# Patient Record
Sex: FEMALE | Race: WHITE | NOT HISPANIC OR LATINO | Employment: OTHER | URBAN - METROPOLITAN AREA
[De-identification: names, ages, dates, MRNs, and addresses within clinical notes are randomized per-mention and may not be internally consistent; named-entity substitution may affect disease eponyms.]

---

## 2017-02-03 ENCOUNTER — APPOINTMENT (EMERGENCY)
Dept: RADIOLOGY | Facility: HOSPITAL | Age: 67
End: 2017-02-03
Payer: MEDICARE

## 2017-02-03 ENCOUNTER — HOSPITAL ENCOUNTER (EMERGENCY)
Facility: HOSPITAL | Age: 67
Discharge: HOME | End: 2017-02-03
Attending: EMERGENCY MEDICINE | Admitting: EMERGENCY MEDICINE
Payer: MEDICARE

## 2017-02-03 VITALS
RESPIRATION RATE: 16 BRPM | HEART RATE: 70 BPM | DIASTOLIC BLOOD PRESSURE: 73 MMHG | OXYGEN SATURATION: 99 % | WEIGHT: 170 LBS | SYSTOLIC BLOOD PRESSURE: 160 MMHG

## 2017-02-03 DIAGNOSIS — E16.2 HYPOGLYCEMIA: Primary | ICD-10-CM

## 2017-02-03 LAB
ALBUMIN SERPL BCP-MCNC: 3.4 G/DL (ref 3.5–5)
ALP SERPL-CCNC: 35 U/L (ref 46–116)
ALT SERPL W P-5'-P-CCNC: 24 U/L (ref 12–78)
ANION GAP SERPL CALCULATED.3IONS-SCNC: 10 MMOL/L (ref 4–13)
AST SERPL W P-5'-P-CCNC: 56 U/L (ref 5–45)
ATRIAL RATE: 66 BPM
BASOPHILS # BLD AUTO: 0.01 THOUSANDS/ΜL (ref 0–0.1)
BASOPHILS NFR BLD AUTO: 0 % (ref 0–1)
BILIRUB SERPL-MCNC: 0.62 MG/DL (ref 0.2–1)
BUN SERPL-MCNC: 19 MG/DL (ref 5–25)
CALCIUM SERPL-MCNC: 8.7 MG/DL (ref 8.3–10.1)
CHLORIDE SERPL-SCNC: 101 MMOL/L (ref 100–108)
CO2 SERPL-SCNC: 23 MMOL/L (ref 21–32)
CREAT SERPL-MCNC: 0.92 MG/DL (ref 0.6–1.3)
EOSINOPHIL # BLD AUTO: 0.12 THOUSAND/ΜL (ref 0–0.61)
EOSINOPHIL NFR BLD AUTO: 1 % (ref 0–6)
ERYTHROCYTE [DISTWIDTH] IN BLOOD BY AUTOMATED COUNT: 13.3 % (ref 11.6–15.1)
GFR SERPL CREATININE-BSD FRML MDRD: >60 ML/MIN/1.73SQ M
GLUCOSE SERPL-MCNC: 203 MG/DL (ref 65–140)
GLUCOSE SERPL-MCNC: 246 MG/DL (ref 65–140)
GLUCOSE SERPL-MCNC: 254 MG/DL (ref 65–140)
GLUCOSE SERPL-MCNC: 384 MG/DL (ref 65–140)
HCT VFR BLD AUTO: 36.8 % (ref 34.8–46.1)
HGB BLD-MCNC: 12.4 G/DL (ref 11.5–15.4)
LYMPHOCYTES # BLD AUTO: 1.08 THOUSANDS/ΜL (ref 0.6–4.47)
LYMPHOCYTES NFR BLD AUTO: 12 % (ref 14–44)
MCH RBC QN AUTO: 29.7 PG (ref 26.8–34.3)
MCHC RBC AUTO-ENTMCNC: 33.7 G/DL (ref 31.4–37.4)
MCV RBC AUTO: 88 FL (ref 82–98)
MONOCYTES # BLD AUTO: 0.8 THOUSAND/ΜL (ref 0.17–1.22)
MONOCYTES NFR BLD AUTO: 9 % (ref 4–12)
NEUTROPHILS # BLD AUTO: 6.89 THOUSANDS/ΜL (ref 1.85–7.62)
NEUTS SEG NFR BLD AUTO: 78 % (ref 43–75)
NRBC BLD AUTO-RTO: 0 /100 WBCS
P AXIS: 44 DEGREES
PLATELET # BLD AUTO: 258 THOUSANDS/UL (ref 149–390)
PMV BLD AUTO: 11.4 FL (ref 8.9–12.7)
POTASSIUM SERPL-SCNC: 4.5 MMOL/L (ref 3.5–5.3)
PR INTERVAL: 140 MS
PROT SERPL-MCNC: 7 G/DL (ref 6.4–8.2)
QRS AXIS: 38 DEGREES
QRSD INTERVAL: 94 MS
QT INTERVAL: 398 MS
QTC INTERVAL: 417 MS
RBC # BLD AUTO: 4.17 MILLION/UL (ref 3.81–5.12)
SODIUM SERPL-SCNC: 134 MMOL/L (ref 136–145)
SPECIMEN SOURCE: NORMAL
T WAVE AXIS: 27 DEGREES
TROPONIN I BLD-MCNC: 0 NG/ML (ref 0–0.08)
VENTRICULAR RATE: 66 BPM
WBC # BLD AUTO: 8.91 THOUSAND/UL (ref 4.31–10.16)

## 2017-02-03 PROCEDURE — 70450 CT HEAD/BRAIN W/O DYE: CPT

## 2017-02-03 PROCEDURE — 80053 COMPREHEN METABOLIC PANEL: CPT | Performed by: EMERGENCY MEDICINE

## 2017-02-03 PROCEDURE — 99285 EMERGENCY DEPT VISIT HI MDM: CPT

## 2017-02-03 PROCEDURE — 84484 ASSAY OF TROPONIN QUANT: CPT

## 2017-02-03 PROCEDURE — 82948 REAGENT STRIP/BLOOD GLUCOSE: CPT

## 2017-02-03 PROCEDURE — 36415 COLL VENOUS BLD VENIPUNCTURE: CPT | Performed by: EMERGENCY MEDICINE

## 2017-02-03 PROCEDURE — 85025 COMPLETE CBC W/AUTO DIFF WBC: CPT | Performed by: EMERGENCY MEDICINE

## 2017-02-03 PROCEDURE — 93005 ELECTROCARDIOGRAM TRACING: CPT | Performed by: EMERGENCY MEDICINE

## 2017-02-03 RX ORDER — DEXTROSE MONOHYDRATE 25 G/50ML
INJECTION, SOLUTION INTRAVENOUS
Status: DISCONTINUED
Start: 2017-02-03 | End: 2017-02-03 | Stop reason: HOSPADM

## 2017-02-03 RX ORDER — DEXTROSE MONOHYDRATE 25 G/50ML
25 INJECTION, SOLUTION INTRAVENOUS ONCE
Status: DISCONTINUED | OUTPATIENT
Start: 2017-02-03 | End: 2017-02-03 | Stop reason: HOSPADM

## 2017-05-05 ENCOUNTER — TRANSCRIBE ORDERS (OUTPATIENT)
Dept: LAB | Facility: CLINIC | Age: 67
End: 2017-05-05

## 2017-05-05 ENCOUNTER — APPOINTMENT (OUTPATIENT)
Dept: LAB | Facility: CLINIC | Age: 67
End: 2017-05-05
Payer: MEDICARE

## 2017-05-05 DIAGNOSIS — E03.9 UNSPECIFIED HYPOTHYROIDISM: ICD-10-CM

## 2017-05-05 DIAGNOSIS — E27.5 MEDULLOADRENAL HYPERFUNCTION (HCC): ICD-10-CM

## 2017-05-05 DIAGNOSIS — I10 UNSPECIFIED ESSENTIAL HYPERTENSION: ICD-10-CM

## 2017-05-05 DIAGNOSIS — E11.8 DIABETIC COMPLICATION (HCC): Primary | ICD-10-CM

## 2017-05-05 DIAGNOSIS — E11.8 DIABETIC COMPLICATION (HCC): ICD-10-CM

## 2017-05-05 LAB
ALBUMIN SERPL BCP-MCNC: 3.6 G/DL (ref 3.5–5)
ALP SERPL-CCNC: 39 U/L (ref 46–116)
ALT SERPL W P-5'-P-CCNC: 23 U/L (ref 12–78)
ANION GAP SERPL CALCULATED.3IONS-SCNC: 14 MMOL/L (ref 4–13)
AST SERPL W P-5'-P-CCNC: 18 U/L (ref 5–45)
BASOPHILS # BLD AUTO: 0.02 THOUSANDS/ΜL (ref 0–0.1)
BASOPHILS NFR BLD AUTO: 0 % (ref 0–1)
BILIRUB SERPL-MCNC: 0.6 MG/DL (ref 0.2–1)
BUN SERPL-MCNC: 24 MG/DL (ref 5–25)
CALCIUM SERPL-MCNC: 9.2 MG/DL (ref 8.3–10.1)
CHLORIDE SERPL-SCNC: 104 MMOL/L (ref 100–108)
CO2 SERPL-SCNC: 25 MMOL/L (ref 21–32)
CREAT SERPL-MCNC: 0.95 MG/DL (ref 0.6–1.3)
CREAT UR-MCNC: 223 MG/DL
EOSINOPHIL # BLD AUTO: 0.06 THOUSAND/ΜL (ref 0–0.61)
EOSINOPHIL NFR BLD AUTO: 1 % (ref 0–6)
ERYTHROCYTE [DISTWIDTH] IN BLOOD BY AUTOMATED COUNT: 13.4 % (ref 11.6–15.1)
EST. AVERAGE GLUCOSE BLD GHB EST-MCNC: 137 MG/DL
GFR SERPL CREATININE-BSD FRML MDRD: 58.9 ML/MIN/1.73SQ M
GLUCOSE P FAST SERPL-MCNC: 83 MG/DL (ref 65–99)
HBA1C MFR BLD: 6.4 % (ref 4.2–6.3)
HCT VFR BLD AUTO: 38.8 % (ref 34.8–46.1)
HGB BLD-MCNC: 12.9 G/DL (ref 11.5–15.4)
LYMPHOCYTES # BLD AUTO: 1.31 THOUSANDS/ΜL (ref 0.6–4.47)
LYMPHOCYTES NFR BLD AUTO: 23 % (ref 14–44)
MAGNESIUM SERPL-MCNC: 1.3 MG/DL (ref 1.6–2.6)
MCH RBC QN AUTO: 29.4 PG (ref 26.8–34.3)
MCHC RBC AUTO-ENTMCNC: 33.2 G/DL (ref 31.4–37.4)
MCV RBC AUTO: 88 FL (ref 82–98)
MICROALBUMIN UR-MCNC: 19.5 MG/L (ref 0–20)
MICROALBUMIN/CREAT 24H UR: 9 MG/G CREATININE (ref 0–30)
MONOCYTES # BLD AUTO: 0.58 THOUSAND/ΜL (ref 0.17–1.22)
MONOCYTES NFR BLD AUTO: 10 % (ref 4–12)
NEUTROPHILS # BLD AUTO: 3.86 THOUSANDS/ΜL (ref 1.85–7.62)
NEUTS SEG NFR BLD AUTO: 66 % (ref 43–75)
PHOSPHATE SERPL-MCNC: 4 MG/DL (ref 2.3–4.1)
PLATELET # BLD AUTO: 297 THOUSANDS/UL (ref 149–390)
PMV BLD AUTO: 11.3 FL (ref 8.9–12.7)
POTASSIUM SERPL-SCNC: 4.2 MMOL/L (ref 3.5–5.3)
PROT SERPL-MCNC: 6.9 G/DL (ref 6.4–8.2)
RBC # BLD AUTO: 4.39 MILLION/UL (ref 3.81–5.12)
SODIUM SERPL-SCNC: 143 MMOL/L (ref 136–145)
T4 FREE SERPL-MCNC: 1.76 NG/DL (ref 0.76–1.46)
TSH SERPL DL<=0.05 MIU/L-ACNC: 0.42 UIU/ML (ref 0.36–3.74)
WBC # BLD AUTO: 5.83 THOUSAND/UL (ref 4.31–10.16)

## 2017-05-05 PROCEDURE — 82043 UR ALBUMIN QUANTITATIVE: CPT | Performed by: SPECIALIST

## 2017-05-05 PROCEDURE — 83036 HEMOGLOBIN GLYCOSYLATED A1C: CPT

## 2017-05-05 PROCEDURE — 36415 COLL VENOUS BLD VENIPUNCTURE: CPT

## 2017-05-05 PROCEDURE — 84443 ASSAY THYROID STIM HORMONE: CPT

## 2017-05-05 PROCEDURE — 84100 ASSAY OF PHOSPHORUS: CPT

## 2017-05-05 PROCEDURE — 82570 ASSAY OF URINE CREATININE: CPT | Performed by: SPECIALIST

## 2017-05-05 PROCEDURE — 84439 ASSAY OF FREE THYROXINE: CPT

## 2017-05-05 PROCEDURE — 80053 COMPREHEN METABOLIC PANEL: CPT

## 2017-05-05 PROCEDURE — 85025 COMPLETE CBC W/AUTO DIFF WBC: CPT

## 2017-05-05 PROCEDURE — 83735 ASSAY OF MAGNESIUM: CPT

## 2017-05-05 PROCEDURE — 83835 ASSAY OF METANEPHRINES: CPT

## 2017-05-10 LAB
METANEPH FREE SERPL-MCNC: <10 PG/ML (ref 0–62)
NORMETANEPHRINE SERPL-MCNC: 57 PG/ML (ref 0–145)

## 2017-07-23 ENCOUNTER — APPOINTMENT (EMERGENCY)
Dept: CT IMAGING | Facility: HOSPITAL | Age: 67
End: 2017-07-23
Payer: MEDICARE

## 2017-07-23 ENCOUNTER — APPOINTMENT (EMERGENCY)
Dept: RADIOLOGY | Facility: HOSPITAL | Age: 67
End: 2017-07-23
Payer: MEDICARE

## 2017-07-23 ENCOUNTER — HOSPITAL ENCOUNTER (OUTPATIENT)
Facility: HOSPITAL | Age: 67
Setting detail: OBSERVATION
Discharge: HOME | End: 2017-07-24
Attending: EMERGENCY MEDICINE | Admitting: INTERNAL MEDICINE
Payer: MEDICARE

## 2017-07-23 DIAGNOSIS — R41.82 ALTERED MENTAL STATUS: Primary | ICD-10-CM

## 2017-07-23 DIAGNOSIS — Z86.73 HISTORY OF STROKE: ICD-10-CM

## 2017-07-23 DIAGNOSIS — R19.7 DIARRHEA: ICD-10-CM

## 2017-07-23 DIAGNOSIS — E16.2 HYPOGLYCEMIA: ICD-10-CM

## 2017-07-23 DIAGNOSIS — G40.209 COMPLEX PARTIAL SEIZURE WITH IMPAIRMENT OF CONSCIOUSNESS AT ONSET (HCC): ICD-10-CM

## 2017-07-23 DIAGNOSIS — E86.0 DEHYDRATION: ICD-10-CM

## 2017-07-23 PROBLEM — E03.9 HYPOTHYROIDISM: Status: ACTIVE | Noted: 2017-07-23

## 2017-07-23 PROBLEM — IMO0001 INSULIN DEPENDENT DIABETES MELLITUS: Status: ACTIVE | Noted: 2017-07-23

## 2017-07-23 PROBLEM — I10 HYPERTENSION: Status: ACTIVE | Noted: 2017-07-23

## 2017-07-23 LAB
ALBUMIN SERPL BCP-MCNC: 3.4 G/DL (ref 3.5–5)
ALP SERPL-CCNC: 45 U/L (ref 46–116)
ALT SERPL W P-5'-P-CCNC: 21 U/L (ref 12–78)
AMMONIA PLAS-SCNC: <10 UMOL/L (ref 11–35)
AMPHETAMINES SERPL QL SCN: NEGATIVE
ANION GAP SERPL CALCULATED.3IONS-SCNC: 14 MMOL/L (ref 4–13)
APAP SERPL-MCNC: 3.2 UG/ML (ref 10–30)
APTT PPP: 35 SECONDS (ref 23–35)
AST SERPL W P-5'-P-CCNC: 15 U/L (ref 5–45)
BARBITURATES UR QL: NEGATIVE
BASOPHILS # BLD AUTO: 0.02 THOUSANDS/ΜL (ref 0–0.1)
BASOPHILS NFR BLD AUTO: 0 % (ref 0–1)
BENZODIAZ UR QL: NEGATIVE
BILIRUB SERPL-MCNC: 0.2 MG/DL (ref 0.2–1)
BILIRUB UR QL STRIP: NEGATIVE
BUN SERPL-MCNC: 29 MG/DL (ref 5–25)
CALCIUM SERPL-MCNC: 8.9 MG/DL (ref 8.3–10.1)
CHLORIDE SERPL-SCNC: 105 MMOL/L (ref 100–108)
CLARITY UR: CLEAR
CO2 SERPL-SCNC: 23 MMOL/L (ref 21–32)
COCAINE UR QL: NEGATIVE
COLOR UR: YELLOW
CREAT SERPL-MCNC: 0.91 MG/DL (ref 0.6–1.3)
EOSINOPHIL # BLD AUTO: 0.05 THOUSAND/ΜL (ref 0–0.61)
EOSINOPHIL NFR BLD AUTO: 1 % (ref 0–6)
ERYTHROCYTE [DISTWIDTH] IN BLOOD BY AUTOMATED COUNT: 13.2 % (ref 11.6–15.1)
ETHANOL SERPL-MCNC: <3 MG/DL (ref 0–3)
GFR SERPL CREATININE-BSD FRML MDRD: >60 ML/MIN/1.73SQ M
GLUCOSE SERPL-MCNC: 148 MG/DL (ref 65–140)
GLUCOSE SERPL-MCNC: 159 MG/DL (ref 65–140)
GLUCOSE SERPL-MCNC: 181 MG/DL (ref 65–140)
GLUCOSE SERPL-MCNC: 194 MG/DL (ref 65–140)
GLUCOSE SERPL-MCNC: 195 MG/DL (ref 65–140)
GLUCOSE SERPL-MCNC: 205 MG/DL (ref 65–140)
GLUCOSE SERPL-MCNC: 74 MG/DL (ref 65–140)
GLUCOSE UR STRIP-MCNC: NEGATIVE MG/DL
HCT VFR BLD AUTO: 34.3 % (ref 34.8–46.1)
HGB BLD-MCNC: 11.5 G/DL (ref 11.5–15.4)
HGB UR QL STRIP.AUTO: NEGATIVE
INR PPP: 0.86 (ref 0.86–1.16)
KETONES UR STRIP-MCNC: ABNORMAL MG/DL
LACTATE SERPL-SCNC: 1.7 MMOL/L (ref 0.5–2)
LEUKOCYTE ESTERASE UR QL STRIP: NEGATIVE
LYMPHOCYTES # BLD AUTO: 1.17 THOUSANDS/ΜL (ref 0.6–4.47)
LYMPHOCYTES NFR BLD AUTO: 16 % (ref 14–44)
MCH RBC QN AUTO: 30.1 PG (ref 26.8–34.3)
MCHC RBC AUTO-ENTMCNC: 33.5 G/DL (ref 31.4–37.4)
MCV RBC AUTO: 90 FL (ref 82–98)
METHADONE UR QL: NEGATIVE
MONOCYTES # BLD AUTO: 0.72 THOUSAND/ΜL (ref 0.17–1.22)
MONOCYTES NFR BLD AUTO: 10 % (ref 4–12)
NEUTROPHILS # BLD AUTO: 5.61 THOUSANDS/ΜL (ref 1.85–7.62)
NEUTS SEG NFR BLD AUTO: 73 % (ref 43–75)
NITRITE UR QL STRIP: NEGATIVE
OPIATES UR QL SCN: NEGATIVE
PCP UR QL: NEGATIVE
PH UR STRIP.AUTO: 5 [PH] (ref 4.5–8)
PLATELET # BLD AUTO: 305 THOUSANDS/UL (ref 149–390)
PMV BLD AUTO: 10.7 FL (ref 8.9–12.7)
POTASSIUM SERPL-SCNC: 3.6 MMOL/L (ref 3.5–5.3)
PROT SERPL-MCNC: 6.2 G/DL (ref 6.4–8.2)
PROT UR STRIP-MCNC: NEGATIVE MG/DL
PROTHROMBIN TIME: 12 SECONDS (ref 12.1–14.4)
RBC # BLD AUTO: 3.82 MILLION/UL (ref 3.81–5.12)
SALICYLATES SERPL-MCNC: <3 MG/DL (ref 3–20)
SODIUM SERPL-SCNC: 142 MMOL/L (ref 136–145)
SP GR UR STRIP.AUTO: >=1.03 (ref 1–1.03)
THC UR QL: NEGATIVE
TROPONIN I SERPL-MCNC: <0.02 NG/ML
TSH SERPL DL<=0.05 MIU/L-ACNC: 0.74 UIU/ML (ref 0.36–3.74)
UROBILINOGEN UR QL STRIP.AUTO: 0.2 E.U./DL
WBC # BLD AUTO: 7.57 THOUSAND/UL (ref 4.31–10.16)

## 2017-07-23 PROCEDURE — 80329 ANALGESICS NON-OPIOID 1 OR 2: CPT | Performed by: EMERGENCY MEDICINE

## 2017-07-23 PROCEDURE — 83605 ASSAY OF LACTIC ACID: CPT | Performed by: EMERGENCY MEDICINE

## 2017-07-23 PROCEDURE — 82948 REAGENT STRIP/BLOOD GLUCOSE: CPT

## 2017-07-23 PROCEDURE — 85730 THROMBOPLASTIN TIME PARTIAL: CPT | Performed by: EMERGENCY MEDICINE

## 2017-07-23 PROCEDURE — 96374 THER/PROPH/DIAG INJ IV PUSH: CPT

## 2017-07-23 PROCEDURE — 85025 COMPLETE CBC W/AUTO DIFF WBC: CPT | Performed by: EMERGENCY MEDICINE

## 2017-07-23 PROCEDURE — 96361 HYDRATE IV INFUSION ADD-ON: CPT

## 2017-07-23 PROCEDURE — 87086 URINE CULTURE/COLONY COUNT: CPT | Performed by: EMERGENCY MEDICINE

## 2017-07-23 PROCEDURE — 84443 ASSAY THYROID STIM HORMONE: CPT | Performed by: EMERGENCY MEDICINE

## 2017-07-23 PROCEDURE — 36415 COLL VENOUS BLD VENIPUNCTURE: CPT | Performed by: EMERGENCY MEDICINE

## 2017-07-23 PROCEDURE — 93005 ELECTROCARDIOGRAM TRACING: CPT | Performed by: EMERGENCY MEDICINE

## 2017-07-23 PROCEDURE — 80320 DRUG SCREEN QUANTALCOHOLS: CPT | Performed by: EMERGENCY MEDICINE

## 2017-07-23 PROCEDURE — 99285 EMERGENCY DEPT VISIT HI MDM: CPT

## 2017-07-23 PROCEDURE — 87040 BLOOD CULTURE FOR BACTERIA: CPT | Performed by: EMERGENCY MEDICINE

## 2017-07-23 PROCEDURE — 71010 HB CHEST X-RAY 1 VIEW FRONTAL (PORTABLE): CPT

## 2017-07-23 PROCEDURE — 85610 PROTHROMBIN TIME: CPT | Performed by: EMERGENCY MEDICINE

## 2017-07-23 PROCEDURE — 80053 COMPREHEN METABOLIC PANEL: CPT | Performed by: EMERGENCY MEDICINE

## 2017-07-23 PROCEDURE — 82140 ASSAY OF AMMONIA: CPT | Performed by: EMERGENCY MEDICINE

## 2017-07-23 PROCEDURE — 84484 ASSAY OF TROPONIN QUANT: CPT | Performed by: EMERGENCY MEDICINE

## 2017-07-23 PROCEDURE — 70450 CT HEAD/BRAIN W/O DYE: CPT

## 2017-07-23 PROCEDURE — 81003 URINALYSIS AUTO W/O SCOPE: CPT | Performed by: EMERGENCY MEDICINE

## 2017-07-23 PROCEDURE — 80307 DRUG TEST PRSMV CHEM ANLYZR: CPT | Performed by: EMERGENCY MEDICINE

## 2017-07-23 RX ORDER — FENOFIBRATE 145 MG/1
145 TABLET, COATED ORAL DAILY
COMMUNITY

## 2017-07-23 RX ORDER — LEVETIRACETAM 250 MG/1
250 TABLET ORAL 2 TIMES DAILY
COMMUNITY
End: 2017-07-24 | Stop reason: HOSPADM

## 2017-07-23 RX ORDER — ATORVASTATIN CALCIUM 20 MG/1
20 TABLET, FILM COATED ORAL DAILY
Status: DISCONTINUED | OUTPATIENT
Start: 2017-07-23 | End: 2017-07-24 | Stop reason: HOSPADM

## 2017-07-23 RX ORDER — FENOFIBRATE 145 MG/1
145 TABLET, COATED ORAL DAILY
Status: DISCONTINUED | OUTPATIENT
Start: 2017-07-23 | End: 2017-07-24 | Stop reason: HOSPADM

## 2017-07-23 RX ORDER — INSULIN GLARGINE 100 [IU]/ML
15 INJECTION, SOLUTION SUBCUTANEOUS
Status: DISCONTINUED | OUTPATIENT
Start: 2017-07-23 | End: 2017-07-24 | Stop reason: HOSPADM

## 2017-07-23 RX ORDER — CHLORAL HYDRATE 500 MG
1000 CAPSULE ORAL DAILY
Status: DISCONTINUED | OUTPATIENT
Start: 2017-07-23 | End: 2017-07-24 | Stop reason: HOSPADM

## 2017-07-23 RX ORDER — ACETAMINOPHEN 325 MG/1
500 TABLET ORAL EVERY 6 HOURS PRN
Status: DISCONTINUED | OUTPATIENT
Start: 2017-07-23 | End: 2017-07-24 | Stop reason: HOSPADM

## 2017-07-23 RX ORDER — DEXTROSE AND SODIUM CHLORIDE 5; .45 G/100ML; G/100ML
125 INJECTION, SOLUTION INTRAVENOUS CONTINUOUS
Status: DISCONTINUED | OUTPATIENT
Start: 2017-07-23 | End: 2017-07-23

## 2017-07-23 RX ORDER — ACETAMINOPHEN 500 MG
500 TABLET ORAL EVERY 6 HOURS PRN
COMMUNITY
End: 2017-07-30

## 2017-07-23 RX ORDER — CLOPIDOGREL BISULFATE 75 MG/1
75 TABLET ORAL DAILY
Status: DISCONTINUED | OUTPATIENT
Start: 2017-07-23 | End: 2017-07-24 | Stop reason: HOSPADM

## 2017-07-23 RX ORDER — INSULIN GLARGINE 100 [IU]/ML
INJECTION, SOLUTION SUBCUTANEOUS
Status: ON HOLD | COMMUNITY
End: 2017-07-23

## 2017-07-23 RX ORDER — LISINOPRIL 30 MG/1
30 TABLET ORAL DAILY
COMMUNITY

## 2017-07-23 RX ORDER — LEVOTHYROXINE SODIUM 0.07 MG/1
75 TABLET ORAL
Status: DISCONTINUED | OUTPATIENT
Start: 2017-07-23 | End: 2017-07-24 | Stop reason: HOSPADM

## 2017-07-23 RX ORDER — LEVETIRACETAM 500 MG/1
250 TABLET ORAL 2 TIMES DAILY
Status: DISCONTINUED | OUTPATIENT
Start: 2017-07-23 | End: 2017-07-23

## 2017-07-23 RX ORDER — AMLODIPINE BESYLATE 10 MG/1
10 TABLET ORAL DAILY
Status: DISCONTINUED | OUTPATIENT
Start: 2017-07-23 | End: 2017-07-24 | Stop reason: HOSPADM

## 2017-07-23 RX ORDER — FLUOXETINE 20 MG/1
20 TABLET, FILM COATED ORAL
COMMUNITY

## 2017-07-23 RX ORDER — HYDROCHLOROTHIAZIDE 25 MG/1
25 TABLET ORAL DAILY
Status: DISCONTINUED | OUTPATIENT
Start: 2017-07-23 | End: 2017-07-24 | Stop reason: HOSPADM

## 2017-07-23 RX ORDER — SODIUM CHLORIDE 9 MG/ML
75 INJECTION, SOLUTION INTRAVENOUS CONTINUOUS
Status: DISCONTINUED | OUTPATIENT
Start: 2017-07-23 | End: 2017-07-24 | Stop reason: HOSPADM

## 2017-07-23 RX ORDER — DEXTROSE MONOHYDRATE 25 G/50ML
25 INJECTION, SOLUTION INTRAVENOUS ONCE
Status: COMPLETED | OUTPATIENT
Start: 2017-07-23 | End: 2017-07-23

## 2017-07-23 RX ORDER — LEVOTHYROXINE SODIUM 0.07 MG/1
75 TABLET ORAL
COMMUNITY

## 2017-07-23 RX ORDER — ATORVASTATIN CALCIUM 20 MG/1
40 TABLET, FILM COATED ORAL DAILY
COMMUNITY

## 2017-07-23 RX ORDER — HYDROCHLOROTHIAZIDE 25 MG/1
25 TABLET ORAL DAILY
COMMUNITY

## 2017-07-23 RX ORDER — AMLODIPINE BESYLATE 10 MG/1
10 TABLET ORAL DAILY
COMMUNITY

## 2017-07-23 RX ORDER — FLUOXETINE HYDROCHLORIDE 20 MG/1
20 CAPSULE ORAL DAILY
Status: DISCONTINUED | OUTPATIENT
Start: 2017-07-23 | End: 2017-07-24 | Stop reason: HOSPADM

## 2017-07-23 RX ORDER — LEVETIRACETAM 500 MG/1
500 TABLET ORAL 2 TIMES DAILY
Status: DISCONTINUED | OUTPATIENT
Start: 2017-07-23 | End: 2017-07-24 | Stop reason: HOSPADM

## 2017-07-23 RX ORDER — OMEGA-3 FATTY ACIDS CAP DELAYED RELEASE 1000 MG 1000 MG
1000 CAPSULE DELAYED RELEASE ORAL 2 TIMES DAILY
COMMUNITY
End: 2019-10-09 | Stop reason: HOSPADM

## 2017-07-23 RX ORDER — ONDANSETRON 2 MG/ML
4 INJECTION INTRAMUSCULAR; INTRAVENOUS EVERY 6 HOURS PRN
Status: DISCONTINUED | OUTPATIENT
Start: 2017-07-23 | End: 2017-07-24 | Stop reason: HOSPADM

## 2017-07-23 RX ORDER — CLOPIDOGREL BISULFATE 75 MG/1
75 TABLET ORAL DAILY
COMMUNITY
End: 2018-04-30 | Stop reason: HOSPADM

## 2017-07-23 RX ADMIN — SODIUM CHLORIDE 75 ML/HR: 0.9 INJECTION, SOLUTION INTRAVENOUS at 10:58

## 2017-07-23 RX ADMIN — Medication 1000 MG: at 10:52

## 2017-07-23 RX ADMIN — SODIUM CHLORIDE 500 ML: 0.9 INJECTION, SOLUTION INTRAVENOUS at 05:56

## 2017-07-23 RX ADMIN — LEVETIRACETAM 250 MG: 500 TABLET ORAL at 10:52

## 2017-07-23 RX ADMIN — ENOXAPARIN SODIUM 40 MG: 40 INJECTION SUBCUTANEOUS at 10:53

## 2017-07-23 RX ADMIN — AMLODIPINE BESYLATE 10 MG: 10 TABLET ORAL at 10:52

## 2017-07-23 RX ADMIN — INSULIN LISPRO 1 UNITS: 100 INJECTION, SOLUTION INTRAVENOUS; SUBCUTANEOUS at 17:36

## 2017-07-23 RX ADMIN — LEVOTHYROXINE SODIUM 75 MCG: 75 TABLET ORAL at 10:51

## 2017-07-23 RX ADMIN — FLUOXETINE 20 MG: 20 CAPSULE ORAL at 10:52

## 2017-07-23 RX ADMIN — DEXTROSE MONOHYDRATE 25 ML: 25 INJECTION, SOLUTION INTRAVENOUS at 04:28

## 2017-07-23 RX ADMIN — DEXTROSE AND SODIUM CHLORIDE 125 ML/HR: 5; .45 INJECTION, SOLUTION INTRAVENOUS at 04:36

## 2017-07-23 RX ADMIN — HYDROCHLOROTHIAZIDE 25 MG: 25 TABLET ORAL at 10:52

## 2017-07-23 RX ADMIN — INSULIN GLARGINE 15 UNITS: 100 INJECTION, SOLUTION SUBCUTANEOUS at 21:39

## 2017-07-23 RX ADMIN — LISINOPRIL 30 MG: 10 TABLET ORAL at 10:52

## 2017-07-23 RX ADMIN — FENOFIBRATE 145 MG: 145 TABLET, FILM COATED ORAL at 10:53

## 2017-07-23 RX ADMIN — CLOPIDOGREL BISULFATE 75 MG: 75 TABLET ORAL at 10:53

## 2017-07-23 RX ADMIN — LEVETIRACETAM 500 MG: 500 TABLET ORAL at 17:35

## 2017-07-23 RX ADMIN — ATORVASTATIN CALCIUM 20 MG: 20 TABLET, FILM COATED ORAL at 10:53

## 2017-07-24 VITALS
DIASTOLIC BLOOD PRESSURE: 64 MMHG | SYSTOLIC BLOOD PRESSURE: 136 MMHG | HEIGHT: 59 IN | OXYGEN SATURATION: 95 % | TEMPERATURE: 98.6 F | WEIGHT: 152.6 LBS | RESPIRATION RATE: 18 BRPM | HEART RATE: 51 BPM | BODY MASS INDEX: 30.76 KG/M2

## 2017-07-24 PROBLEM — G40.209 COMPLEX PARTIAL SEIZURE WITH IMPAIRMENT OF CONSCIOUSNESS AT ONSET (HCC): Status: RESOLVED | Noted: 2017-07-01 | Resolved: 2017-07-24

## 2017-07-24 PROBLEM — R19.7 DIARRHEA: Status: RESOLVED | Noted: 2017-07-23 | Resolved: 2017-07-24

## 2017-07-24 PROBLEM — G40.209 COMPLEX PARTIAL SEIZURE WITH IMPAIRMENT OF CONSCIOUSNESS AT ONSET (HCC): Status: ACTIVE | Noted: 2017-07-01

## 2017-07-24 PROBLEM — R41.82 MENTAL STATUS CHANGE: Status: RESOLVED | Noted: 2017-07-23 | Resolved: 2017-07-24

## 2017-07-24 LAB
BACTERIA UR CULT: NORMAL
EST. AVERAGE GLUCOSE BLD GHB EST-MCNC: 143 MG/DL
GLUCOSE SERPL-MCNC: 123 MG/DL (ref 65–140)
HBA1C MFR BLD: 6.6 % (ref 4.2–6.3)
TSH SERPL DL<=0.05 MIU/L-ACNC: 0.64 UIU/ML (ref 0.36–3.74)

## 2017-07-24 PROCEDURE — 84443 ASSAY THYROID STIM HORMONE: CPT | Performed by: INTERNAL MEDICINE

## 2017-07-24 PROCEDURE — 82948 REAGENT STRIP/BLOOD GLUCOSE: CPT

## 2017-07-24 PROCEDURE — G8978 MOBILITY CURRENT STATUS: HCPCS

## 2017-07-24 PROCEDURE — 97163 PT EVAL HIGH COMPLEX 45 MIN: CPT

## 2017-07-24 PROCEDURE — G8988 SELF CARE GOAL STATUS: HCPCS

## 2017-07-24 PROCEDURE — G8987 SELF CARE CURRENT STATUS: HCPCS

## 2017-07-24 PROCEDURE — 83036 HEMOGLOBIN GLYCOSYLATED A1C: CPT | Performed by: INTERNAL MEDICINE

## 2017-07-24 PROCEDURE — 97166 OT EVAL MOD COMPLEX 45 MIN: CPT

## 2017-07-24 PROCEDURE — G8979 MOBILITY GOAL STATUS: HCPCS

## 2017-07-24 PROCEDURE — 97116 GAIT TRAINING THERAPY: CPT

## 2017-07-24 RX ORDER — INSULIN GLARGINE 100 [IU]/ML
15 INJECTION, SOLUTION SUBCUTANEOUS
Qty: 10 ML | Refills: 0 | Status: ON HOLD | OUTPATIENT
Start: 2017-07-24 | End: 2017-07-31

## 2017-07-24 RX ORDER — LEVETIRACETAM 500 MG/1
500 TABLET ORAL 2 TIMES DAILY
Refills: 0
Start: 2017-07-24 | End: 2018-04-03

## 2017-07-24 RX ADMIN — ATORVASTATIN CALCIUM 20 MG: 20 TABLET, FILM COATED ORAL at 08:41

## 2017-07-24 RX ADMIN — ENOXAPARIN SODIUM 40 MG: 40 INJECTION SUBCUTANEOUS at 08:41

## 2017-07-24 RX ADMIN — LEVOTHYROXINE SODIUM 75 MCG: 75 TABLET ORAL at 06:37

## 2017-07-24 RX ADMIN — CLOPIDOGREL BISULFATE 75 MG: 75 TABLET ORAL at 08:41

## 2017-07-24 RX ADMIN — FENOFIBRATE 145 MG: 145 TABLET, FILM COATED ORAL at 08:41

## 2017-07-24 RX ADMIN — AMLODIPINE BESYLATE 10 MG: 10 TABLET ORAL at 08:41

## 2017-07-24 RX ADMIN — Medication 1000 MG: at 08:42

## 2017-07-24 RX ADMIN — LEVETIRACETAM 500 MG: 500 TABLET ORAL at 08:41

## 2017-07-24 RX ADMIN — HYDROCHLOROTHIAZIDE 25 MG: 25 TABLET ORAL at 08:41

## 2017-07-24 RX ADMIN — LISINOPRIL 30 MG: 10 TABLET ORAL at 08:41

## 2017-07-24 RX ADMIN — FLUOXETINE 20 MG: 20 CAPSULE ORAL at 08:42

## 2017-07-24 RX ADMIN — SODIUM CHLORIDE 75 ML/HR: 0.9 INJECTION, SOLUTION INTRAVENOUS at 01:01

## 2017-07-26 LAB
ATRIAL RATE: 75 BPM
P AXIS: 37 DEGREES
PR INTERVAL: 132 MS
QRS AXIS: 2 DEGREES
QRSD INTERVAL: 88 MS
QT INTERVAL: 388 MS
QTC INTERVAL: 433 MS
T WAVE AXIS: 27 DEGREES
VENTRICULAR RATE: 75 BPM

## 2017-07-28 LAB
BACTERIA BLD CULT: NORMAL
BACTERIA BLD CULT: NORMAL

## 2017-07-30 ENCOUNTER — HOSPITAL ENCOUNTER (OUTPATIENT)
Facility: HOSPITAL | Age: 67
Setting detail: OBSERVATION
Discharge: HOME HEALTH | End: 2017-07-31
Attending: EMERGENCY MEDICINE | Admitting: INTERNAL MEDICINE
Payer: MEDICARE

## 2017-07-30 DIAGNOSIS — IMO0001 INSULIN DEPENDENT DIABETES MELLITUS: ICD-10-CM

## 2017-07-30 DIAGNOSIS — E16.2 HYPOGLYCEMIA: Primary | ICD-10-CM

## 2017-07-30 PROBLEM — R56.9 SEIZURE (HCC): Status: ACTIVE | Noted: 2017-07-30

## 2017-07-30 LAB
ANION GAP SERPL CALCULATED.3IONS-SCNC: 9 MMOL/L (ref 4–13)
BASOPHILS # BLD AUTO: 0.01 THOUSANDS/ΜL (ref 0–0.1)
BASOPHILS NFR BLD AUTO: 0 % (ref 0–1)
BUN SERPL-MCNC: 25 MG/DL (ref 5–25)
CALCIUM SERPL-MCNC: 8.5 MG/DL (ref 8.3–10.1)
CHLORIDE SERPL-SCNC: 105 MMOL/L (ref 100–108)
CO2 SERPL-SCNC: 25 MMOL/L (ref 21–32)
CREAT SERPL-MCNC: 0.81 MG/DL (ref 0.6–1.3)
EOSINOPHIL # BLD AUTO: 0.09 THOUSAND/ΜL (ref 0–0.61)
EOSINOPHIL NFR BLD AUTO: 1 % (ref 0–6)
ERYTHROCYTE [DISTWIDTH] IN BLOOD BY AUTOMATED COUNT: 12.9 % (ref 11.6–15.1)
GFR SERPL CREATININE-BSD FRML MDRD: 76 ML/MIN/1.73SQ M
GLUCOSE SERPL-MCNC: 167 MG/DL (ref 65–140)
GLUCOSE SERPL-MCNC: 170 MG/DL (ref 65–140)
GLUCOSE SERPL-MCNC: 204 MG/DL (ref 65–140)
GLUCOSE SERPL-MCNC: 323 MG/DL (ref 65–140)
GLUCOSE SERPL-MCNC: 97 MG/DL (ref 65–140)
HCT VFR BLD AUTO: 35 % (ref 34.8–46.1)
HGB BLD-MCNC: 11.6 G/DL (ref 11.5–15.4)
LYMPHOCYTES # BLD AUTO: 1.28 THOUSANDS/ΜL (ref 0.6–4.47)
LYMPHOCYTES NFR BLD AUTO: 18 % (ref 14–44)
MCH RBC QN AUTO: 29.9 PG (ref 26.8–34.3)
MCHC RBC AUTO-ENTMCNC: 33.1 G/DL (ref 31.4–37.4)
MCV RBC AUTO: 90 FL (ref 82–98)
MONOCYTES # BLD AUTO: 0.77 THOUSAND/ΜL (ref 0.17–1.22)
MONOCYTES NFR BLD AUTO: 11 % (ref 4–12)
NEUTROPHILS # BLD AUTO: 4.87 THOUSANDS/ΜL (ref 1.85–7.62)
NEUTS SEG NFR BLD AUTO: 70 % (ref 43–75)
PLATELET # BLD AUTO: 244 THOUSANDS/UL (ref 149–390)
PMV BLD AUTO: 11.1 FL (ref 8.9–12.7)
POTASSIUM SERPL-SCNC: 3.9 MMOL/L (ref 3.5–5.3)
RBC # BLD AUTO: 3.88 MILLION/UL (ref 3.81–5.12)
SODIUM SERPL-SCNC: 139 MMOL/L (ref 136–145)
WBC # BLD AUTO: 7.02 THOUSAND/UL (ref 4.31–10.16)

## 2017-07-30 PROCEDURE — 99285 EMERGENCY DEPT VISIT HI MDM: CPT

## 2017-07-30 PROCEDURE — 82948 REAGENT STRIP/BLOOD GLUCOSE: CPT

## 2017-07-30 PROCEDURE — 36415 COLL VENOUS BLD VENIPUNCTURE: CPT | Performed by: EMERGENCY MEDICINE

## 2017-07-30 PROCEDURE — 93005 ELECTROCARDIOGRAM TRACING: CPT | Performed by: EMERGENCY MEDICINE

## 2017-07-30 PROCEDURE — 80048 BASIC METABOLIC PNL TOTAL CA: CPT | Performed by: EMERGENCY MEDICINE

## 2017-07-30 PROCEDURE — 85025 COMPLETE CBC W/AUTO DIFF WBC: CPT | Performed by: EMERGENCY MEDICINE

## 2017-07-30 RX ORDER — CLOPIDOGREL BISULFATE 75 MG/1
75 TABLET ORAL DAILY
Status: DISCONTINUED | OUTPATIENT
Start: 2017-07-30 | End: 2017-07-31 | Stop reason: HOSPADM

## 2017-07-30 RX ORDER — FENOFIBRATE 145 MG/1
145 TABLET, COATED ORAL DAILY
Status: DISCONTINUED | OUTPATIENT
Start: 2017-07-30 | End: 2017-07-31 | Stop reason: HOSPADM

## 2017-07-30 RX ORDER — LEVETIRACETAM 500 MG/1
500 TABLET ORAL 2 TIMES DAILY
Status: DISCONTINUED | OUTPATIENT
Start: 2017-07-30 | End: 2017-07-31 | Stop reason: HOSPADM

## 2017-07-30 RX ORDER — AMLODIPINE BESYLATE 10 MG/1
10 TABLET ORAL DAILY
Status: DISCONTINUED | OUTPATIENT
Start: 2017-07-30 | End: 2017-07-31 | Stop reason: HOSPADM

## 2017-07-30 RX ORDER — FLUOXETINE HYDROCHLORIDE 20 MG/1
20 CAPSULE ORAL DAILY
Status: DISCONTINUED | OUTPATIENT
Start: 2017-07-30 | End: 2017-07-31 | Stop reason: HOSPADM

## 2017-07-30 RX ORDER — ASPIRIN 81 MG/1
81 TABLET, CHEWABLE ORAL DAILY
COMMUNITY
End: 2018-04-30 | Stop reason: HOSPADM

## 2017-07-30 RX ORDER — CHLORAL HYDRATE 500 MG
1000 CAPSULE ORAL 2 TIMES DAILY
Status: DISCONTINUED | OUTPATIENT
Start: 2017-07-30 | End: 2017-07-31 | Stop reason: HOSPADM

## 2017-07-30 RX ORDER — ASPIRIN 81 MG/1
81 TABLET, CHEWABLE ORAL DAILY
Status: DISCONTINUED | OUTPATIENT
Start: 2017-07-30 | End: 2017-07-31 | Stop reason: HOSPADM

## 2017-07-30 RX ORDER — HYDROCHLOROTHIAZIDE 25 MG/1
25 TABLET ORAL DAILY
Status: DISCONTINUED | OUTPATIENT
Start: 2017-07-30 | End: 2017-07-31 | Stop reason: HOSPADM

## 2017-07-30 RX ORDER — INSULIN GLARGINE 100 [IU]/ML
15 INJECTION, SOLUTION SUBCUTANEOUS
Status: DISCONTINUED | OUTPATIENT
Start: 2017-07-30 | End: 2017-07-31

## 2017-07-30 RX ORDER — ATORVASTATIN CALCIUM 20 MG/1
20 TABLET, FILM COATED ORAL DAILY
Status: DISCONTINUED | OUTPATIENT
Start: 2017-07-30 | End: 2017-07-31 | Stop reason: HOSPADM

## 2017-07-30 RX ORDER — LEVOTHYROXINE SODIUM 0.07 MG/1
75 TABLET ORAL DAILY
Status: DISCONTINUED | OUTPATIENT
Start: 2017-07-30 | End: 2017-07-31 | Stop reason: HOSPADM

## 2017-07-30 RX ADMIN — Medication 1000 MG: at 11:50

## 2017-07-30 RX ADMIN — INSULIN LISPRO 3 UNITS: 100 INJECTION, SOLUTION INTRAVENOUS; SUBCUTANEOUS at 17:32

## 2017-07-30 RX ADMIN — INSULIN LISPRO 3 UNITS: 100 INJECTION, SOLUTION INTRAVENOUS; SUBCUTANEOUS at 21:42

## 2017-07-30 RX ADMIN — LISINOPRIL 30 MG: 10 TABLET ORAL at 11:50

## 2017-07-30 RX ADMIN — ASPIRIN 81 MG 81 MG: 81 TABLET ORAL at 11:49

## 2017-07-30 RX ADMIN — FLUOXETINE 20 MG: 20 CAPSULE ORAL at 11:50

## 2017-07-30 RX ADMIN — FENOFIBRATE 145 MG: 145 TABLET, FILM COATED ORAL at 11:49

## 2017-07-30 RX ADMIN — Medication 1000 MG: at 17:31

## 2017-07-30 RX ADMIN — LEVETIRACETAM 500 MG: 500 TABLET, FILM COATED ORAL at 17:31

## 2017-07-30 RX ADMIN — INSULIN GLARGINE 15 UNITS: 100 INJECTION, SOLUTION SUBCUTANEOUS at 21:42

## 2017-07-30 RX ADMIN — ATORVASTATIN CALCIUM 20 MG: 20 TABLET, FILM COATED ORAL at 11:50

## 2017-07-30 RX ADMIN — ENOXAPARIN SODIUM 40 MG: 40 INJECTION SUBCUTANEOUS at 11:50

## 2017-07-30 RX ADMIN — LEVOTHYROXINE SODIUM 75 MCG: 75 TABLET ORAL at 11:49

## 2017-07-30 RX ADMIN — LEVETIRACETAM 500 MG: 500 TABLET, FILM COATED ORAL at 11:50

## 2017-07-30 RX ADMIN — AMLODIPINE BESYLATE 10 MG: 10 TABLET ORAL at 11:50

## 2017-07-30 RX ADMIN — HYDROCHLOROTHIAZIDE 25 MG: 25 TABLET ORAL at 11:49

## 2017-07-30 RX ADMIN — INSULIN LISPRO 1 UNITS: 100 INJECTION, SOLUTION INTRAVENOUS; SUBCUTANEOUS at 17:33

## 2017-07-30 RX ADMIN — CLOPIDOGREL BISULFATE 75 MG: 75 TABLET ORAL at 11:49

## 2017-07-31 VITALS
SYSTOLIC BLOOD PRESSURE: 105 MMHG | OXYGEN SATURATION: 96 % | WEIGHT: 162.92 LBS | BODY MASS INDEX: 32.84 KG/M2 | HEART RATE: 67 BPM | TEMPERATURE: 98.5 F | HEIGHT: 59 IN | RESPIRATION RATE: 16 BRPM | DIASTOLIC BLOOD PRESSURE: 53 MMHG

## 2017-07-31 PROBLEM — E16.2 HYPOGLYCEMIA: Status: RESOLVED | Noted: 2017-07-30 | Resolved: 2017-07-31

## 2017-07-31 PROBLEM — R41.82 ALTERED MENTAL STATE: Status: RESOLVED | Noted: 2017-07-23 | Resolved: 2017-07-31

## 2017-07-31 LAB
ANION GAP SERPL CALCULATED.3IONS-SCNC: 9 MMOL/L (ref 4–13)
ATRIAL RATE: 55 BPM
BUN SERPL-MCNC: 19 MG/DL (ref 5–25)
CALCIUM SERPL-MCNC: 8.8 MG/DL (ref 8.3–10.1)
CHLORIDE SERPL-SCNC: 106 MMOL/L (ref 100–108)
CO2 SERPL-SCNC: 27 MMOL/L (ref 21–32)
CREAT SERPL-MCNC: 0.86 MG/DL (ref 0.6–1.3)
ERYTHROCYTE [DISTWIDTH] IN BLOOD BY AUTOMATED COUNT: 13 % (ref 11.6–15.1)
GFR SERPL CREATININE-BSD FRML MDRD: 71 ML/MIN/1.73SQ M
GLUCOSE P FAST SERPL-MCNC: 85 MG/DL (ref 65–99)
GLUCOSE SERPL-MCNC: 194 MG/DL (ref 65–140)
GLUCOSE SERPL-MCNC: 207 MG/DL (ref 65–140)
GLUCOSE SERPL-MCNC: 85 MG/DL (ref 65–140)
GLUCOSE SERPL-MCNC: 86 MG/DL (ref 65–140)
HCT VFR BLD AUTO: 34 % (ref 34.8–46.1)
HGB BLD-MCNC: 11.3 G/DL (ref 11.5–15.4)
MCH RBC QN AUTO: 29.9 PG (ref 26.8–34.3)
MCHC RBC AUTO-ENTMCNC: 33.2 G/DL (ref 31.4–37.4)
MCV RBC AUTO: 90 FL (ref 82–98)
P AXIS: 43 DEGREES
PLATELET # BLD AUTO: 273 THOUSANDS/UL (ref 149–390)
PMV BLD AUTO: 11 FL (ref 8.9–12.7)
POTASSIUM SERPL-SCNC: 3.5 MMOL/L (ref 3.5–5.3)
PR INTERVAL: 160 MS
QRS AXIS: 20 DEGREES
QRSD INTERVAL: 96 MS
QT INTERVAL: 462 MS
QTC INTERVAL: 441 MS
RBC # BLD AUTO: 3.78 MILLION/UL (ref 3.81–5.12)
SODIUM SERPL-SCNC: 142 MMOL/L (ref 136–145)
T WAVE AXIS: 36 DEGREES
VENTRICULAR RATE: 55 BPM
WBC # BLD AUTO: 6.31 THOUSAND/UL (ref 4.31–10.16)

## 2017-07-31 PROCEDURE — 82948 REAGENT STRIP/BLOOD GLUCOSE: CPT

## 2017-07-31 PROCEDURE — G8979 MOBILITY GOAL STATUS: HCPCS

## 2017-07-31 PROCEDURE — 97116 GAIT TRAINING THERAPY: CPT

## 2017-07-31 PROCEDURE — G8978 MOBILITY CURRENT STATUS: HCPCS

## 2017-07-31 PROCEDURE — 85027 COMPLETE CBC AUTOMATED: CPT | Performed by: PHYSICIAN ASSISTANT

## 2017-07-31 PROCEDURE — 97162 PT EVAL MOD COMPLEX 30 MIN: CPT

## 2017-07-31 PROCEDURE — 80048 BASIC METABOLIC PNL TOTAL CA: CPT | Performed by: PHYSICIAN ASSISTANT

## 2017-07-31 RX ORDER — INSULIN GLARGINE 100 [IU]/ML
7 INJECTION, SOLUTION SUBCUTANEOUS
Start: 2017-07-31 | End: 2020-09-23 | Stop reason: HOSPADM

## 2017-07-31 RX ORDER — INSULIN GLARGINE 100 [IU]/ML
7 INJECTION, SOLUTION SUBCUTANEOUS
Status: DISCONTINUED | OUTPATIENT
Start: 2017-07-31 | End: 2017-07-31 | Stop reason: HOSPADM

## 2017-07-31 RX ADMIN — FENOFIBRATE 145 MG: 145 TABLET, FILM COATED ORAL at 09:04

## 2017-07-31 RX ADMIN — ENOXAPARIN SODIUM 40 MG: 40 INJECTION SUBCUTANEOUS at 09:05

## 2017-07-31 RX ADMIN — HYDROCHLOROTHIAZIDE 25 MG: 25 TABLET ORAL at 09:04

## 2017-07-31 RX ADMIN — INSULIN LISPRO 3 UNITS: 100 INJECTION, SOLUTION INTRAVENOUS; SUBCUTANEOUS at 12:26

## 2017-07-31 RX ADMIN — Medication 1000 MG: at 09:05

## 2017-07-31 RX ADMIN — ASPIRIN 81 MG 81 MG: 81 TABLET ORAL at 09:04

## 2017-07-31 RX ADMIN — LEVETIRACETAM 500 MG: 500 TABLET, FILM COATED ORAL at 09:04

## 2017-07-31 RX ADMIN — CLOPIDOGREL BISULFATE 75 MG: 75 TABLET ORAL at 09:05

## 2017-07-31 RX ADMIN — ATORVASTATIN CALCIUM 20 MG: 20 TABLET, FILM COATED ORAL at 09:04

## 2017-07-31 RX ADMIN — AMLODIPINE BESYLATE 10 MG: 10 TABLET ORAL at 09:04

## 2017-07-31 RX ADMIN — LEVOTHYROXINE SODIUM 75 MCG: 75 TABLET ORAL at 08:08

## 2017-07-31 RX ADMIN — INSULIN LISPRO 1 UNITS: 100 INJECTION, SOLUTION INTRAVENOUS; SUBCUTANEOUS at 12:27

## 2017-07-31 RX ADMIN — FLUOXETINE 20 MG: 20 CAPSULE ORAL at 09:04

## 2017-07-31 RX ADMIN — LISINOPRIL 30 MG: 10 TABLET ORAL at 09:04

## 2018-01-15 ENCOUNTER — APPOINTMENT (OUTPATIENT)
Dept: LAB | Facility: CLINIC | Age: 68
End: 2018-01-15
Payer: MEDICARE

## 2018-01-15 ENCOUNTER — TRANSCRIBE ORDERS (OUTPATIENT)
Dept: LAB | Facility: CLINIC | Age: 68
End: 2018-01-15

## 2018-01-15 DIAGNOSIS — I51.9 MYXEDEMA HEART DISEASE: ICD-10-CM

## 2018-01-15 DIAGNOSIS — E03.9 MYXEDEMA HEART DISEASE: ICD-10-CM

## 2018-01-15 DIAGNOSIS — I10 ESSENTIAL HYPERTENSION, MALIGNANT: ICD-10-CM

## 2018-01-15 DIAGNOSIS — E27.5 MEDULLOADRENAL HYPERFUNCTION (HCC): ICD-10-CM

## 2018-01-15 DIAGNOSIS — E11.8 DIABETIC COMPLICATION (HCC): Primary | ICD-10-CM

## 2018-01-15 DIAGNOSIS — E11.8 DIABETIC COMPLICATION (HCC): ICD-10-CM

## 2018-01-15 LAB
ALBUMIN SERPL BCP-MCNC: 3.3 G/DL (ref 3.5–5)
ALP SERPL-CCNC: 47 U/L (ref 46–116)
ALT SERPL W P-5'-P-CCNC: 19 U/L (ref 12–78)
ANION GAP SERPL CALCULATED.3IONS-SCNC: 10 MMOL/L (ref 4–13)
AST SERPL W P-5'-P-CCNC: 13 U/L (ref 5–45)
BILIRUB SERPL-MCNC: 0.5 MG/DL (ref 0.2–1)
BUN SERPL-MCNC: 20 MG/DL (ref 5–25)
CALCIUM SERPL-MCNC: 8.6 MG/DL (ref 8.3–10.1)
CHLORIDE SERPL-SCNC: 103 MMOL/L (ref 100–108)
CO2 SERPL-SCNC: 26 MMOL/L (ref 21–32)
CREAT SERPL-MCNC: 0.97 MG/DL (ref 0.6–1.3)
CREAT UR-MCNC: 138 MG/DL
ERYTHROCYTE [DISTWIDTH] IN BLOOD BY AUTOMATED COUNT: 12.4 % (ref 11.6–15.1)
EST. AVERAGE GLUCOSE BLD GHB EST-MCNC: 117 MG/DL
GFR SERPL CREATININE-BSD FRML MDRD: 61 ML/MIN/1.73SQ M
GLUCOSE SERPL-MCNC: 113 MG/DL (ref 65–140)
HBA1C MFR BLD: 5.7 % (ref 4.2–6.3)
HCT VFR BLD AUTO: 37.8 % (ref 34.8–46.1)
HGB BLD-MCNC: 12 G/DL (ref 11.5–15.4)
MAGNESIUM SERPL-MCNC: 1.4 MG/DL (ref 1.6–2.6)
MCH RBC QN AUTO: 29.3 PG (ref 26.8–34.3)
MCHC RBC AUTO-ENTMCNC: 31.7 G/DL (ref 31.4–37.4)
MCV RBC AUTO: 92 FL (ref 82–98)
MICROALBUMIN UR-MCNC: 23.7 MG/L (ref 0–20)
MICROALBUMIN/CREAT 24H UR: 17 MG/G CREATININE (ref 0–30)
PHOSPHATE SERPL-MCNC: 4.3 MG/DL (ref 2.3–4.1)
PLATELET # BLD AUTO: 376 THOUSANDS/UL (ref 149–390)
PMV BLD AUTO: 10.5 FL (ref 8.9–12.7)
POTASSIUM SERPL-SCNC: 3.7 MMOL/L (ref 3.5–5.3)
PROT SERPL-MCNC: 6.5 G/DL (ref 6.4–8.2)
RBC # BLD AUTO: 4.1 MILLION/UL (ref 3.81–5.12)
SODIUM SERPL-SCNC: 139 MMOL/L (ref 136–145)
T4 FREE SERPL-MCNC: 1.71 NG/DL (ref 0.76–1.46)
TSH SERPL DL<=0.05 MIU/L-ACNC: 0.52 UIU/ML (ref 0.36–3.74)
WBC # BLD AUTO: 5.63 THOUSAND/UL (ref 4.31–10.16)

## 2018-01-15 PROCEDURE — 84100 ASSAY OF PHOSPHORUS: CPT

## 2018-01-15 PROCEDURE — 83835 ASSAY OF METANEPHRINES: CPT

## 2018-01-15 PROCEDURE — 82024 ASSAY OF ACTH: CPT

## 2018-01-15 PROCEDURE — 84443 ASSAY THYROID STIM HORMONE: CPT

## 2018-01-15 PROCEDURE — 84439 ASSAY OF FREE THYROXINE: CPT

## 2018-01-15 PROCEDURE — 82570 ASSAY OF URINE CREATININE: CPT | Performed by: SPECIALIST

## 2018-01-15 PROCEDURE — 82043 UR ALBUMIN QUANTITATIVE: CPT | Performed by: SPECIALIST

## 2018-01-15 PROCEDURE — 83735 ASSAY OF MAGNESIUM: CPT

## 2018-01-15 PROCEDURE — 83036 HEMOGLOBIN GLYCOSYLATED A1C: CPT

## 2018-01-15 PROCEDURE — 36415 COLL VENOUS BLD VENIPUNCTURE: CPT

## 2018-01-15 PROCEDURE — 80053 COMPREHEN METABOLIC PANEL: CPT

## 2018-01-15 PROCEDURE — 85027 COMPLETE CBC AUTOMATED: CPT

## 2018-01-16 LAB — ACTH PLAS-MCNC: 10.8 PG/ML (ref 7.2–63.3)

## 2018-01-18 LAB
METANEPH FREE SERPL-MCNC: 20 PG/ML (ref 0–62)
NORMETANEPHRINE SERPL-MCNC: 107 PG/ML (ref 0–145)

## 2018-04-02 ENCOUNTER — ANESTHESIA EVENT (OUTPATIENT)
Dept: PERIOP | Facility: HOSPITAL | Age: 68
DRG: 470 | End: 2018-04-02
Payer: MEDICARE

## 2018-04-02 RX ORDER — SODIUM CHLORIDE 9 MG/ML
125 INJECTION, SOLUTION INTRAVENOUS CONTINUOUS
Status: CANCELLED | OUTPATIENT
Start: 2018-04-27

## 2018-04-03 ENCOUNTER — APPOINTMENT (OUTPATIENT)
Dept: PREADMISSION TESTING | Facility: HOSPITAL | Age: 68
End: 2018-04-03
Payer: MEDICARE

## 2018-04-03 ENCOUNTER — TRANSCRIBE ORDERS (OUTPATIENT)
Dept: ADMINISTRATIVE | Facility: HOSPITAL | Age: 68
End: 2018-04-03

## 2018-04-03 ENCOUNTER — HOSPITAL ENCOUNTER (OUTPATIENT)
Dept: NON INVASIVE DIAGNOSTICS | Facility: HOSPITAL | Age: 68
Discharge: HOME | End: 2018-04-03
Attending: ORTHOPAEDIC SURGERY
Payer: MEDICARE

## 2018-04-03 ENCOUNTER — HOSPITAL ENCOUNTER (OUTPATIENT)
Dept: RADIOLOGY | Facility: HOSPITAL | Age: 68
Discharge: HOME | End: 2018-04-03
Attending: ORTHOPAEDIC SURGERY
Payer: MEDICARE

## 2018-04-03 ENCOUNTER — APPOINTMENT (OUTPATIENT)
Dept: LAB | Facility: HOSPITAL | Age: 68
End: 2018-04-03
Attending: ORTHOPAEDIC SURGERY
Payer: MEDICARE

## 2018-04-03 ENCOUNTER — APPOINTMENT (OUTPATIENT)
Dept: LAB | Facility: HOSPITAL | Age: 68
End: 2018-04-03
Payer: MEDICARE

## 2018-04-03 DIAGNOSIS — Z78.0 MENOPAUSE: ICD-10-CM

## 2018-04-03 DIAGNOSIS — E03.9 MYXEDEMA HEART DISEASE: ICD-10-CM

## 2018-04-03 DIAGNOSIS — M16.12 PRIMARY OSTEOARTHRITIS OF LEFT HIP: ICD-10-CM

## 2018-04-03 DIAGNOSIS — I51.9 MYXEDEMA HEART DISEASE: ICD-10-CM

## 2018-04-03 DIAGNOSIS — C7B.8 SECONDARY NEUROENDOCRINE TUMOR OF RESPIRATORY ORGANS (HCC): ICD-10-CM

## 2018-04-03 DIAGNOSIS — Z79.4 ENCOUNTER FOR LONG-TERM (CURRENT) USE OF INSULIN (HCC): ICD-10-CM

## 2018-04-03 DIAGNOSIS — I63.9 STROKE-IN-EVOLUTION SYNDROME (HCC): ICD-10-CM

## 2018-04-03 DIAGNOSIS — Z01.818 PREOP EXAMINATION: ICD-10-CM

## 2018-04-03 DIAGNOSIS — E78.5 HYPERLIPIDEMIA, UNSPECIFIED HYPERLIPIDEMIA TYPE: ICD-10-CM

## 2018-04-03 DIAGNOSIS — F34.1 DYSTHYMIC DISORDER: ICD-10-CM

## 2018-04-03 DIAGNOSIS — E11.00 UNCONTROLLED TYPE 2 DIABETES MELLITUS WITH HYPEROSMOLARITY WITHOUT COMA, UNSPECIFIED LONG TERM INSULIN USE STATUS: ICD-10-CM

## 2018-04-03 DIAGNOSIS — I10 ESSENTIAL HYPERTENSION, MALIGNANT: ICD-10-CM

## 2018-04-03 DIAGNOSIS — Z01.818 PREOP EXAMINATION: Primary | ICD-10-CM

## 2018-04-03 DIAGNOSIS — I63.9 STROKE-IN-EVOLUTION SYNDROME (HCC): Primary | ICD-10-CM

## 2018-04-03 LAB
ALBUMIN SERPL BCP-MCNC: 4 G/DL (ref 3.5–5)
ALP SERPL-CCNC: 53 U/L (ref 46–116)
ALT SERPL W P-5'-P-CCNC: 20 U/L (ref 12–78)
ANION GAP SERPL CALCULATED.3IONS-SCNC: 16 MMOL/L (ref 4–13)
AST SERPL W P-5'-P-CCNC: 13 U/L (ref 5–45)
ATRIAL RATE: 60 BPM
BACTERIA UR QL AUTO: ABNORMAL /HPF
BASOPHILS # BLD AUTO: 0.02 THOUSANDS/ΜL (ref 0–0.1)
BASOPHILS NFR BLD AUTO: 0 % (ref 0–1)
BILIRUB SERPL-MCNC: 0.57 MG/DL (ref 0.2–1)
BILIRUB UR QL STRIP: ABNORMAL
BILIRUB UR QL STRIP: ABNORMAL
BUN SERPL-MCNC: 37 MG/DL (ref 5–25)
CALCIUM SERPL-MCNC: 9.1 MG/DL (ref 8.3–10.1)
CHLORIDE SERPL-SCNC: 100 MMOL/L (ref 100–108)
CHOLEST SERPL-MCNC: 133 MG/DL (ref 50–200)
CLARITY UR: ABNORMAL
CLARITY UR: ABNORMAL
CO2 SERPL-SCNC: 24 MMOL/L (ref 21–32)
COLOR UR: YELLOW
COLOR UR: YELLOW
CREAT SERPL-MCNC: 0.96 MG/DL (ref 0.6–1.3)
EOSINOPHIL # BLD AUTO: 0.17 THOUSAND/ΜL (ref 0–0.61)
EOSINOPHIL NFR BLD AUTO: 2 % (ref 0–6)
ERYTHROCYTE [DISTWIDTH] IN BLOOD BY AUTOMATED COUNT: 13 % (ref 11.6–15.1)
GFR SERPL CREATININE-BSD FRML MDRD: 61 ML/MIN/1.73SQ M
GLUCOSE P FAST SERPL-MCNC: 163 MG/DL (ref 65–99)
GLUCOSE UR STRIP-MCNC: NEGATIVE MG/DL
GLUCOSE UR STRIP-MCNC: NEGATIVE MG/DL
HCT VFR BLD AUTO: 38.5 % (ref 34.8–46.1)
HDLC SERPL-MCNC: 32 MG/DL (ref 40–60)
HGB BLD-MCNC: 13.2 G/DL (ref 11.5–15.4)
HGB UR QL STRIP.AUTO: NEGATIVE
HGB UR QL STRIP.AUTO: NEGATIVE
KETONES UR STRIP-MCNC: ABNORMAL MG/DL
KETONES UR STRIP-MCNC: ABNORMAL MG/DL
LDLC SERPL CALC-MCNC: 68 MG/DL (ref 0–100)
LDLC SERPL DIRECT ASSAY-MCNC: 82 MG/DL (ref 0–100)
LEUKOCYTE ESTERASE UR QL STRIP: ABNORMAL
LEUKOCYTE ESTERASE UR QL STRIP: ABNORMAL
LYMPHOCYTES # BLD AUTO: 1.61 THOUSANDS/ΜL (ref 0.6–4.47)
LYMPHOCYTES NFR BLD AUTO: 22 % (ref 14–44)
MCH RBC QN AUTO: 31.4 PG (ref 26.8–34.3)
MCHC RBC AUTO-ENTMCNC: 34.3 G/DL (ref 31.4–37.4)
MCV RBC AUTO: 92 FL (ref 82–98)
MONOCYTES # BLD AUTO: 0.86 THOUSAND/ΜL (ref 0.17–1.22)
MONOCYTES NFR BLD AUTO: 12 % (ref 4–12)
NEUTROPHILS # BLD AUTO: 4.55 THOUSANDS/ΜL (ref 1.85–7.62)
NEUTS SEG NFR BLD AUTO: 64 % (ref 43–75)
NITRITE UR QL STRIP: NEGATIVE
NITRITE UR QL STRIP: NEGATIVE
NON-SQ EPI CELLS URNS QL MICRO: ABNORMAL /HPF
NRBC BLD AUTO-RTO: 0 /100 WBCS
P AXIS: -13 DEGREES
PH UR STRIP.AUTO: 5 [PH] (ref 4.5–8)
PH UR STRIP.AUTO: 5 [PH] (ref 4.5–8)
PLATELET # BLD AUTO: 340 THOUSANDS/UL (ref 149–390)
PMV BLD AUTO: 11.1 FL (ref 8.9–12.7)
POTASSIUM SERPL-SCNC: 4.2 MMOL/L (ref 3.5–5.3)
PR INTERVAL: 122 MS
PROT SERPL-MCNC: 7.5 G/DL (ref 6.4–8.2)
PROT UR STRIP-MCNC: NEGATIVE MG/DL
PROT UR STRIP-MCNC: NEGATIVE MG/DL
QRS AXIS: 6 DEGREES
QRSD INTERVAL: 88 MS
QT INTERVAL: 424 MS
QTC INTERVAL: 424 MS
RBC # BLD AUTO: 4.2 MILLION/UL (ref 3.81–5.12)
RBC #/AREA URNS AUTO: ABNORMAL /HPF
SODIUM SERPL-SCNC: 140 MMOL/L (ref 136–145)
SP GR UR STRIP.AUTO: >=1.03 (ref 1–1.03)
SP GR UR STRIP.AUTO: >=1.03 (ref 1–1.03)
T WAVE AXIS: 43 DEGREES
TRIGL SERPL-MCNC: 166 MG/DL
TSH SERPL DL<=0.05 MIU/L-ACNC: 0.74 UIU/ML (ref 0.36–3.74)
UROBILINOGEN UR QL STRIP.AUTO: 0.2 E.U./DL
UROBILINOGEN UR QL STRIP.AUTO: 0.2 E.U./DL
VENTRICULAR RATE: 60 BPM
WBC # BLD AUTO: 7.21 THOUSAND/UL (ref 4.31–10.16)
WBC #/AREA URNS AUTO: ABNORMAL /HPF

## 2018-04-03 PROCEDURE — 84443 ASSAY THYROID STIM HORMONE: CPT

## 2018-04-03 PROCEDURE — 85025 COMPLETE CBC W/AUTO DIFF WBC: CPT

## 2018-04-03 PROCEDURE — 71046 X-RAY EXAM CHEST 2 VIEWS: CPT

## 2018-04-03 PROCEDURE — 83721 ASSAY OF BLOOD LIPOPROTEIN: CPT

## 2018-04-03 PROCEDURE — 80053 COMPREHEN METABOLIC PANEL: CPT

## 2018-04-03 PROCEDURE — 81001 URINALYSIS AUTO W/SCOPE: CPT | Performed by: FAMILY MEDICINE

## 2018-04-03 PROCEDURE — 80061 LIPID PANEL: CPT

## 2018-04-03 PROCEDURE — 36415 COLL VENOUS BLD VENIPUNCTURE: CPT

## 2018-04-03 PROCEDURE — 93005 ELECTROCARDIOGRAM TRACING: CPT

## 2018-04-03 PROCEDURE — 93010 ELECTROCARDIOGRAM REPORT: CPT | Performed by: INTERNAL MEDICINE

## 2018-04-03 RX ORDER — FENOFIBRATE 130 MG/1
130 CAPSULE ORAL
COMMUNITY
End: 2018-04-24 | Stop reason: CLARIF

## 2018-04-03 RX ORDER — LIOTHYRONINE SODIUM 25 UG/1
25 TABLET ORAL DAILY
COMMUNITY
End: 2018-04-03

## 2018-04-03 RX ORDER — LEVETIRACETAM 250 MG/1
500 TABLET ORAL 2 TIMES DAILY
COMMUNITY

## 2018-04-03 NOTE — ANESTHESIA PREPROCEDURE EVALUATION
Review of Systems/Medical History  Patient summary reviewed  Chart reviewed  No history of anesthetic complications     Cardiovascular  Hyperlipidemia, Hypertension on > 1 medication,   Comment:  says her "heart stopped" during a 2014 operation at "[facility]"  I am only finding references to MCA stroke/hypoglycemia  Admission during that time period  She's most likely on Plavix for that Dx, but chart is a combo of poor historian and lack of preop documentation  ,  Pulmonary  Negative pulmonary ROS        GI/Hepatic  Negative GI/hepatic ROS               Endo/Other  Diabetes well controlled type 2 Insulin, History of thyroid disease , hypothyroidism,      GYN  Negative gynecology ROS          Hematology  Negative hematology ROS      Musculoskeletal    Arthritis     Neurology  Seizures well controlled,  CVA , no residual symptoms,   Comment: Basilar artery aneurysm Psychology   Anxiety, Depression , being treated for depression,              Physical Exam    Airway    Mallampati score: II  TM Distance: >3 FB  Neck ROM: full     Dental   No notable dental hx     Cardiovascular  Rhythm: regular, Rate: normal, Cardiovascular exam normal    Pulmonary  Pulmonary exam normal Breath sounds clear to auscultation,     Other Findings        Anesthesia Plan  ASA Score- 3     Anesthesia Type- spinal and regional with ASA Monitors  Additional Monitors:   Airway Plan:     Comment: Need medical evaluation from her primary, to confirm/deny her PMH issues  I did empirically ask them to stop clopidogrel one week before procedure        Plan Factors-Patient not instructed to abstain from smoking on day of procedure  Patient did not smoke on day of surgery  Induction- intravenous  Postoperative Plan-     Informed Consent- Anesthetic plan and risks discussed with patient and spouse

## 2018-04-03 NOTE — PRE-PROCEDURE INSTRUCTIONS
Pre-Surgery Instructions:   Medication Instructions    amLODIPine (NORVASC) 10 mg tablet Patient was instructed to contact Physician for medication instruction   aspirin 81 mg chewable tablet Patient was instructed to contact Physician for medication instruction   atorvastatin (LIPITOR) 20 mg tablet Patient was instructed to contact Physician for medication instruction   clopidogrel (PLAVIX) 75 mg tablet Patient was instructed to contact Physician for medication instruction   fenofibrate (TRICOR) 145 mg tablet Patient was instructed to contact Physician for medication instruction   fenofibrate micronized (ANTARA) 130 MG capsule Patient was instructed to contact Physician for medication instruction   FLUoxetine (PROzac) 20 MG tablet Patient was instructed to contact Physician for medication instruction   Glucosamine-Chondroit-Vit C-Mn (GLUCOSAMINE CHONDR 1500 COMPLX PO) Patient was instructed to contact Physician for medication instruction   hydrochlorothiazide (HYDRODIURIL) 25 mg tablet Patient was instructed to contact Physician for medication instruction   insulin glargine (LANTUS) 100 units/mL subcutaneous injection Patient was instructed to contact Physician for medication instruction   insulin lispro (HumaLOG) 100 units/mL injection Patient was instructed to contact Physician for medication instruction   levETIRAcetam (KEPPRA) 250 mg tablet Patient was instructed to contact Physician for medication instruction   levothyroxine 75 mcg tablet Patient was instructed to contact Physician for medication instruction   lisinopril (ZESTRIL) 30 mg tablet Patient was instructed to contact Physician for medication instruction   metFORMIN (GLUCOPHAGE) 1000 MG tablet Patient was instructed to contact Physician for medication instruction   Omega-3 Fatty Acids (FISH OIL) 1000 MG CPDR Patient was instructed to contact Physician for medication instruction      [DISCONTINUED] liothyronine (CYTOMEL) 25 mcg tablet Patient was instructed to contact Physician for medication instruction  Pt and spouse instructed by Dr Maximo Segura to check with Dr Natasha Francois PCP re: what meds to take the morning of surgery and when to hold Plavix  Pt and spouse given/reviewed [facility] preop instructions and chlorhexadine soap   Pt to hold asa/NSAIDS/vitamins/herbal supplements one week before surgery or as per Dr Kaiden Arias

## 2018-04-23 NOTE — PERIOPERATIVE NURSING NOTE
Tc to Dr Wu's office, Gia Fatima re: need for pt's cardiac and neuro clearance for upcoming surgery   She reports she will have it fax'd

## 2018-04-24 ENCOUNTER — OFFICE VISIT (OUTPATIENT)
Dept: CARDIOLOGY CLINIC | Facility: MEDICAL CENTER | Age: 68
End: 2018-04-24
Payer: MEDICARE

## 2018-04-24 VITALS
HEART RATE: 67 BPM | WEIGHT: 150 LBS | DIASTOLIC BLOOD PRESSURE: 60 MMHG | HEIGHT: 59 IN | SYSTOLIC BLOOD PRESSURE: 118 MMHG | BODY MASS INDEX: 30.24 KG/M2

## 2018-04-24 DIAGNOSIS — I72.5 ANEURYSM OF BASILAR ARTERY (HCC): ICD-10-CM

## 2018-04-24 DIAGNOSIS — I10 HYPERTENSION, UNSPECIFIED TYPE: ICD-10-CM

## 2018-04-24 DIAGNOSIS — R56.9 SEIZURE (HCC): ICD-10-CM

## 2018-04-24 DIAGNOSIS — Q21.1 PFO (PATENT FORAMEN OVALE): ICD-10-CM

## 2018-04-24 DIAGNOSIS — Z86.73 HISTORY OF STROKE: ICD-10-CM

## 2018-04-24 DIAGNOSIS — IMO0001 INSULIN DEPENDENT DIABETES MELLITUS: ICD-10-CM

## 2018-04-24 DIAGNOSIS — Z01.818 PRE-OPERATIVE CLEARANCE: Primary | ICD-10-CM

## 2018-04-24 PROBLEM — Q21.12 PFO (PATENT FORAMEN OVALE): Status: ACTIVE | Noted: 2018-04-24

## 2018-04-24 PROCEDURE — 99203 OFFICE O/P NEW LOW 30 MIN: CPT | Performed by: INTERNAL MEDICINE

## 2018-04-24 PROCEDURE — 93000 ELECTROCARDIOGRAM COMPLETE: CPT | Performed by: INTERNAL MEDICINE

## 2018-04-24 RX ORDER — CELECOXIB 100 MG/1
100 CAPSULE ORAL 2 TIMES DAILY
COMMUNITY
End: 2018-04-30 | Stop reason: HOSPADM

## 2018-04-24 NOTE — LETTER
Cardiology Pre Operative Clearance      PRE OPERATIVE CARDIAC RISK ASSESSMENT    04/24/18    Maximo Martinez  1950  [id number]    Date of Surgery:04/27/21018     Type of Surgery: Left  Hip Surgery     Surgeon: Dr Gaurav Jj     {SLA AMB CARDIAC CONTRAINDICATIONS:[id number]}    Physician Comment: ***    Anticoagulation:  Yes Aspirin, Plavix on Hold     Physician Comment: ***    Marcos Bautista

## 2018-04-24 NOTE — PROGRESS NOTES
Cardiology Consultation     Jayda Spain  [id number]  1950  [facility] [place]  [address] [place] [address] [address]    I had the pleasure of seeing Jayda Spain for a consultation regarding preop clearance     History of the Presenting Illness, Discussion/Summary and My Plan are as follows: She is a pleasant 71-year-old lady with a history of prior stroke-2013-left middle cerebral artery with negative evaluation including a CTA, ANASTASIA demonstrated a small PFO  She is on aspirin and Plavix at this time  She has also had admissions for changes in mental status-attributed to hypoglycemia/seizures etc   She does see a neurologist at [facility]  Otherwise she also has diabetes  She does not have any known cardiac disease  She will be undergoing hip replacement surgery and presents for preoperative clearance prior to that  She is asymptomatic from a cardiac standpoint  Plan:    Preoperative evaluation prior to hip replacement surgery:  Check an echocardiogram   If this is unremarkable, no further evaluation  ECG demonstrates normal sinus rhythm with poor R-wave progression  Hypertension:  Controlled    Dyslipidemia:  Continue statin, lipid profile is a good  She is also on fenofibrate  No other changes are being made to her medications  Considering that she is asymptomatic, she will follow up as needed  ADDENDUM:  THE ORIGINAL  PLAN WAS FOR THE PATIENT OBTAIN AN ECHOCARDIOGRAM AND IF NEGATIVE, TO PROCEED WITH HIP REPLACEMENT SURGERY  HOWEVER SHE HAS NOT OBTAIN THE ECHOCARDIOGRAM YET  SURGERIES TOMORROW  SINCE SHE HAS REMAINED ASYMPTOMATIC, SHE WILL STILL BE CLEARED AT INTERMEDIATE RISK  PRIOR ECHOCARDIOGRAM - ANASTASIA FROM 5 YEARS AGO  SHOWED PRESERVED LV SYSTOLIC FUNCTION AND PATIENT IS ASYMPTOMATIC  Results for Carmita Rockingham (MRN [id number]) as of 4/24/2018 14:10   Ref   Range 4/3/2018 11:57   Cholesterol Latest Ref Range: 50 - 200 mg/dL 133   Triglycerides Latest Ref Range: <=150 mg/dL 166 (H)   HDL Latest Ref Range: 40 - 60 mg/dL 32 (L)   LDL Calculated Latest Ref Range: 0 - 100 mg/dL 68   LDL CHOLESTEROL DIRECT Latest Ref Range: 0 - 100 mg/dl 82     1  Pre-operative clearance  POCT ECG   2  Hypertension, unspecified type  POCT ECG     Patient Active Problem List   Diagnosis    History of stroke    Insulin dependent diabetes mellitus ([place] [address] )    Hypertension    Hypothyroidism    Aneurysm of basilar artery ([place] [address] )    Seizure ([place] [address] )     Past Medical History:   Diagnosis Date    Aneurysm of basilar artery ([place] [address] ) 07/2015    Arthritis     Cardiac arrest ([place] [address] ) 2014    Complex partial seizure with impairment of consciousness at onset [facility]) 07/2017    Dependent on wheelchair     "at times"    Diabetes mellitus ([place] [address] )     IDDM    Disease of thyroid gland     Hyperlipidemia     Hypertension     Left hip pain     Left knee pain     Muscle weakness     right sided weakness    Risk for falls     Seizures ([place] [address] )     "pt reports none recent"    Sleep apnea     "doesn't use machine"    Stenosis of middle cerebral artery 07/2015    Stroke ([place] [address] )     B BG, R frontal, L parietal, L pontine    Unsteady gait     Uses walker      Social History     Social History    Marital status: /Civil Union     Spouse name: N/A    Number of children: N/A    Years of education: N/A     Occupational History    Not on file       Social History Main Topics    Smoking status: Never Smoker    Smokeless tobacco: Never Used    Alcohol use Yes      Comment: social    Drug use: No    Sexual activity: Not on file     Other Topics Concern    Not on file     Social History Narrative    No narrative on file      Family History   Problem Relation Age of Onset    Arthritis Mother     Heart disease Father     Diabetes Father      Past Surgical History:   Procedure Laterality Date    ABDOMINAL SURGERY benign mass per pt    DILATION AND CURETTAGE OF UTERUS      HYSTERECTOMY      TONSILLECTOMY      WISDOM TOOTH EXTRACTION         Current Outpatient Prescriptions:     amLODIPine (NORVASC) 10 mg tablet, Take 10 mg by mouth daily, Disp: , Rfl:     aspirin 81 mg chewable tablet, Chew 81 mg daily, Disp: , Rfl:     atorvastatin (LIPITOR) 20 mg tablet, Take 20 mg by mouth daily, Disp: , Rfl:     celecoxib (CeleBREX) 100 mg capsule, Take 100 mg by mouth 2 (two) times a day, Disp: , Rfl:     clopidogrel (PLAVIX) 75 mg tablet, Take 75 mg by mouth daily, Disp: , Rfl:     fenofibrate (TRICOR) 145 mg tablet, Take 145 mg by mouth daily, Disp: , Rfl:     fenofibrate micronized (ANTARA) 130 MG capsule, Take 130 mg by mouth every morning before breakfast, Disp: , Rfl:     FLUoxetine (PROzac) 20 MG tablet, Take 20 mg by mouth daily at bedtime  , Disp: , Rfl:     Glucosamine-Chondroit-Vit C-Mn (GLUCOSAMINE CHONDR 1500 COMPLX PO), Take 1 tablet by mouth 2 (two) times a day, Disp: , Rfl:     hydrochlorothiazide (HYDRODIURIL) 25 mg tablet, Take 25 mg by mouth daily, Disp: , Rfl:     insulin glargine (LANTUS) 100 units/mL subcutaneous injection, Inject 7 Units under the skin daily at bedtime, Disp: , Rfl:     insulin lispro (HumaLOG) 100 units/mL injection, Inject 3 Units under the skin 3 (three) times a day before meals, Disp: , Rfl:     levETIRAcetam (KEPPRA) 250 mg tablet, Take 500 mg by mouth 2 (two) times a day, Disp: , Rfl:     levothyroxine 75 mcg tablet, Take 75 mcg by mouth daily in the early morning  , Disp: , Rfl:     lisinopril (ZESTRIL) 30 mg tablet, Take 30 mg by mouth daily, Disp: , Rfl:     metFORMIN (GLUCOPHAGE) 1000 MG tablet, Take 1,000 mg by mouth daily with breakfast  , Disp: , Rfl:     Omega-3 Fatty Acids (FISH OIL) 1000 MG CPDR, Take 1,000 mg by mouth 2 (two) times a day  , Disp: , Rfl:   No Known Allergies  Vitals:    04/24/18 1312   BP: 118/60   BP Location: Left arm   Patient Position: Sitting   Cuff Size: Adult   Pulse: 67   Weight: 68 kg (150 lb)   Height: 4' 11 02" (1 499 m)       Labs:  Hospital Outpatient Visit on 04/03/2018   Component Date Value    Ventricular Rate 04/03/2018 60     Atrial Rate 04/03/2018 60     NE Interval 04/03/2018 122     QRSD Interval 04/03/2018 88     QT Interval 04/03/2018 424     QTC Interval 04/03/2018 424     P Axis 04/03/2018 -13     QRS [place] 04/03/2018 6     T Wave [place] 04/03/2018 43    Appointment on 04/03/2018   Component Date Value    LDL Direct 04/03/2018 82     Cholesterol 04/03/2018 133     Triglycerides 04/03/2018 166*    HDL, Direct 04/03/2018 32*    LDL Calculated 04/03/2018 68     TSH 3RD GENERATON 04/03/2018 0 738    Appointment on 04/03/2018   Component Date Value    WBC 04/03/2018 7 21     RBC 04/03/2018 4 20     Hemoglobin 04/03/2018 13 2     Hematocrit 04/03/2018 38 5     MCV 04/03/2018 92     MCH 04/03/2018 31 4     MCHC 04/03/2018 34 3     RDW 04/03/2018 13 0     MPV 04/03/2018 11 1     Platelets 94/28/9552 340     nRBC 04/03/2018 0     Neutrophils Relative 04/03/2018 64     Lymphocytes Relative 04/03/2018 22     Monocytes Relative 04/03/2018 12     Eosinophils Relative 04/03/2018 2     Basophils Relative 04/03/2018 0     Neutrophils Absolute 04/03/2018 4 55     Lymphocytes Absolute 04/03/2018 1 61     Monocytes Absolute 04/03/2018 0 86     Eosinophils Absolute 04/03/2018 0 17     Basophils Absolute 04/03/2018 0 02     Sodium 04/03/2018 140     Potassium 04/03/2018 4 2     Chloride 04/03/2018 100     CO2 04/03/2018 24     Anion Gap 04/03/2018 16*    BUN 04/03/2018 37*    Creatinine 04/03/2018 0 96     Glucose, Fasting 04/03/2018 163*    Calcium 04/03/2018 9 1     AST 04/03/2018 13     ALT 04/03/2018 20     Alkaline Phosphatase 04/03/2018 53     Total Protein 04/03/2018 7 5     Albumin 04/03/2018 4 0     Total Bilirubin 04/03/2018 0 57     eGFR 04/03/2018 61    Transcribe Orders on 04/03/2018 Component Date Value    Color, UA 04/03/2018 Yellow     Clarity, UA 04/03/2018 Cloudy     Specific Gravity, UA 04/03/2018 >=1 030     pH, UA 04/03/2018 5 0     Leukocytes, UA 04/03/2018 Small*    Nitrite, UA 04/03/2018 Negative     Protein, UA 04/03/2018 Negative     Glucose, UA 04/03/2018 Negative     Ketones, UA 04/03/2018 Trace*    Urobilinogen, UA 04/03/2018 0 2     Bilirubin, UA 04/03/2018 Interference- unable to analyze*    Blood, UA 04/03/2018 Negative     RBC, UA 04/03/2018 None Seen     WBC, UA 04/03/2018 10-20*    Epithelial Cells 04/03/2018 Occasional     Bacteria, UA 04/03/2018 Occasional    Transcribe Orders on 04/03/2018   Component Date Value    Color, UA 04/03/2018 Yellow     Clarity, UA 04/03/2018 Cloudy     Specific Gravity, UA 04/03/2018 >=1 030     pH, UA 04/03/2018 5 0     Leukocytes, UA 04/03/2018 Small*    Nitrite, UA 04/03/2018 Negative     Protein, UA 04/03/2018 Negative     Glucose, UA 04/03/2018 Negative     Ketones, UA 04/03/2018 Trace*    Urobilinogen, UA 04/03/2018 0 2     Bilirubin, UA 04/03/2018 Interference- unable to analyze*    Blood, UA 04/03/2018 Negative        Imaging: Xr Chest Pa & Lateral    Result Date: 4/5/2018  Narrative: CHEST INDICATION:   Z01 818: Encounter for other preprocedural examination  M16 12: Unilateral primary osteoarthritis, left hip  COMPARISON:  7/23/2017, CT chest, abdomen and pelvis 6/8/2016 EXAM PERFORMED/VIEWS:  XR CHEST PA & LATERAL FINDINGS: Cardiomediastinal silhouette appears unremarkable  The lungs are clear  No pneumothorax or pleural effusion  Osseous structures appear within normal limits for patient age  Impression: No acute cardiopulmonary disease  Workstation performed: [id number]       Review of Systems:  Review of Systems   Constitutional: Negative  HENT: Negative  Eyes: Negative  Respiratory: Negative  Cardiovascular: Negative  Genitourinary: Negative      Musculoskeletal: Positive for arthralgias and back pain  Negative for gait problem, joint swelling and myalgias  Allergic/Immunologic: Negative  Neurological: Negative  Hematological: Negative  Psychiatric/Behavioral: Negative  Physical Exam:  Physical Exam   Constitutional: She is oriented to person, place, and time  She appears well-developed and well-nourished  No distress  Obese   HENT:   Head: Normocephalic and atraumatic  Eyes: Conjunctivae and EOM are normal  Pupils are equal, round, and reactive to light  Right eye exhibits no discharge  Left eye exhibits no discharge  Neck: Normal range of motion  Neck supple  No JVD present  No tracheal deviation present  No thyromegaly present  Cardiovascular: Normal rate, normal heart sounds and intact distal pulses  Exam reveals no friction rub  No murmur heard  Pulmonary/Chest: Effort normal and breath sounds normal  No stridor  No respiratory distress  She has no wheezes  Abdominal: Soft  Bowel sounds are normal  She exhibits no distension  There is no tenderness  Musculoskeletal: Normal range of motion  She exhibits no edema or deformity  Neurological: She is alert and oriented to person, place, and time  She has normal reflexes  Skin: Skin is warm and dry  No rash noted  She is not diaphoretic  No erythema  No pallor

## 2018-04-26 NOTE — PROGRESS NOTES
[address] [facility] [place] [address]      PATIENT NAME: Maximo Martinez; [id number]    YOB: 1950    DATE LAST EVALUATED:  4/24/2018    DETAILS OF SURGERY:   Hip arthroplasty    DATE OF SURGERY:  4/27/2018    ANESTHESIA: Choice     CARDIAC RISK ASSESSMENT:    Can proceed at Intermediate risk without further testing which is unlikely to change the management  ANTIPLATELET/ANTICOAGULANT AGENTS:  Patient is on aspirin and Plavix, can be held for the procedure If potential bleeding risk is deemed to be high and restarted when deemed safe from a bleeding standpoint    Please call us with any questions or concerns regarding his management  COMMENTS: Please continue statin perioperatively  Please call with any questions or concerns regarding patient's management

## 2018-04-27 ENCOUNTER — ANESTHESIA (OUTPATIENT)
Dept: PERIOP | Facility: HOSPITAL | Age: 68
DRG: 470 | End: 2018-04-27
Payer: MEDICARE

## 2018-04-27 ENCOUNTER — HOSPITAL ENCOUNTER (INPATIENT)
Facility: HOSPITAL | Age: 68
LOS: 3 days | Discharge: NURSING, REHAB OR LONG-TERM CARE | DRG: 470 | End: 2018-04-30
Attending: ORTHOPAEDIC SURGERY | Admitting: ORTHOPAEDIC SURGERY
Payer: MEDICARE

## 2018-04-27 ENCOUNTER — APPOINTMENT (OUTPATIENT)
Dept: RADIOLOGY | Facility: HOSPITAL | Age: 68
DRG: 470 | End: 2018-04-27
Payer: MEDICARE

## 2018-04-27 DIAGNOSIS — M16.12 OSTEOARTHRITIS OF LEFT HIP, UNSPECIFIED OSTEOARTHRITIS TYPE: Primary | ICD-10-CM

## 2018-04-27 LAB
ABO GROUP BLD: NORMAL
ALBUMIN SERPL BCP-MCNC: 2.9 G/DL (ref 3.5–5)
ALP SERPL-CCNC: 57 U/L (ref 46–116)
ALT SERPL W P-5'-P-CCNC: 22 U/L (ref 12–78)
ANION GAP SERPL CALCULATED.3IONS-SCNC: 15 MMOL/L (ref 4–13)
AST SERPL W P-5'-P-CCNC: 24 U/L (ref 5–45)
BILIRUB SERPL-MCNC: 0.25 MG/DL (ref 0.2–1)
BLD GP AB SCN SERPL QL: NEGATIVE
BUN SERPL-MCNC: 32 MG/DL (ref 5–25)
CALCIUM SERPL-MCNC: 8.4 MG/DL (ref 8.3–10.1)
CHLORIDE SERPL-SCNC: 100 MMOL/L (ref 100–108)
CO2 SERPL-SCNC: 19 MMOL/L (ref 21–32)
CREAT SERPL-MCNC: 1.42 MG/DL (ref 0.6–1.3)
GFR SERPL CREATININE-BSD FRML MDRD: 38 ML/MIN/1.73SQ M
GLUCOSE SERPL-MCNC: 155 MG/DL (ref 65–140)
GLUCOSE SERPL-MCNC: 184 MG/DL (ref 65–140)
GLUCOSE SERPL-MCNC: 457 MG/DL (ref 65–140)
GLUCOSE SERPL-MCNC: 567 MG/DL (ref 65–140)
GLUCOSE SERPL-MCNC: >500 MG/DL (ref 65–140)
INR PPP: 1.06 (ref 0.86–1.16)
POTASSIUM SERPL-SCNC: 5.1 MMOL/L (ref 3.5–5.3)
PROT SERPL-MCNC: 5.8 G/DL (ref 6.4–8.2)
PROTHROMBIN TIME: 13.8 SECONDS (ref 12.1–14.4)
RH BLD: NEGATIVE
SODIUM SERPL-SCNC: 134 MMOL/L (ref 136–145)
SPECIMEN EXPIRATION DATE: NORMAL

## 2018-04-27 PROCEDURE — 85610 PROTHROMBIN TIME: CPT | Performed by: PHYSICIAN ASSISTANT

## 2018-04-27 PROCEDURE — C1776 JOINT DEVICE (IMPLANTABLE): HCPCS | Performed by: ORTHOPAEDIC SURGERY

## 2018-04-27 PROCEDURE — 82947 ASSAY GLUCOSE BLOOD QUANT: CPT | Performed by: INTERNAL MEDICINE

## 2018-04-27 PROCEDURE — C1713 ANCHOR/SCREW BN/BN,TIS/BN: HCPCS | Performed by: ORTHOPAEDIC SURGERY

## 2018-04-27 PROCEDURE — 73501 X-RAY EXAM HIP UNI 1 VIEW: CPT

## 2018-04-27 PROCEDURE — 80053 COMPREHEN METABOLIC PANEL: CPT | Performed by: INTERNAL MEDICINE

## 2018-04-27 PROCEDURE — 86901 BLOOD TYPING SEROLOGIC RH(D): CPT | Performed by: ORTHOPAEDIC SURGERY

## 2018-04-27 PROCEDURE — 0SRB01A REPLACEMENT OF LEFT HIP JOINT WITH METAL SYNTHETIC SUBSTITUTE, UNCEMENTED, OPEN APPROACH: ICD-10-PCS | Performed by: ORTHOPAEDIC SURGERY

## 2018-04-27 PROCEDURE — 86920 COMPATIBILITY TEST SPIN: CPT

## 2018-04-27 PROCEDURE — G8987 SELF CARE CURRENT STATUS: HCPCS

## 2018-04-27 PROCEDURE — 97166 OT EVAL MOD COMPLEX 45 MIN: CPT

## 2018-04-27 PROCEDURE — 97163 PT EVAL HIGH COMPLEX 45 MIN: CPT

## 2018-04-27 PROCEDURE — G8979 MOBILITY GOAL STATUS: HCPCS

## 2018-04-27 PROCEDURE — G8978 MOBILITY CURRENT STATUS: HCPCS

## 2018-04-27 PROCEDURE — G8988 SELF CARE GOAL STATUS: HCPCS

## 2018-04-27 PROCEDURE — 82948 REAGENT STRIP/BLOOD GLUCOSE: CPT

## 2018-04-27 PROCEDURE — 86850 RBC ANTIBODY SCREEN: CPT | Performed by: ORTHOPAEDIC SURGERY

## 2018-04-27 PROCEDURE — 86900 BLOOD TYPING SEROLOGIC ABO: CPT | Performed by: ORTHOPAEDIC SURGERY

## 2018-04-27 DEVICE — PINNACLE HIP SOLUTIONS ALTRX POLYETHYLENE ACETABULAR LINER +4 NEUTRAL 32MM ID 48MM OD
Type: IMPLANTABLE DEVICE | Site: HIP | Status: FUNCTIONAL
Brand: PINNACLE ALTRX

## 2018-04-27 DEVICE — PINNACLE POROCOAT ACETABULAR SHELL SECTOR II 48MM OD
Type: IMPLANTABLE DEVICE | Site: HIP | Status: FUNCTIONAL
Brand: PINNACLE POROCOAT

## 2018-04-27 DEVICE — ACTIS DUOFIX HIP PROSTHESIS (FEMORAL STEM 12/14 TAPER CEMENTLESS SIZE 1 HIGH COLLAR)  CE
Type: IMPLANTABLE DEVICE | Site: HIP | Status: FUNCTIONAL
Brand: ACTIS

## 2018-04-27 DEVICE — PINNACLE CANCELLOUS BONE SCREW 6.5MM X 30MM
Type: IMPLANTABLE DEVICE | Site: HIP | Status: FUNCTIONAL
Brand: PINNACLE

## 2018-04-27 DEVICE — ARTICUL/EZE FEMORAL HEAD DIAMETER 32MM +5 12/14 TAPER
Type: IMPLANTABLE DEVICE | Site: HIP | Status: FUNCTIONAL
Brand: ARTICUL/EZE

## 2018-04-27 DEVICE — PINNACLE CANCELLOUS BONE SCREW 6.5MM X 25MM
Type: IMPLANTABLE DEVICE | Site: HIP | Status: FUNCTIONAL
Brand: PINNACLE

## 2018-04-27 RX ORDER — DIPHENHYDRAMINE HYDROCHLORIDE 50 MG/ML
50 INJECTION INTRAMUSCULAR; INTRAVENOUS EVERY 6 HOURS PRN
Status: DISCONTINUED | OUTPATIENT
Start: 2018-04-27 | End: 2018-04-28

## 2018-04-27 RX ORDER — FENOFIBRATE 145 MG/1
145 TABLET, COATED ORAL DAILY
Status: DISCONTINUED | OUTPATIENT
Start: 2018-04-28 | End: 2018-04-30 | Stop reason: HOSPADM

## 2018-04-27 RX ORDER — AMLODIPINE BESYLATE 10 MG/1
10 TABLET ORAL DAILY
Status: DISCONTINUED | OUTPATIENT
Start: 2018-04-28 | End: 2018-04-28

## 2018-04-27 RX ORDER — DOCUSATE SODIUM 100 MG/1
100 CAPSULE, LIQUID FILLED ORAL 2 TIMES DAILY
Status: DISCONTINUED | OUTPATIENT
Start: 2018-04-27 | End: 2018-04-30 | Stop reason: HOSPADM

## 2018-04-27 RX ORDER — EPHEDRINE SULFATE 50 MG/ML
INJECTION, SOLUTION INTRAVENOUS AS NEEDED
Status: DISCONTINUED | OUTPATIENT
Start: 2018-04-27 | End: 2018-04-27 | Stop reason: SURG

## 2018-04-27 RX ORDER — TRANEXAMIC ACID 100 MG/ML
INJECTION, SOLUTION INTRAVENOUS AS NEEDED
Status: DISCONTINUED | OUTPATIENT
Start: 2018-04-27 | End: 2018-04-27 | Stop reason: SURG

## 2018-04-27 RX ORDER — ONDANSETRON 2 MG/ML
4 INJECTION INTRAMUSCULAR; INTRAVENOUS ONCE
Status: DISCONTINUED | OUTPATIENT
Start: 2018-04-27 | End: 2018-04-27 | Stop reason: HOSPADM

## 2018-04-27 RX ORDER — TETRACAINE HCL 10 MG/ML
INJECTION SUBARACHNOID AS NEEDED
Status: DISCONTINUED | OUTPATIENT
Start: 2018-04-27 | End: 2018-04-27 | Stop reason: SURG

## 2018-04-27 RX ORDER — DEXAMETHASONE SODIUM PHOSPHATE 4 MG/ML
INJECTION, SOLUTION INTRA-ARTICULAR; INTRALESIONAL; INTRAMUSCULAR; INTRAVENOUS; SOFT TISSUE AS NEEDED
Status: DISCONTINUED | OUTPATIENT
Start: 2018-04-27 | End: 2018-04-27 | Stop reason: SURG

## 2018-04-27 RX ORDER — ONDANSETRON 2 MG/ML
4 INJECTION INTRAMUSCULAR; INTRAVENOUS ONCE AS NEEDED
Status: DISCONTINUED | OUTPATIENT
Start: 2018-04-27 | End: 2018-04-27 | Stop reason: HOSPADM

## 2018-04-27 RX ORDER — FLUOXETINE HYDROCHLORIDE 20 MG/1
20 CAPSULE ORAL
Status: DISCONTINUED | OUTPATIENT
Start: 2018-04-27 | End: 2018-04-30 | Stop reason: HOSPADM

## 2018-04-27 RX ORDER — CELECOXIB 200 MG/1
200 CAPSULE ORAL DAILY
Status: DISCONTINUED | OUTPATIENT
Start: 2018-04-27 | End: 2018-04-28

## 2018-04-27 RX ORDER — MAGNESIUM HYDROXIDE 1200 MG/15ML
LIQUID ORAL AS NEEDED
Status: DISCONTINUED | OUTPATIENT
Start: 2018-04-27 | End: 2018-04-27 | Stop reason: HOSPADM

## 2018-04-27 RX ORDER — INSULIN GLARGINE 100 [IU]/ML
10 INJECTION, SOLUTION SUBCUTANEOUS
Status: DISCONTINUED | OUTPATIENT
Start: 2018-04-27 | End: 2018-04-30 | Stop reason: HOSPADM

## 2018-04-27 RX ORDER — ONDANSETRON 2 MG/ML
4 INJECTION INTRAMUSCULAR; INTRAVENOUS EVERY 8 HOURS PRN
Status: DISCONTINUED | OUTPATIENT
Start: 2018-04-27 | End: 2018-04-29

## 2018-04-27 RX ORDER — MIDAZOLAM HYDROCHLORIDE 1 MG/ML
INJECTION INTRAMUSCULAR; INTRAVENOUS AS NEEDED
Status: DISCONTINUED | OUTPATIENT
Start: 2018-04-27 | End: 2018-04-27 | Stop reason: SURG

## 2018-04-27 RX ORDER — FENTANYL CITRATE/PF 50 MCG/ML
25 SYRINGE (ML) INJECTION
Status: DISCONTINUED | OUTPATIENT
Start: 2018-04-27 | End: 2018-04-27 | Stop reason: HOSPADM

## 2018-04-27 RX ORDER — OXYCODONE HYDROCHLORIDE 5 MG/1
5 TABLET ORAL EVERY 4 HOURS PRN
Status: DISCONTINUED | OUTPATIENT
Start: 2018-04-27 | End: 2018-04-30 | Stop reason: HOSPADM

## 2018-04-27 RX ORDER — SODIUM CHLORIDE 9 MG/ML
125 INJECTION, SOLUTION INTRAVENOUS CONTINUOUS
Status: DISCONTINUED | OUTPATIENT
Start: 2018-04-27 | End: 2018-04-28

## 2018-04-27 RX ORDER — ONDANSETRON 2 MG/ML
INJECTION INTRAMUSCULAR; INTRAVENOUS AS NEEDED
Status: DISCONTINUED | OUTPATIENT
Start: 2018-04-27 | End: 2018-04-27 | Stop reason: SURG

## 2018-04-27 RX ORDER — ATORVASTATIN CALCIUM 20 MG/1
20 TABLET, FILM COATED ORAL
Status: DISCONTINUED | OUTPATIENT
Start: 2018-04-27 | End: 2018-04-30 | Stop reason: HOSPADM

## 2018-04-27 RX ORDER — ACETAMINOPHEN 325 MG/1
975 TABLET ORAL ONCE
Status: DISCONTINUED | OUTPATIENT
Start: 2018-04-27 | End: 2018-04-29

## 2018-04-27 RX ORDER — METHOCARBAMOL 500 MG/1
500 TABLET, FILM COATED ORAL EVERY 6 HOURS PRN
Status: DISCONTINUED | OUTPATIENT
Start: 2018-04-27 | End: 2018-04-30 | Stop reason: HOSPADM

## 2018-04-27 RX ORDER — HYDROCHLOROTHIAZIDE 25 MG/1
25 TABLET ORAL DAILY
Status: DISCONTINUED | OUTPATIENT
Start: 2018-04-28 | End: 2018-04-28

## 2018-04-27 RX ORDER — LEVETIRACETAM 500 MG/1
500 TABLET ORAL 2 TIMES DAILY
Status: DISCONTINUED | OUTPATIENT
Start: 2018-04-27 | End: 2018-04-30 | Stop reason: HOSPADM

## 2018-04-27 RX ORDER — BISACODYL 10 MG
10 SUPPOSITORY, RECTAL RECTAL DAILY PRN
Status: DISCONTINUED | OUTPATIENT
Start: 2018-04-27 | End: 2018-04-30 | Stop reason: HOSPADM

## 2018-04-27 RX ORDER — ZOLPIDEM TARTRATE 5 MG/1
5 TABLET ORAL
Status: DISCONTINUED | OUTPATIENT
Start: 2018-04-27 | End: 2018-04-27

## 2018-04-27 RX ORDER — WARFARIN SODIUM 5 MG/1
5 TABLET ORAL
Status: COMPLETED | OUTPATIENT
Start: 2018-04-27 | End: 2018-04-27

## 2018-04-27 RX ORDER — ACETAMINOPHEN 325 MG/1
650 TABLET ORAL EVERY 6 HOURS PRN
Status: DISCONTINUED | OUTPATIENT
Start: 2018-04-27 | End: 2018-04-30 | Stop reason: HOSPADM

## 2018-04-27 RX ORDER — LEVOTHYROXINE SODIUM 0.07 MG/1
75 TABLET ORAL
Status: DISCONTINUED | OUTPATIENT
Start: 2018-04-28 | End: 2018-04-30 | Stop reason: HOSPADM

## 2018-04-27 RX ORDER — NALOXONE HYDROCHLORIDE 0.4 MG/ML
0.04 INJECTION, SOLUTION INTRAMUSCULAR; INTRAVENOUS; SUBCUTANEOUS
Status: DISCONTINUED | OUTPATIENT
Start: 2018-04-27 | End: 2018-04-30 | Stop reason: HOSPADM

## 2018-04-27 RX ORDER — SODIUM CHLORIDE, SODIUM LACTATE, POTASSIUM CHLORIDE, CALCIUM CHLORIDE 600; 310; 30; 20 MG/100ML; MG/100ML; MG/100ML; MG/100ML
75 INJECTION, SOLUTION INTRAVENOUS CONTINUOUS
Status: DISCONTINUED | OUTPATIENT
Start: 2018-04-27 | End: 2018-04-29

## 2018-04-27 RX ORDER — METOCLOPRAMIDE HYDROCHLORIDE 5 MG/ML
10 INJECTION INTRAMUSCULAR; INTRAVENOUS ONCE AS NEEDED
Status: DISCONTINUED | OUTPATIENT
Start: 2018-04-27 | End: 2018-04-30 | Stop reason: HOSPADM

## 2018-04-27 RX ORDER — KETOROLAC TROMETHAMINE 30 MG/ML
15 INJECTION, SOLUTION INTRAMUSCULAR; INTRAVENOUS EVERY 6 HOURS PRN
Status: DISCONTINUED | OUTPATIENT
Start: 2018-04-27 | End: 2018-04-29

## 2018-04-27 RX ORDER — CALCIUM CARBONATE 200(500)MG
1000 TABLET,CHEWABLE ORAL DAILY PRN
Status: DISCONTINUED | OUTPATIENT
Start: 2018-04-27 | End: 2018-04-30 | Stop reason: HOSPADM

## 2018-04-27 RX ORDER — PROPOFOL 10 MG/ML
INJECTION, EMULSION INTRAVENOUS CONTINUOUS PRN
Status: DISCONTINUED | OUTPATIENT
Start: 2018-04-27 | End: 2018-04-27 | Stop reason: SURG

## 2018-04-27 RX ORDER — MORPHINE SULFATE 2 MG/ML
2 INJECTION, SOLUTION INTRAMUSCULAR; INTRAVENOUS EVERY 2 HOUR PRN
Status: DISCONTINUED | OUTPATIENT
Start: 2018-04-27 | End: 2018-04-30 | Stop reason: HOSPADM

## 2018-04-27 RX ORDER — TRAMADOL HYDROCHLORIDE 50 MG/1
50 TABLET ORAL EVERY 6 HOURS PRN
Status: DISCONTINUED | OUTPATIENT
Start: 2018-04-27 | End: 2018-04-29

## 2018-04-27 RX ORDER — OXYCODONE HYDROCHLORIDE 10 MG/1
10 TABLET ORAL EVERY 4 HOURS PRN
Status: DISCONTINUED | OUTPATIENT
Start: 2018-04-27 | End: 2018-04-29

## 2018-04-27 RX ORDER — TRANEXAMIC ACID 100 MG/ML
INJECTION, SOLUTION INTRAVENOUS AS NEEDED
Status: DISCONTINUED | OUTPATIENT
Start: 2018-04-27 | End: 2018-04-27 | Stop reason: HOSPADM

## 2018-04-27 RX ORDER — FENTANYL CITRATE 50 UG/ML
INJECTION, SOLUTION INTRAMUSCULAR; INTRAVENOUS AS NEEDED
Status: DISCONTINUED | OUTPATIENT
Start: 2018-04-27 | End: 2018-04-27 | Stop reason: SURG

## 2018-04-27 RX ORDER — CELECOXIB 200 MG/1
200 CAPSULE ORAL DAILY
Refills: 0
Start: 2018-04-28 | End: 2019-10-09 | Stop reason: HOSPADM

## 2018-04-27 RX ADMIN — FENTANYL CITRATE 100 MCG: 50 INJECTION, SOLUTION INTRAMUSCULAR; INTRAVENOUS at 10:50

## 2018-04-27 RX ADMIN — ONDANSETRON HYDROCHLORIDE 4 MG: 2 INJECTION, SOLUTION INTRAVENOUS at 11:00

## 2018-04-27 RX ADMIN — KETOROLAC TROMETHAMINE 15 MG: 30 INJECTION, SOLUTION INTRAMUSCULAR at 21:16

## 2018-04-27 RX ADMIN — DOCUSATE SODIUM 100 MG: 100 CAPSULE, LIQUID FILLED ORAL at 17:21

## 2018-04-27 RX ADMIN — SODIUM CHLORIDE 125 ML/HR: 0.9 INJECTION, SOLUTION INTRAVENOUS at 10:45

## 2018-04-27 RX ADMIN — PROPOFOL 70 MCG/KG/MIN: 10 INJECTION, EMULSION INTRAVENOUS at 11:06

## 2018-04-27 RX ADMIN — ATORVASTATIN CALCIUM 20 MG: 20 TABLET, FILM COATED ORAL at 19:38

## 2018-04-27 RX ADMIN — TETRACAINE HCL 1.2 ML: 10 INJECTION SUBARACHNOID at 11:01

## 2018-04-27 RX ADMIN — Medication 1 TABLET: at 17:21

## 2018-04-27 RX ADMIN — INSULIN LISPRO 12 UNITS: 100 INJECTION, SOLUTION INTRAVENOUS; SUBCUTANEOUS at 21:19

## 2018-04-27 RX ADMIN — EPHEDRINE SULFATE 10 MG: 50 INJECTION, SOLUTION INTRAMUSCULAR; INTRAVENOUS; SUBCUTANEOUS at 11:20

## 2018-04-27 RX ADMIN — TRANEXAMIC ACID 1 G: 100 INJECTION, SOLUTION INTRAVENOUS at 11:20

## 2018-04-27 RX ADMIN — CEFAZOLIN SODIUM 1000 MG: 10 INJECTION, POWDER, FOR SOLUTION INTRAVENOUS at 19:41

## 2018-04-27 RX ADMIN — Medication 2000 MG: at 11:00

## 2018-04-27 RX ADMIN — OXYCODONE HYDROCHLORIDE 5 MG: 5 TABLET ORAL at 19:41

## 2018-04-27 RX ADMIN — INSULIN LISPRO 12 UNITS: 100 INJECTION, SOLUTION INTRAVENOUS; SUBCUTANEOUS at 22:38

## 2018-04-27 RX ADMIN — FLUOXETINE 20 MG: 20 CAPSULE ORAL at 22:10

## 2018-04-27 RX ADMIN — WARFARIN SODIUM 5 MG: 5 TABLET ORAL at 17:22

## 2018-04-27 RX ADMIN — SODIUM CHLORIDE: 0.9 INJECTION, SOLUTION INTRAVENOUS at 12:00

## 2018-04-27 RX ADMIN — SODIUM CHLORIDE 125 ML/HR: 0.9 INJECTION, SOLUTION INTRAVENOUS at 08:52

## 2018-04-27 RX ADMIN — MIDAZOLAM 2 MG: 1 INJECTION INTRAMUSCULAR; INTRAVENOUS at 10:50

## 2018-04-27 RX ADMIN — CELECOXIB 200 MG: 200 CAPSULE ORAL at 17:22

## 2018-04-27 RX ADMIN — SODIUM CHLORIDE, SODIUM LACTATE, POTASSIUM CHLORIDE, AND CALCIUM CHLORIDE 100 ML/HR: .6; .31; .03; .02 INJECTION, SOLUTION INTRAVENOUS at 13:23

## 2018-04-27 RX ADMIN — INSULIN GLARGINE 10 UNITS: 100 INJECTION, SOLUTION SUBCUTANEOUS at 22:09

## 2018-04-27 RX ADMIN — EPHEDRINE SULFATE 5 MG: 50 INJECTION, SOLUTION INTRAMUSCULAR; INTRAVENOUS; SUBCUTANEOUS at 11:23

## 2018-04-27 RX ADMIN — SODIUM CHLORIDE, SODIUM LACTATE, POTASSIUM CHLORIDE, AND CALCIUM CHLORIDE 100 ML/HR: .6; .31; .03; .02 INJECTION, SOLUTION INTRAVENOUS at 19:42

## 2018-04-27 RX ADMIN — DEXAMETHASONE SODIUM PHOSPHATE 4 MG: 4 INJECTION, SOLUTION INTRAMUSCULAR; INTRAVENOUS at 11:00

## 2018-04-27 RX ADMIN — LEVETIRACETAM 500 MG: 500 TABLET ORAL at 19:39

## 2018-04-27 NOTE — OCCUPATIONAL THERAPY NOTE
[address] Evaluation     Patient Name: Fabi Khan  YLROU'V Date: 4/27/2018  Problem List  Patient Active Problem List   Diagnosis    History of stroke    [facility]    Hypertension    Hypothyroidism    Aneurysm of basilar artery (HCC)    Seizure ([facility] )    PFO (patent foramen ovale)    Pre-operative clearance     Past Medical History  Past Medical History:   Diagnosis Date    Aneurysm of basilar artery ([facility] ) 07/2015    Arthritis     Cardiac arrest ([place] [address] ) 2014    Complex partial seizure with impairment of consciousness at onset [facility]) 07/2017    Dependent on wheelchair     "at times"    Diabetes mellitus ([place] [address] )     IDDM    Disease of thyroid gland     Hyperlipidemia     Hypertension     Left hip pain     Left knee pain     Muscle weakness     right sided weakness    Risk for falls     Seizures ([place] [address] )     "pt reports none recent"    Sleep apnea     "doesn't use machine"    Stenosis of middle cerebral artery 07/2015    Stroke ([place] [address] )     B BG, R frontal, L parietal, L pontine    Unsteady gait     Uses walker      Past Surgical History  Past Surgical History:   Procedure Laterality Date    ABDOMINAL SURGERY      benign mass per pt    DILATION AND CURETTAGE OF UTERUS      HYSTERECTOMY      TONSILLECTOMY      WISDOM TOOTH EXTRACTION             04/27/18 1728   Note Type   Note type Eval/Treat   Restrictions/Precautions   Weight Bearing Precautions Per Order Yes   LLE Weight Bearing Per Order WBAT   Other Precautions Fall Risk;Multiple lines;Cognitive; Bed Alarm;Telemetry   Pain Assessment   Pain Assessment No/denies pain   Pain Score No Pain   Home Living   Type of Home House   Home Layout Two level;Stairs to enter with rails  (2-3STE w/ b/l Rails; bathrooms on both levels)   Bathroom Shower/Tub Walk-in shower   Bathroom Toilet Standard   Bathroom Equipment Grab bars in shower; [address] Walker;Cane;Long-handled shoehorn;Sock aid;Reacher  (spouse has [facility])   Additional Comments pt will have a first floor setup; PTA pt reports crawling up stairs and scooting on bottom down stairs   Prior Function   Level of [place] Independent with ADLs and functional mobility  (RW)   Lives With Spouse   Receives Help From Family   ADL Assistance Independent   IADLs Needs assistance  (spouse completes most tasks)   Falls in the last 6 months 0   Vocational Retired   Comments pt spouse drives and home 81/9 and able to assist   Lifestyle   Autonomy per pt independent w/ ADLs, independent w/ functional transfers and mobility w/ RW, assists w/ light meal prep   Reciprocal Relationships spouse   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    Intrinsic Gratification reading magazines and crossword puzzles   ADL   Where [address] 5  Supervision/Setup   Grooming Assistance 5  Supervision/Setup   [address] 5  Supervision/Setup   LB [address] 3  Moderate Assistance   [address] 5  Supervision/Setup    [facility] 2  Maximal [address]  3  Moderate Assistance   Bed Mobility   Supine to Sit 4  Minimal assistance   Additional items Assist x 1; Increased time required;Verbal cues;LE management; Bedrails;HOB elevated   Additional Comments VCs to maintain THPS and for safety; supine BP: 127/62, EOB BP:119/61   Transfers   Sit to Stand 4  Minimal assistance   Additional items Assist x 1; Increased time required;Verbal cues;Armrests   Stand to Sit 4  Minimal assistance   Additional items Assist x 1; Increased time required;Verbal cues;Armrests   Additional Comments VCs for UE positioning and safety and adhering to THPs; BP standin/60, and seated end of session 107/78   Functional Mobility   Functional Mobility 4  Minimal assistance   Additional Comments assist x1 w/ unsteadiness and safety w/ RW   Additional items Rolling walker Balance   Static Sitting Fair +   Dynamic Sitting Fair   Static Standing Fair -   Dynamic Standing Poor +   Activity Tolerance   Activity Tolerance Treatment limited secondary to medical complications (Comment)   Nurse Made Aware appropriate to see per Crystal CORNEJO Rom Assessment   RUE Assessment WFL  (4-/5)   LUE Assessment   LUE Assessment WFL  (4-/5)   Hand Function   Gross Motor Coordination Functional   Fine Motor Coordination Functional   Sensation   Light Touch No apparent deficits   Sharp/Dull No apparent deficits   Proprioception   Proprioception No apparent deficits   Vision-Basic Assessment   Current Vision No visual deficits   Vision - Complex Assessment   Ocular Range of Motion WFL   Acuity Able to read clock/calendar on wall without difficulty   Perception   Inattention/Neglect Appears intact   Cognition   Overall Cognitive Status Impaired   Arousal/Participation Alert; Cooperative   Attention Attends with cues to redirect   Orientation Level Oriented to place;Oriented to person;Oriented to time;Oriented to situation   Memory Decreased short term memory;Decreased recall of precautions   Following Commands Follows one step commands with increased time or repetition   Comments pt w/ decreased recall of precautions; requires cues to adhere to THPS t/o session; pt crossing LEs t/o    Assessment   Limitation Decreased ADL status; Decreased UE strength;Decreased Safe judgement during ADL;Decreased endurance;Decreased cognition;Decreased self-care trans;Decreased high-level ADLs   Prognosis Good   Assessment Pt is a 80 y o  female seen for OT evaluation s/p admit to [address] on 4/27/2018 w/ s/p L THR, posterior approach w/ total hip precautions and pt is WBAT L LE  Comorbidities affecting pt's functional performance at time of assessment include: h/o stroke (R frontal, L parietal), DM, HTN, arthritis, anxiety, depression, h/o seizures   Personal factors affecting pt at time of IE include: decreased adherence to THPS  Prior to admission, pt was living w/ spouse and reports can have a first floor setup at home  Pt reports independent w/ ADLs, independent functional transfers and mobility w/ RW, reports crawling up stairs and scooting on bottom downstairs, spouse drives and mainly completes IADLs  Upon evaluation: Pt requires MIN assist supine>sit bed mobility w/ HOB elevated and cues to adhere to THPs, MIN assist sit<>stand from bed w/ VCs for UE positioning and operated LE positioning, MIN assist functional mobility w/ RW, MOD-MAX assist LB ADLS, setup UB ADLs 2* the following deficits impacting occupational performance: decreased strength and endurance, impaired balance, impaired activity tolerance, increased pain, impaired safety awareness, decreased adherence to THPS (pillow positioned between LEs while seated in chair  BP: 127/62 supine, /61, standin/60 and seated in chair 107/78 w/ no signs of dizziness or lightheadedness  Pt provided w/ THR packet and education on precautions and LHAE; requires continued education and training  Pt to benefit from continued skilled OT tx while in the hospital to address deficits as defined above and maximize level of functional independence w ADL's and functional mobility  Occupational Performance areas to address include: grooming, bathing/shower, toilet hygiene, dressing, functional mobility and clothing management, Ze [place] training, home safety education  From OT standpoint, recommendation at time of d/c would be home w/ family support and home OT and BSC  Goals   Patient Goals go home   LTG Time Frame 3-7   Long Term Goal please see below goals   Plan   Treatment Interventions ADL retraining;Functional transfer training;UE strengthening/ROM; Endurance training;Cognitive reorientation;Patient/family training;Equipment evaluation/education; Compensatory technique education; Activityengagement; Energy conservation   Goal Expiration Date 18   OT Frequency 3-5x/wk   Recommendation   OT Discharge Recommendation Home OT  (family support)   Equipment Recommended Bedside commode   OT - OK to Discharge (when medically stable)   Barthel Index   Feeding 10   Bathing 0   Grooming Score 5   Dressing Score 5   Bladder Score 0   Bowels Score 10   Toilet Use Score 5   Transfers (Bed/Chair) Score 10   Mobility (Level Surface) Score 0   Stairs Score 0   Barthel Index Score 45   Modified Lares Scale   Modified Lares Scale 4     Occupational Therapy Goals to be met in 3-7 days:  1) Pt will improve activity tolerance to G for min 30 min txment sessions  2) Pt will complete ADLs/self care w/ mod I w/ LHAE  3) Pt will complete toileting w/ mod I w/ G hygiene/thoroughness using DME PRN  4) Pt will improve functional transfers on/off all surfaces using DME PRN w/ G balance/safety including toileting w/ mod I  5) Pt will improve fx'l mobility during I/ADl/leisure tasks using DME PRN w/ g balance/safety w/ mod I  6) Pt will engage in ongoing cognitive assessment w/ G participation to A w/ safe d/c planning/recommendations  7) Pt will demonstrate G carryover of pt/caregiver education and training and home safety education as appropriate w/ mod I  w/ G tolerance  8) Pt will demonstrate improved bed mobility to MOD I  9) Pt will demonstrate 100% carryover of LHAE for LB ADLs/self care and leisure s/p skilled education w/ mod I and G participation  10) Pt will demonstrate improved standing tolerance to 3-5 minutes during functional tasks w/ no LOB to enhance ADL performance  11) Pt will recall and demonstrate 100% compliance to THPS t/o OT sessions       Documentation completed by: Piedad Walton MS, OTR/L

## 2018-04-27 NOTE — DISCHARGE INSTRUCTIONS
[address] Operative Joint Replacement Instructions    FAITH Andrade  Office Phone [phone]      MEDICATIONS  ¨no  Plavix: Patients on Plavix will resume their standard dose while in the hospital    ¨no Enteric Coated Aspirin 325 mg: Take twice daily until your first post operative visit  ¨ Continue as you were in the hospital/rehab facility  ¨ yes Coumadin: If we have started you on this for blood thinning, you will be tested on Mondays and Thursdays  The home health nurse will draw your blood  Our office will select the dosing instructions for you until your next blood draw  If you were on the medication before the surgery, the physician who manages your Coumadin will resume the care of that medication  On the day you have your blood test drawn, do not take your Coumadin dose until contacted by the office  If you have not heard by 4 P  M , please take the dose you took the day before and call the managing doctor's office for instructions in the morning  ¨ Senokot: This is our recommended stool softener  Please use this medication to help prevent constipation that can arise from iron and pain medication use  It is also advisable to increase your fluid intake and fiber in your diet during your jossue and pain medication therapy  ¨ Celebrex: Unless there is a contraindication, you were given a prescription for Celebrex prior to your surgery  Please continue to take daily until you finish the prescription  ¨ Pain Medications: Your prescriptions may run out before you return for your next visit  Please give us two regular office day's notice before you run out, to prevent any problems of not having pain medicine  Be advised that prescriptions for Oxycodone and Vicodin cannot be phoned to your pharmacy  They will need to be picked up at our office  Please refrain from using alcohol with your pain medicines  You may also include Tylenol for pain   You should not exceed 3000 mg of Tylenol in a day  Please be careful to not overdose the Tylenol  ¨ Arthritis/Anti-inflammatory: If you were taking an arthiritis medicine prior to your surgery, please wait until you have stopped the injection blood thinning medicine to resume taking it  ¨ Herbal Medicines: Please hold all these preparations until after your first post-operative visit  ACTIVITY  ¨ Your weight bearing status is: AS TOLERATED  ¨ If you have been furnished with an assistive device  Please use it until your therapist makes a change  ¨ You may participate in activity as tolerated  It is likely that you will tire more easily for a time after surgery  Please rest when you need it to help your healing process  ¨ Stairs are not restricted for you  Please climb stairs as instructed by your physical therapist   Zaheer Grant For hip and knee replacement patients please elevate your leg or legs while resting  This is important to prevent lower leg swelling  ¨ When you are back at home you will likely have physical therapy three times a week  On the days you do not have formal therapy please perform the home exercises given to you  ¨ If you had a hip replacement, please follow the safety precautions given to you by the nurse or therapist taking care of you  ¨Immediately following the surgery you are not permitted to drive until cleared by your surgeon  This typically does not happen until your first visit after surgery or later  ¨Knee replacement patients may be passengers in a vehicle following their discharge from the hospital   ¨Hip replacement patients are not allowed in a vehicle, except for your trip home and your first follow up visist  Please follow these guidelines unless specifically instructed to start outpatient therapy at an earlier time  ¨ Please do not perform lifting tasks or strenuous domestic activities  ¨ If you currently are employed, you are off work until cleared by your surgeon   Everyone's ability to return to work is determined by his or her abilities  Your return to work may take a few weeks to a few months  ¨ Sexual activities are permitted as tolerated  ¨Hip replacement patients with standard protocol may ride home in a car from the hospital and first ride to office for post-op appointment  NORMAL FINDINGS  Numbness along the incision   Mechanical click of a knee replacement  Your incision area will feel warm  WOUND CARE  ¨ If you have a tegaderm dressing, you should maintain it for 5-7 days post operatively then remove it, or it will be removed by the home nurses  ¨ It is OK to shower with the tegaderm dressing on  Please do not submerge the incision into a pool, bath or hot tub until after your 1st post-operative visit  ¨ Please do not disturb your staples or the scabs  It promotes better healing and smaller scars  ¨ Cover the incision with gauze dressing until there is no further drainage  ¨ You may leave the incision open to the air after removing your dressing  ¨ Please be generous with the use of ice  It is a very good remedy  Apply ice for only twenty minutes at a time  You may use it every hour  It is recommended to ice before and after anticipated activities, which includes exercise and physical therapy  ¨ Wear your knee-high pressure stockings every day  They may be removed for sleep at night  Continue to wear them until you are seen for your first follow up  ¨ Your staples will be removed about two weeks after your surgery date  Home nurses and physical therapists typically remove them  If they are unable to, please call our office to have us do it for you  FOLLOW UP  ¨ You should have a scheduled visit with your surgeon scheduled for three to four weeks after surgery  If you do not remember your appointment date and time, or if you are sure you do not have one, please call to set one up    ¨ Please inform the office if you experience one of the following:  Fever that is not responding to Tylenol and is above 101 degrees   Redness of the skin that is increasing in area   Your incision is soaking the dressings with drainage or blood   You're unable to bear weight on your operative leg or legs

## 2018-04-27 NOTE — PLAN OF CARE
Problem: Potential for Falls  Goal: Patient will remain free of falls  INTERVENTIONS:  - Assess patient frequently for physical needs  -  Identify cognitive and physical deficits and behaviors that affect risk of falls    -  [place] fall precautions as indicated by assessment   - Educate patient/family on patient safety including physical limitations  - Instruct patient to call for assistance with activity based on assessment  - Modify environment to reduce risk of injury  - Consider OT/PT consult to assist with strengthening/mobility   Outcome: Progressing

## 2018-04-27 NOTE — PLAN OF CARE
Problem: OCCUPATIONAL THERAPY ADULT  Goal: Performs self-care activities at highest level of function for planned discharge setting  See evaluation for individualized goals  Treatment Interventions: ADL retraining, Functional transfer training, UE strengthening/ROM, Endurance training, Cognitive reorientation, Patient/family training, Equipment evaluation/education, Compensatory technique education, Activityengagement, Energy conservation  Equipment Recommended: Bedside commode       See flowsheet documentation for full assessment, interventions and recommendations  Limitation: Decreased ADL status, Decreased UE strength, Decreased Safe judgement during ADL, Decreased endurance, Decreased cognition, Decreased self-care trans, Decreased high-level ADLs  Prognosis: Good  Assessment: Pt is a 80 y o  female seen for OT evaluation s/p admit to [address]  on 4/27/2018 w/ s/p L THR, posterior approach w/ total hip precautions and pt is WBAT L LE  Comorbidities affecting pt's functional performance at time of assessment include: h/o stroke (R frontal, L parietal), DM, HTN, arthritis, anxiety, depression, h/o seizures  Personal factors affecting pt at time of IE include: decreased adherence to THPS  Prior to admission, pt was living w/ spouse and reports can have a first floor setup at home  Pt reports independent w/ ADLs, independent functional transfers and mobility w/ RW, reports crawling up stairs and scooting on bottom downstairs, spouse drives and mainly completes IADLs   Upon evaluation: Pt requires MIN assist supine>sit bed mobility w/ HOB elevated and cues to adhere to THPs, MIN assist sit<>stand from bed w/ VCs for UE positioning and operated LE positioning, MIN assist functional mobility w/ RW, MOD-MAX assist LB ADLS, setup UB ADLs 2* the following deficits impacting occupational performance: decreased strength and endurance, impaired balance, impaired activity tolerance, increased pain, impaired safety awareness, decreased adherence to THPS (pillow positioned between LEs while seated in chair  BP: 127/62 supine, /61, standin/60 and seated in chair 107/78 w/ no signs of dizziness or lightheadedness  Pt provided w/ THR packet and education on precautions and LHAE; requires continued education and training  Pt to benefit from continued skilled OT tx while in the hospital to address deficits as defined above and maximize level of functional independence w ADL's and functional mobility  Occupational Performance areas to address include: grooming, bathing/shower, toilet hygiene, dressing, functional mobility and clothing management, [address] [place] [address] training, home safety education  From OT standpoint, recommendation at time of d/c would be home w/ family support and home OT and BSC         OT Discharge Recommendation: Home OT (family support)  OT - OK to Discharge:  (when medically stable)      Comments: Cristobal Dubois MS, OTR/L

## 2018-04-27 NOTE — ANESTHESIA PROCEDURE NOTES
Spinal Block    Patient location during procedure: OR  Start time: 4/27/2018 10:55 AM  End time: 4/27/2018 11:01 AM  Reason for block: procedure for pain and at surgeon's request  Staffing  Anesthesiologist: STEVEN GARAY  Resident/CRNA: Verenice Aguilar  Performed: anesthesiologist   Preanesthetic Checklist  Completed: patient identified, site marked, surgical consent, pre-op evaluation, timeout performed, IV checked, risks and benefits discussed and monitors and equipment checked  Spinal Block  Patient position: sitting  Prep: ChloraPrep  Patient monitoring: cardiac monitor and frequent blood pressure checks  Approach: midline  Location: L3-4  Injection technique: single-shot  Needle  Needle type: pencil-tip   Needle gauge: 25 G  Needle length: 5 cm  Assessment  Sensory level: T4  Injection Assessment:  negative aspiration for heme, no paresthesia on injection and positive aspiration for clear CSF    Post-procedure:  adhesive bandage applied, pressure dressing applied, secured with tape, site cleaned and sterile dressing applied

## 2018-04-27 NOTE — PLAN OF CARE
Problem: PHYSICAL THERAPY ADULT  Goal: Performs mobility at highest level of function for planned discharge setting  See evaluation for individualized goals  Treatment/Interventions: Functional transfer training, LE strengthening/ROM, Elevations, Therapeutic exercise, Endurance training, Patient/family training, Bed mobility, Gait training, Spoke to nursing, OT, Family  Equipment Recommended: Mendy Kaur, Other (Comment) (RW & BSC; pt has RW )       See flowsheet documentation for full assessment, interventions and recommendations  Outcome: Progressing  Prognosis: Good  Problem List: Decreased strength, Decreased range of motion, Decreased endurance, Impaired balance, Decreased mobility, Decreased safety awareness, Pain, Orthopedic restrictions  Assessment: Pt  80 y  o female s/p L THR 2* to severe DJD  Pt referred to PT for mobility assessment & D/C planning  PTA, pt reports being I w/ rollator  On eval, pt demonstrate dec mobility, balance, endurance & amb 2* to LLE weakness  Pt require minAx1 for most functional mobility + cues for techniques  Gait deviations as above but no gross LOB noted  Pt require cues for RW mgt during amb & during chair approach  Pt instructed on THPs, pt verbalized understanding but require cues to maintain THP's during mobility  Pt can be impulsive at times but can be redirected  No dizziness reported t/o session  Pt tolerated OOB in chair at end of session w/o issues  Call bell in reach  Will continue skilled PT to improve function & safety  Pt plans to return home at D/C  At this time, will anticipate good progress in PT for safe D/C to home  Will recommend HHPT, BSC & family support at D/C  Pt will need to achieve PT goals prior to D/C for safe return to home  CM to follow  Nsg staff to continue to mobilized pt (OOB in chair for all meals & ambulate in room/unit) as tolerated to prevent further decline in function  Nsg notified           Recommendation: Home PT, 24 hour supervision/assist     PT - OK to Discharge: No (pt to achieve PT goals prior to D/C home)    See flowsheet documentation for full assessment

## 2018-04-27 NOTE — PHYSICAL THERAPY NOTE
PT EVALUATION    Pt  Name: Shaniqua Sosa  Pt  Age: 80 y o  Patient Active Problem List   Diagnosis    History of stroke    Insulin dependent diabetes mellitus ([place] [address] )    Hypertension    Hypothyroidism    Aneurysm of basilar artery ([facility] )    Seizure ([place] [address] )    PFO (patent foramen ovale)    Pre-operative clearance       Primary osteoarthritis of left hip [M16 12]    Past Medical History:   Diagnosis Date    Aneurysm of basilar artery ([place] [address] ) 07/2015    Arthritis     Cardiac arrest ([place] [address] ) 2014    Complex partial seizure with impairment of consciousness at onset [facility]) 07/2017    Dependent on wheelchair     "at times"    Diabetes mellitus ([place] [address] )     IDDM    Disease of thyroid gland     Hyperlipidemia     Hypertension     Left hip pain     Left knee pain     Muscle weakness     right sided weakness    Risk for falls     Seizures ([place] [address] )     "pt reports none recent"    Sleep apnea     "doesn't use machine"    Stenosis of middle cerebral artery 07/2015    Stroke ([place] [address] )     B BG, R frontal, L parietal, L pontine    Unsteady gait     Uses walker        Past Surgical History:   Procedure Laterality Date    ABDOMINAL SURGERY      benign mass per pt    DILATION AND CURETTAGE OF UTERUS      HYSTERECTOMY      TONSILLECTOMY      WISDOM TOOTH EXTRACTION          04/27/18 7331   Note Type   Note type Eval only   Pain Assessment   Pain Assessment No/denies pain   Pain Score No Pain   Home Living   Type of [address] [place] [address] Two level; Able to live on main level with bedroom/bathroom;Stairs to enter with rails  (2STE w/ B/L railing)   Home Equipment Walker;Cane;Reacher;Sock aid;Grab bars   Additional Comments pt will have a first floor setup; PTA pt reports crawling up stairs and scooting on bottom down stairs   Prior Function   Level of [place] Independent with ADLs and functional mobility  (w/ RW)   Lives With Spouse   Receives Help From Family   ADL Assistance Independent   IADLs Needs assistance   Falls in the last 6 months 0   Comments pt spouse drives and home 97/3 and able to assist   Restrictions/Precautions   LLE Weight Bearing Per Order WBAT   Other Precautions Fall Risk;Multiple lines;THR; Impulsive   General   Family/Caregiver Present Yes   Cognition   Overall Cognitive Status Impaired   Arousal/Participation Alert   Attention Attends with cues to redirect   Orientation Level Oriented to person;Oriented to place;Oriented to time   Following Commands Follows one step commands with increased time or repetition   RUE Assessment   RUE Assessment WFL  (refer to OT eval for details)   LUE Assessment   LUE Assessment WFL  (refer to OT eval for details)   RLE Assessment   RLE Assessment WNL   Strength RLE   RLE Overall Strength 4+/5   LLE Assessment   LLE Assessment X   Strength LLE   LLE Overall Strength 3+/5   L Hip Flexion 3-/5  (limited to THPs)   Coordination   Movements are Fluid and Coordinated 1   Sensation WFL   Bed Mobility   Supine to Sit 4  Minimal assistance   Additional items Assist x 1;HOB elevated; Bedrails; Increased time required;Verbal cues;LE management   Additional Comments cues for techniques & maintain THPs   Transfers   Sit to Stand 4  Minimal assistance   Additional items Assist x 1; Increased time required;Verbal cues   Stand to Sit 4  Minimal assistance   Additional items Assist x 1; Increased time required;Verbal cues;Armrests   Additional Comments cues for techniques   Ambulation/Elevation   Gait pattern Antalgic;Decreased foot clearance;Decreased L stance; Short stride; Step to; Inconsistent jose  (require cues to dec pace & cues for proper RW mgt)   Gait Assistance 4  Minimal assist   Additional items Assist x 1;Verbal cues; Tactile cues   Assistive Device Rolling walker   Distance 10'x1   Balance   Static Sitting Fair +   Static Standing Fair -   Ambulatory Poor +   Activity Tolerance   Activity Tolerance Patient tolerated treatment well   Nurse Made Aware arabella Assessment   Prognosis Good   Problem List Decreased strength;Decreased range of motion;Decreased endurance; Impaired balance;Decreased mobility; Decreased safety awareness;Pain;Orthopedic restrictions   Assessment Pt  80 y  o female s/p L THR 2* to severe DJD  Pt referred to PT for mobility assessment & D/C planning  PTA, pt reports being I w/ rollator  On eval, pt demonstrate dec mobility, balance, endurance & amb 2* to LLE weakness  Pt require minAx1 for most functional mobility + cues for techniques  Gait deviations as above but no gross LOB noted  Pt require cues for RW mgt during amb & during chair approach  Pt instructed on THPs, pt verbalized understanding but require cues to maintain THP's during mobility  Pt can be impulsive at times but can be redirected  No dizziness reported t/o session  Pt tolerated OOB in chair at end of session w/o issues  Call bell in reach  Will continue skilled PT to improve function & safety  Pt plans to return home at D/C  At this time, will anticipate good progress in PT for safe D/C to home  Will recommend HHPT, BSC & family support at D/C  Pt will need to achieve PT goals prior to D/C for safe return to home  CM to follow  Nsg staff to continue to mobilized pt (OOB in chair for all meals & ambulate in room/unit) as tolerated to prevent further decline in function  Nsg notified  Goals   Patient Goals go home   STG Expiration Date 05/04/18   Short Term Goal #1 Goals to be met in 7 days; pt will be able to: 1) inc strength & balance by 1/2 grade; 2) inc functional mobility to modified I; 3) inc amb w/ RW approx  >150' w/ modified I; 4) inc barthel score to 60; 5) negotiate stairs w/ S; 6) pt/caregiver ed   Treatment Day 0   Plan   Treatment/Interventions Functional transfer training;LE strengthening/ROM; Elevations; Therapeutic exercise; Endurance training;Patient/family training;Bed mobility;Gait training;Spoke to nursing;OT;Family   PT Frequency Twice a day;7x/wk; Weekend Recommendation   Recommendation Home PT;24 hour supervision/assist   Equipment Recommended Walker; Other (Comment)  (RW & BSC; pt has RW )   PT - OK to Discharge No  (pt to achieve PT goals prior to D/C home)   Barthel Index   Feeding 10   Bathing 0   Grooming Score 5   Dressing Score 5   Bladder Score 0   Bowels Score 10   Toilet Use Score 5   Transfers (Bed/Chair) Score 10   Mobility (Level Surface) Score 0   Stairs Score 0   Barthel Index Score 45   Hx/personal factors: co-morbidities; fall risk; currently functioning below baseline; inaccessible home; lines; use of AD; pain; THPs  Examination: dec mobility, dec balance, dec endurance, dec amb, high risk for falls; pain; LLE weakness  Clinical: unpredictable (POD#0, pain mgt; fall risk)  Complexity: high    Patricia Chase, PT

## 2018-04-27 NOTE — OP NOTE
OPERATIVE REPORT  PATIENT NAME: Jayda Spain    :  1950  MRN: [id number]  Pt Location: [facility] OR ROOM 02    SURGERY DATE: 2018    Surgeon(s) and Role:     * Nikkie Arriaga PA-C - Assisting     * Charito Bermudez MD - Primary    Preop Diagnosis:  Primary osteoarthritis of left hip [M16 12]    Post-Op Diagnosis Codes:     * Primary osteoarthritis of left hip [M16 12]    Procedure(s) (LRB):  ARTHROPLASTY HIP TOTAL (Left)  Complications:   None    Procedure and Technique:       Migel Hendricks y o      cc: hip pain  DATE OF PROCEDURE: 18  PREOPERATIVE DIAGNOSIS: Severe degenerative joint disease, left hip  POSTOPERATIVE DIAGNOSES:  Same   PROCEDURE: left THR    IMPLANTS:  Vendor Depuy  Acetabulum 48 OD [place]  Liner +4 N 32 ID  Femur 1 actis stem hi offset  Head +5 32 head    ASSISTANT: Nikkie Arriaga PA-C    COMPLICATIONS: None   DRAINS: None  ESTIMATED BLOOD LOSS: 200 ml  ANESTHESIA: spinal     DESCRIPTION OF PROCEDURE: The patient was taken to the Operating Room on  the day of surgery  The patient was identified and preoperative antibiotics  were administered  After successful anesthesia, the patient was placed in  the lateral decubitus position with the surgical hip up  The patient's pelvis was  held with pelvic positioning devices  The patient was then prepped and draped in the  usual sterile fashion  Ioban drape was placed over the proposed surgical  Site  Time out was performed and site marking identified  A longitudinal incision was made over the posterior aspect of the Trochanter  and carried down to the fascia  The fascia was split longitudinally  The  gluteus xander musculature was split using Bovie electrocautery for  hemostasis  Charnley retractor was placed  The hip was brought into  extension, internal rotation, and the short external rotators were elevated  off the posterolateral aspect of the femur  Piriformis tendon was  identified and released   U-capsulotomy was performed  The hip was  dislocated  Femoral head/neck osteotomy was performed  Over-the-top  retractor was placed on the anterior wall of the acetabulum with the leg in  a flexed position  The acetabulum was identified and fully exposed  The  remnant of the labrum was resected and the cotyloid fossa was freed of soft  tissue  The posterior retractor was placed  This was free of the region of  the sciatic nerve  Sequential reaming of the acetabulum was performed  Medialization, followed by sequential reaming 1 mm below the implant size  The implant was impacted in place in a 20-degree anteverted position and  40-degree abduction angle  Liner was chosen and implanted with the locking mechanism engaged  A femoral neck retractor was placed  Any soft tissue was removed off the calcar  Lateralization was performed after use of a box osteotome  Sequential broaching of the canal was taken to fill the mediolateral plane with excellent fit against the calcar femorale  Calcar  planing was performed  This was in line with preoperative templating  Trial reduction was performed with proper neck segment and offset, and the  hip was reduced  The patient had excellent stability in all planes  including extension, external rotation  The patient was then tested in the  sleeping position, internal rotation to 60 degrees with no instability  Next, the patient was tested in neutral abduction, 90 degrees of flexion and  internal rotation to 60 degrees with no instability  The hip was  dislocated  Trials were removed  The femoral component was impacted  line-to-line with calcar planing  The appropriate ball segment was placed  on a clean, dry Freeman taper and impacted  The hip was reduced and again  found to have excellent stability in all planes  Copious amounts of saline were used intermittently through the case in order to wash out the surgical site  Meticulous hemostasis was achieved with Bovie electrocautery   The tissues were bathed with a combination of saline and tranexamic acid for postoperative blood management  A layered closure was performed  #5 Fiberwire suture was used in order to close the U-capsulotomy and the piriformis tendon to the posterolateral aspect of the femur  Injection of the capsule and muscular structures were injected with Marcaine, Toradol and epinephrine for postoperative pain management  The fascia was  closed with #1 vicryls, followed by 2-0 Vicryl in subcutaneous fashion, followed by subcuticular Monocryl derma-bond Mastisol and Steri-Strips applied at the level of the skin  Clean, sterile, nonadherent dressings were applied  The patient was then taken back to the supine position  Limb lengths restored  The patient was awakened from anesthesia and taken to the Postanesthesia Care Unit in satisfactory condition  PA-C was present from the time the patient was taken to the Operating Room until the time the patient was taken to the Postanesthesia Care Unit  They were helpful with technical aspects of the case including retraction, leg holding, assembly of instruments and jigs, and eventual closure                    I was present for the entire procedure    Patient Disposition:  PACU     SIGNATURE: Claudetta Rhodes, MD  DATE: April 27, 2018  TIME: 12:15 PM

## 2018-04-27 NOTE — PROGRESS NOTES
Dr Ashly Ambrose made aware that pt drank 2-3 oz of orange juice (no pulp) this morning at 7:00am with her meds - he will call me back

## 2018-04-27 NOTE — CONSULTS
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, left hip, failed conservative treatments  Ambulatory dysfunction  Type 2 diabetes mellitus  Obesity, BMI 29 29  Hypothyroidism  Hypertension  Obstructive sleep apnea, does not use CPAP  Basilar artery aneurysm, since 2015  History of cardiac arrest 2014 required CPR  Complex partial seizures, July 2017  Previous history of strokes, 2013, 2016     Recommendation: -    Patient seems to be recuperating well following her surgery and her pain symptoms are minimal   I reviewed with her about numeric pain scale and use of medications  Prefer minimizing narcotic use if she can tolerate nonsteroidal anti-inflammatory medications  Continue her sliding scale insulin coverage and resume her Lantus 10 units which she takes normally at bedtime  She also takes 3 units of NovoLog prior to her 3 meals and that can be continued as she has been getting from Dr Rosanne Hancock her endocrinologist   I have changed her diet to diabetic level 3  Continue her antiepileptic medications  Patient has been on Plavix with regards to her previous strokes, she has now been transitioned to warfarin from orthopedic standpoint for DVT prophylaxis  Once she completes Coumadin treatment over next 4-6 weeks she will need to return back to her usual antiplatelet therapy  I have reviewed patients medications as initiated on post operative orders by the primary team  Recommend  monitoring closely for any hypoxemia / respiratory insufficieny related to narcotics and to reduce doses and frequency of narcotics if necessary  Resume patients home medications and I have reviewed them   Maintain Intravenous Fluids for next 24 -48 hours, till patient able to reliably keep meals and meds in  Suggest  Zofran ODT 4 mg sublingually PRN for control of post operative nausea  Patient encouraged to  use Incentive spirometry q 15 minutes while awake to minimize risk of postoperative atelectasis and pneumonia  Patient verbalized to understand and fully comprehend  Recommend DVT prophylaxis with use of Venodyne compression boots  Monitor PT and INR daily, target INR should be 2 0 for therapeutic benefit  Discontinue patient's Call catheter within next 24-48 hours in order  to minimize risk of catheter associated UTI  Please check patient's hemoglobin and hematocrit within next 24 hours to ensure that there is no acute blood loss anemia which would need any blood transfusion  We will follow this pleasant patient with your service closely and recommend necessary changes based on  further hospital course and diagnostics  Thank you, Dr Fay Lan , for your kind consultation  HISTORY OF PRESENT ILLNESS:    Reason for Consult:  Post operative management following left hip replacement with multiple underlying comorbidities and risk factors    HPI: Anna Guaman is a 80 y o  female, who has suffered with chronic pain in her left hip for a long period of time  She initially was attributing her pain in her left leg to her knee but as she found later on that her hip was quite arthritic and was causing her problems  She had pain in her left leg for past year or more and it was initially not knee and then gradually started affecting her hip  She was taking pain medications which initially was helpful but as the time progressed it became ineffective  She tried physical therapy but could not tolerate ongoing pain  Simple activities were becoming increasingly hard like weight-bearing standing sitting in a position for prolonged period of time  She tried using a cane and did not help    She was referred to Orthopedic service and was noted to have 2 cm shortening on her left leg and tenderness on her left hip  With passive range of motion Dr Didi Arechiga indicates that she had severe tenderness and her imaging studies were noted to have shown severe joint narrowing in the hip joint, subchondral cysts, subchondral sclerosis, periarticular osteophytes and joint subluxation without any fracture  Avascular necrosis was noted  Patient was given options either to consider hip replacement or to proceed with ongoing conservative treatments which in fact had not helped her much  She was keen on getting her surgery done as she did not want to continue with ongoing severe symptoms which had been worsening  Review of Systems   Constitutional:   Fully internally lost 50 lb of weight for her obesity, denies chills, diaphoresis, fatigue or fever  HEENT:  Negative for congestion, sore throat and tinnitus  No visual complaints  Respiratory:  Negative cough, chest tightness, shortness of breath and wheezing  Cardiovascular:  Negative for chest pain and palpitations  She has previous history of having a cardiac arrest and she required CP R  Apparently she had a tumor in her abdomen / pelvic area and after it was removed she had some arrhythmia which probably triggered her cardiac arrest    Gastrointestinal: Negative for abdominal distention or pain, constipation, diarrhea and nausea  Endocrine: Patient is a known diabetic and has been followed by Dr Rosanne Hancock decides her metformin she takes Lantus and NovoLog 3 times a day  Genitourinary:                Negative for  dysuria, flank pain  Tolerating Call without difficulty  Skin:   Negative for color change, pallor and rash  Neurological:   Negative for dizziness, tremors, seizures, syncope, facial asymmetry, speech difficulty, weakness, light-headedness, numbness and headaches  She has previous history of strokes and takes antiepileptic medications  Hematological:  Musculoskeletal:  Psychiatric:  No h/o spontaneous bruising/bleeding  Joint pains as above  Negative for agitation, behavioral problems, confusion, decreased concentration, dysphoric mood and sleep disturbance  A complete system-based review of systems as discussed with patient is otherwise negative  PAST MEDICAL HISTORY:  Past Medical History:   Diagnosis Date    Aneurysm of basilar artery ([facility] ) 07/2015    Arthritis     Cardiac arrest ([place] [address] ) 2014    Complex partial seizure with impairment of consciousness at onset [facility]) 07/2017    Dependent on wheelchair     "at times"    Diabetes mellitus ([place] [address] )     IDDM    Disease of thyroid gland     Hyperlipidemia     Hypertension     Left hip pain     Left knee pain     Muscle weakness     right sided weakness    Risk for falls     Seizures ([place] [address] )     "pt reports none recent"    Sleep apnea     "doesn't use machine"    Stenosis of middle cerebral artery 07/2015    Stroke ([place] [address] )     B BG, R frontal, L parietal, L pontine    Unsteady gait     Uses walker      Past Surgical History:   Procedure Laterality Date    ABDOMINAL SURGERY      benign mass per pt    DILATION AND CURETTAGE OF UTERUS      HYSTERECTOMY      TONSILLECTOMY      WISDOM TOOTH EXTRACTION         FAMILY HISTORY:  Non-contributory    SOCIAL HISTORY:  History   Alcohol Use    Yes     Comment: social     History   Drug Use No     History   Smoking Status    Never Smoker   Smokeless Tobacco    Never Used       ALLERGIES:  No Known Allergies    MEDICATIONS:  All current active medications have been reviewed    Current Facility-Administered Medications   Medication Dose Route Frequency Provider Last Rate Last Dose    acetaminophen (TYLENOL) tablet 650 mg  650 mg Oral Q6H PRN Vinny Reeder PA-C        acetaminophen (TYLENOL) tablet 975 mg  975 mg Oral Once Doverchitra Jimenez DO        [START ON 4/28/2018] amLODIPine (NORVASC) tablet 10 mg  10 mg Oral Daily Alexander Prince Ghanshyam Murguia MD        atorvastatin (LIPITOR) tablet 20 mg  20 mg Oral Daily With Rizwan Rodriguez MD        bisacodyl (DULCOLAX) rectal suppository 10 mg  10 mg Rectal Daily PRN Flori Pittman PA-C        calcium carbonate (TUMS) chewable tablet 1,000 mg  1,000 mg Oral Daily PRN Flori Pittman PA-C        ceFAZolin (ANCEF) 1,000 mg in sodium chloride 0 9 % 50 mL IVPB  1,000 mg Intravenous Q8H Flori Pittman PA-C        celecoxib (CeleBREX) capsule 200 mg  200 mg Oral Daily Flori Pittman PA-C   200 mg at 04/27/18 1722    diphenhydrAMINE (BENADRYL) injection 50 mg  50 mg Intravenous Q6H PRN Flori Pittman PA-C        docusate sodium (COLACE) capsule 100 mg  100 mg Oral BID Flori Pittman PA-C   100 mg at 04/27/18 1721    [START ON 4/28/2018] fenofibrate (TRICOR) tablet 145 mg  145 mg Oral Daily Baron Hamzah MD        FLUoxetine (PROzac) capsule 20 mg  20 mg Oral HS Baron Rusk, MD  Isla Cranker ON 4/28/2018] hydrochlorothiazide (HYDRODIURIL) tablet 25 mg  25 mg Oral Daily Baron Hamzah MD        insulin glargine (LANTUS) subcutaneous injection 10 Units 0 1 mL  10 Units Subcutaneous HS Baron Hamzah MD        insulin lispro (HumaLOG) 100 units/mL subcutaneous injection 1-5 Units  1-5 Units Subcutaneous HS Baron Hamzah MD        [START ON 4/28/2018] insulin lispro (HumaLOG) 100 units/mL subcutaneous injection 1-6 Units  1-6 Units Subcutaneous TID AC Baron Hmazah MD        [START ON 4/28/2018] insulin lispro (HumaLOG) 100 units/mL subcutaneous injection 3 Units  3 Units Subcutaneous TID With Meals Baron Hamzah MD        ketorolac (TORADOL) injection 15 mg  15 mg Intravenous Q6H PRN Flori Pittman PA-C        lactated ringers infusion  100 mL/hr Intravenous Continuous Flori Pittman PA-C 100 mL/hr at 04/27/18 1323 100 mL/hr at 04/27/18 1323    levETIRAcetam (KEPPRA) tablet 500 mg  500 mg Oral BID Baron Hamzah MD        [START ON 4/28/2018] levothyroxine tablet 75 mcg  75 mcg Oral Early Morning MD Rosetta Mcgill Wilde Morena Leghorvirgie ON 4/28/2018] lisinopril (ZESTRIL) tablet 30 mg  30 mg Oral Daily Shahid Teixeira MD        [START ON 4/28/2018] metFORMIN (GLUCOPHAGE) tablet 1,000 mg  1,000 mg Oral Daily With Breakfast Shahid Teixeira MD        methocarbamol (ROBAXIN) tablet 500 mg  500 mg Oral Q6H PRN Marylen Manzanilla, PA-C        metoclopramide (REGLAN) injection 10 mg  10 mg Intravenous Once PRN Ancil Ormond, DO        morphine injection 2 mg  2 mg Intravenous Q2H PRN Marylen Manzanilla, PA-C        multivitamin-minerals (CENTRUM) tablet 1 tablet  1 tablet Oral Daily Marylen Manzanilla, PA-C   1 tablet at 04/27/18 1721    naloxone (NARCAN) injection 0 04 mg  0 04 mg Intravenous Q1MIN PRN Marylen Manzanilla, PA-C        ondansetron [facility]) injection 4 mg  4 mg Intravenous Q8H PRN Marylen Manzanilla, PA-C        oxyCODONE (ROXICODONE) immediate release tablet 10 mg  10 mg Oral Q4H PRN Marylen Manzanilla, PA-C        oxyCODONE (ROXICODONE) IR tablet 5 mg  5 mg Oral Q4H PRN Marylen Manzanilla, PA-C        sodium chloride 0 9 % infusion  125 mL/hr Intravenous Continuous Justin Lopez DO   Stopped at 04/27/18 1325    traMADol (ULTRAM) tablet 50 mg  50 mg Oral Q6H PRN Marylen Manzanilla, PA-C        trimethobenzamide Osmany Pellet) IM injection 200 mg  200 mg Intramuscular Once PRN Ancil Ormond, DO        zolpidem (AMBIEN) tablet 5 mg  5 mg Oral HS PRN Marylen Manzanilla, PA-C           Prescriptions Prior to Admission   Medication    amLODIPine (NORVASC) 10 mg tablet    atorvastatin (LIPITOR) 20 mg tablet    celecoxib (CeleBREX) 100 mg capsule    clopidogrel (PLAVIX) 75 mg tablet    fenofibrate (TRICOR) 145 mg tablet    FLUoxetine (PROzac) 20 MG tablet    hydrochlorothiazide (HYDRODIURIL) 25 mg tablet    insulin glargine (LANTUS) 100 units/mL subcutaneous injection    insulin lispro (HumaLOG) 100 units/mL injection    levETIRAcetam (KEPPRA) 250 mg tablet    levothyroxine 75 mcg tablet    lisinopril (ZESTRIL) 30 mg tablet    metFORMIN (GLUCOPHAGE) 1000 MG tablet    aspirin 81 mg chewable tablet    Glucosamine-Chondroit-Vit C-Mn (GLUCOSAMINE CHONDR 1500 COMPLX PO)    Omega-3 Fatty Acids (FISH OIL) 1000 MG CPDR           PHYSICAL EXAM:    Vitals:  HR:  [] 105  Resp:  [13-20] 16  BP: (100-137)/(53-63) 112/58  SpO2:  [92 %-100 %] 95 %  Temp (24hrs), Av 6 °F (36 4 °C), Min:96 7 °F (35 9 °C), Max:98 8 °F (37 1 °C)  Current: Temperature: 98 8 °F (37 1 °C)  Body mass index is 29 29 kg/m²  General Appearance:    Awake, alert, cooperative, no distress, appears of stated age  Head:    Normocephalic, without obvious abnormality, atraumatic   Eyes:    Pupils equal in size,conjunctiva/corneas clear, EOM's intact  Ears:    External ear canals, both ears no drainage or redness  Nose:   No evidence of epistaxis/ discharge from nares  Throat:   Lips, mucosa, and tongue normal    Neck:   Supple, symmetrical, trachea midline, no JVP  Back:     Symmetric, no curvature, no CVA tenderness   Lungs:     Bilateral air entry is broncho-alveolar and equal        No crepitation or rales  No pleural rub  Heart:    Regular rate and rhythm, S1S2 normal, no murmur, rub  Abdomen:     Soft, slightly protuberant, central obesity, abdominal folds over hands are inguinal area, non-tender, no masses, no organomegaly   Genitalia:   Indwelling Call catheter  Clear urine +, No hematuria  Musculoskeletal:   Extremities appear normal, atraumatic, no cyanosis or edema  Surgical site on the operated left hip has clear dressing / no bleeding or hemorrhage apparent  Vascular:   2+ in Posterior tibialis / dorsalis pedis  Skin:   Skin color, texture, turgor normal, no rashes or lesions   Neurologic:   Oriented/ Awake/ facial symmetry maintained  Speech is intact  Muscle bulk and strength is equivocal in B/L Upper and lower extremities except on operated left lower limb  Light sensation is intact B/l LE  B/L [place] flexion is WNL       LABS, IMAGING, & OTHER STUDIES:  Lab Results:  I have personally reviewed pertinent labs  Lab Results   Component Value Date     04/03/2018     04/03/2018    CO2 24 04/03/2018    ANIONGAP 16 (H) 04/03/2018    BUN 37 (H) 04/03/2018    CREATININE 0 96 04/03/2018    EGFR 61 04/03/2018    GLUCOSE 113 01/15/2018    CALCIUM 9 1 04/03/2018    AST 13 04/03/2018    ALT 20 04/03/2018    ALKPHOS 53 04/03/2018    PROT 7 5 04/03/2018    BILITOT 0 57 04/03/2018     Lab Results   Component Value Date    WBC 7 21 04/03/2018    WBC 5 63 01/15/2018    WBC 6 31 07/31/2017    HGB 13 2 04/03/2018    HGB 12 0 01/15/2018    HGB 11 3 (L) 07/31/2017     04/03/2018     01/15/2018     07/31/2017       Lab Results   Component Value Date    INR 1 06 04/27/2018    INR 0 86 07/23/2017    HGBA1C 5 7 01/15/2018    HGBA1C 6 6 (H) 07/24/2017    HGBA1C 6 4 (H) 05/05/2017    HGBA1C 7 3 (H) 09/21/2015    HGBA1C 7 6 (H) 07/06/2015    HGBA1C 7 7 (H) 02/24/2015         Imaging Studies:   I have personally reviewed pertinent imaging study reports  Imaging:     Xr Chest Pa & Lateral    Result Date: 4/5/2018  Narrative: CHEST INDICATION:   Z01 818: Encounter for other preprocedural examination  M16 12: Unilateral primary osteoarthritis, left hip  COMPARISON:  7/23/2017, CT chest, abdomen and pelvis 6/8/2016 EXAM PERFORMED/VIEWS:  XR CHEST PA & LATERAL FINDINGS: Cardiomediastinal silhouette appears unremarkable  The lungs are clear  No pneumothorax or pleural effusion  Osseous structures appear within normal limits for patient age  Impression: No acute cardiopulmonary disease  Workstation performed: [id number]     Xr Hip/pelv 1 Vw Left     Result Date: 4/27/2018  Narrative: LEFT HIP INDICATION:    Postoperative left hip  COMPARISON:  Left femur x-rays of 7/5/2013 VIEWS:  XR HIP/PELV 1 VW LEFT W PELVIS IF PERFORMED FINDINGS: Patient is status post hip replacement  Hardware is intact  Alignment is satisfactory    Visualized osseous structures are intact  No lytic or blastic osseous lesions  Patchy gas lateral to the hip is likely postoperative  The visualized lumbar spine is unremarkable  Impression: Status post left hip replacement  Satisfactory alignment  Workstation performed: [id number]       EKG, Pathology, and Other Studies:   I have personally reviewed pertinent reports  Preoperative electrocardiogram dated 04/03/2018 was noted, showed normal sinus rhythm at 60 beats per minute, pr interval 122 milliseconds, QTC interval 424 milliseconds  Portions of the record may have been created with voice recognition software  Occasional wrong word or "sound a like" substitutions may have occurred due to the inherent limitations of voice recognition software  Read the chart carefully and recognize, using context, where substitutions have occurred

## 2018-04-28 LAB
ANION GAP SERPL CALCULATED.3IONS-SCNC: 9 MMOL/L (ref 4–13)
BASOPHILS # BLD AUTO: 0 THOUSANDS/ΜL (ref 0–0.1)
BASOPHILS NFR BLD AUTO: 0 % (ref 0–1)
BUN SERPL-MCNC: 35 MG/DL (ref 5–25)
CALCIUM SERPL-MCNC: 8 MG/DL (ref 8.3–10.1)
CHLORIDE SERPL-SCNC: 105 MMOL/L (ref 100–108)
CO2 SERPL-SCNC: 24 MMOL/L (ref 21–32)
CREAT SERPL-MCNC: 0.89 MG/DL (ref 0.6–1.3)
EOSINOPHIL # BLD AUTO: 0.01 THOUSAND/ΜL (ref 0–0.61)
EOSINOPHIL NFR BLD AUTO: 0 % (ref 0–6)
ERYTHROCYTE [DISTWIDTH] IN BLOOD BY AUTOMATED COUNT: 12.8 % (ref 11.6–15.1)
EST. AVERAGE GLUCOSE BLD GHB EST-MCNC: 134 MG/DL
GFR SERPL CREATININE-BSD FRML MDRD: 67 ML/MIN/1.73SQ M
GLUCOSE SERPL-MCNC: 122 MG/DL (ref 65–140)
GLUCOSE SERPL-MCNC: 132 MG/DL (ref 65–140)
GLUCOSE SERPL-MCNC: 169 MG/DL (ref 65–140)
GLUCOSE SERPL-MCNC: 193 MG/DL (ref 65–140)
GLUCOSE SERPL-MCNC: 199 MG/DL (ref 65–140)
GLUCOSE SERPL-MCNC: 213 MG/DL (ref 65–140)
GLUCOSE SERPL-MCNC: 242 MG/DL (ref 65–140)
GLUCOSE SERPL-MCNC: 404 MG/DL (ref 65–140)
HBA1C MFR BLD: 6.3 % (ref 4.2–6.3)
HCT VFR BLD AUTO: 19.3 % (ref 34.8–46.1)
HCT VFR BLD AUTO: 28.8 % (ref 34.8–46.1)
HGB BLD-MCNC: 6.5 G/DL (ref 11.5–15.4)
HGB BLD-MCNC: 9.5 G/DL (ref 11.5–15.4)
INR PPP: 1.18 (ref 0.86–1.16)
LYMPHOCYTES # BLD AUTO: 1.24 THOUSANDS/ΜL (ref 0.6–4.47)
LYMPHOCYTES NFR BLD AUTO: 21 % (ref 14–44)
MAGNESIUM SERPL-MCNC: 1.7 MG/DL (ref 1.6–2.6)
MCH RBC QN AUTO: 30.8 PG (ref 26.8–34.3)
MCHC RBC AUTO-ENTMCNC: 33.7 G/DL (ref 31.4–37.4)
MCV RBC AUTO: 92 FL (ref 82–98)
MONOCYTES # BLD AUTO: 1.17 THOUSAND/ΜL (ref 0.17–1.22)
MONOCYTES NFR BLD AUTO: 20 % (ref 4–12)
NEUTROPHILS # BLD AUTO: 3.53 THOUSANDS/ΜL (ref 1.85–7.62)
NEUTS SEG NFR BLD AUTO: 59 % (ref 43–75)
NRBC BLD AUTO-RTO: 0 /100 WBCS
PLATELET # BLD AUTO: 207 THOUSANDS/UL (ref 149–390)
PMV BLD AUTO: 10.7 FL (ref 8.9–12.7)
POTASSIUM SERPL-SCNC: 4.2 MMOL/L (ref 3.5–5.3)
PROTHROMBIN TIME: 15.1 SECONDS (ref 12.1–14.4)
RBC # BLD AUTO: 2.11 MILLION/UL (ref 3.81–5.12)
SODIUM SERPL-SCNC: 138 MMOL/L (ref 136–145)
TROPONIN I SERPL-MCNC: <0.02 NG/ML
WBC # BLD AUTO: 5.95 THOUSAND/UL (ref 4.31–10.16)

## 2018-04-28 PROCEDURE — 84484 ASSAY OF TROPONIN QUANT: CPT | Performed by: INTERNAL MEDICINE

## 2018-04-28 PROCEDURE — 85610 PROTHROMBIN TIME: CPT | Performed by: PHYSICIAN ASSISTANT

## 2018-04-28 PROCEDURE — 30233N1 TRANSFUSION OF NONAUTOLOGOUS RED BLOOD CELLS INTO PERIPHERAL VEIN, PERCUTANEOUS APPROACH: ICD-10-PCS | Performed by: INTERNAL MEDICINE

## 2018-04-28 PROCEDURE — 80048 BASIC METABOLIC PNL TOTAL CA: CPT | Performed by: PHYSICIAN ASSISTANT

## 2018-04-28 PROCEDURE — 97116 GAIT TRAINING THERAPY: CPT

## 2018-04-28 PROCEDURE — 83036 HEMOGLOBIN GLYCOSYLATED A1C: CPT | Performed by: INTERNAL MEDICINE

## 2018-04-28 PROCEDURE — 99233 SBSQ HOSP IP/OBS HIGH 50: CPT | Performed by: INTERNAL MEDICINE

## 2018-04-28 PROCEDURE — 82948 REAGENT STRIP/BLOOD GLUCOSE: CPT

## 2018-04-28 PROCEDURE — P9016 RBC LEUKOCYTES REDUCED: HCPCS

## 2018-04-28 PROCEDURE — 97530 THERAPEUTIC ACTIVITIES: CPT

## 2018-04-28 PROCEDURE — 85018 HEMOGLOBIN: CPT | Performed by: INTERNAL MEDICINE

## 2018-04-28 PROCEDURE — 97110 THERAPEUTIC EXERCISES: CPT

## 2018-04-28 PROCEDURE — 85025 COMPLETE CBC W/AUTO DIFF WBC: CPT | Performed by: INTERNAL MEDICINE

## 2018-04-28 PROCEDURE — 83735 ASSAY OF MAGNESIUM: CPT | Performed by: INTERNAL MEDICINE

## 2018-04-28 PROCEDURE — 85014 HEMATOCRIT: CPT | Performed by: INTERNAL MEDICINE

## 2018-04-28 PROCEDURE — 93005 ELECTROCARDIOGRAM TRACING: CPT | Performed by: INTERNAL MEDICINE

## 2018-04-28 RX ORDER — AMLODIPINE BESYLATE 5 MG/1
5 TABLET ORAL DAILY
Status: DISCONTINUED | OUTPATIENT
Start: 2018-04-29 | End: 2018-04-29

## 2018-04-28 RX ORDER — LISINOPRIL 10 MG/1
10 TABLET ORAL DAILY
Status: DISCONTINUED | OUTPATIENT
Start: 2018-04-29 | End: 2018-04-29

## 2018-04-28 RX ORDER — WARFARIN SODIUM 5 MG/1
5 TABLET ORAL
Status: COMPLETED | OUTPATIENT
Start: 2018-04-28 | End: 2018-04-28

## 2018-04-28 RX ADMIN — Medication 1 TABLET: at 08:42

## 2018-04-28 RX ADMIN — WARFARIN SODIUM 5 MG: 5 TABLET ORAL at 19:59

## 2018-04-28 RX ADMIN — CELECOXIB 200 MG: 200 CAPSULE ORAL at 08:42

## 2018-04-28 RX ADMIN — FLUOXETINE 20 MG: 20 CAPSULE ORAL at 21:47

## 2018-04-28 RX ADMIN — INSULIN LISPRO 3 UNITS: 100 INJECTION, SOLUTION INTRAVENOUS; SUBCUTANEOUS at 17:29

## 2018-04-28 RX ADMIN — FENOFIBRATE 145 MG: 145 TABLET, FILM COATED ORAL at 08:42

## 2018-04-28 RX ADMIN — ATORVASTATIN CALCIUM 20 MG: 20 TABLET, FILM COATED ORAL at 17:28

## 2018-04-28 RX ADMIN — MORPHINE SULFATE 2 MG: 2 INJECTION, SOLUTION INTRAMUSCULAR; INTRAVENOUS at 16:20

## 2018-04-28 RX ADMIN — INSULIN LISPRO 2 UNITS: 100 INJECTION, SOLUTION INTRAVENOUS; SUBCUTANEOUS at 12:06

## 2018-04-28 RX ADMIN — LEVETIRACETAM 500 MG: 500 TABLET ORAL at 17:32

## 2018-04-28 RX ADMIN — LEVOTHYROXINE SODIUM 75 MCG: 75 TABLET ORAL at 06:37

## 2018-04-28 RX ADMIN — INSULIN LISPRO 2 UNITS: 100 INJECTION, SOLUTION INTRAVENOUS; SUBCUTANEOUS at 08:44

## 2018-04-28 RX ADMIN — INSULIN LISPRO 3 UNITS: 100 INJECTION, SOLUTION INTRAVENOUS; SUBCUTANEOUS at 12:06

## 2018-04-28 RX ADMIN — INSULIN LISPRO 3 UNITS: 100 INJECTION, SOLUTION INTRAVENOUS; SUBCUTANEOUS at 08:43

## 2018-04-28 RX ADMIN — OXYCODONE HYDROCHLORIDE 10 MG: 10 TABLET ORAL at 17:31

## 2018-04-28 RX ADMIN — METFORMIN HYDROCHLORIDE 1000 MG: 500 TABLET, FILM COATED ORAL at 08:42

## 2018-04-28 RX ADMIN — DOCUSATE SODIUM 100 MG: 100 CAPSULE, LIQUID FILLED ORAL at 08:42

## 2018-04-28 RX ADMIN — INSULIN HUMAN 12 UNITS: 100 INJECTION, SOLUTION PARENTERAL at 02:23

## 2018-04-28 RX ADMIN — AMLODIPINE BESYLATE 10 MG: 10 TABLET ORAL at 08:42

## 2018-04-28 RX ADMIN — LISINOPRIL 30 MG: 10 TABLET ORAL at 08:42

## 2018-04-28 RX ADMIN — SODIUM CHLORIDE, SODIUM LACTATE, POTASSIUM CHLORIDE, AND CALCIUM CHLORIDE 500 ML: .6; .31; .03; .02 INJECTION, SOLUTION INTRAVENOUS at 09:03

## 2018-04-28 RX ADMIN — KETOROLAC TROMETHAMINE 15 MG: 30 INJECTION, SOLUTION INTRAMUSCULAR at 08:46

## 2018-04-28 RX ADMIN — INSULIN GLARGINE 10 UNITS: 100 INJECTION, SOLUTION SUBCUTANEOUS at 21:45

## 2018-04-28 RX ADMIN — SODIUM CHLORIDE 500 ML: 0.9 INJECTION, SOLUTION INTRAVENOUS at 00:00

## 2018-04-28 RX ADMIN — INSULIN LISPRO 4 UNITS: 100 INJECTION, SOLUTION INTRAVENOUS; SUBCUTANEOUS at 21:47

## 2018-04-28 RX ADMIN — CEFAZOLIN SODIUM 1000 MG: 10 INJECTION, POWDER, FOR SOLUTION INTRAVENOUS at 02:23

## 2018-04-28 RX ADMIN — LEVETIRACETAM 500 MG: 500 TABLET ORAL at 08:42

## 2018-04-28 NOTE — OCCUPATIONAL THERAPY NOTE
Freda Braxton  4/28/2018    Attempted to see pt for OT treatment session this AM, however  pt cx'd this AM  Rapid response called on pt 2* acute vasovagal event while working w/ Physical Therapy  OT to follow as able to A w/ safe d/c planning/recommendations       Margie Erazo, OTR/L

## 2018-04-28 NOTE — PROGRESS NOTES
Orthopedic Total Hip Progress Note      Subjective     Status post-Left total hip arthroplasty  Systemic or Specific Complaints: No Complaints    Objective     Vital signs in last 24 hours:  Temp:  [96 7 °F (35 9 °C)-98 8 °F (37 1 °C)] 98 5 °F (36 9 °C)  HR:  [] 85  Resp:  [13-20] 18  BP: (100-135)/(53-67) 135/67    Neurovascular: Capillary refill: Normal  Dorsiflexion: 5/5  Plantarflexion:  5/5   Wound: Dressing:  clean/dry/intact   DVT Exam: No evidence of DVT seen on physical exam   Negative Blair's sign  No cords or calf tenderness  No significant calf/ankle edema  Data Review:  CBC:   Lab Results   Component Value Date    WBC 7 21 04/03/2018    WBC 5 56 09/21/2015    RBC 4 20 04/03/2018    RBC 4 53 09/21/2015    HGB 13 2 04/03/2018    HGB 13 3 09/21/2015    HCT 38 5 04/03/2018    HCT 38 6 09/21/2015     04/03/2018     09/21/2015       Assessment/Plan     Status post-Left total hip arthroplasty: Doing well postoperatively  Hypotensive with sitting in chair   bolus  HGB drawn       Continues current post-op course    Activity: up with assistance      Jose Manuel Claudio PA-C    Date: 4/28/2018  Time: 8:54 AM

## 2018-04-28 NOTE — RAPID RESPONSE
Progress Note - Rapid Response   Aline Velasquez 80 y o  female MRN: [id number]    Time Called ( Time): 815  Date Called: 4/28/18  Level of Care: MS  Room#: 405  [id number] Time ( Time): 816  Event End Time ( Time): 820  MEWS score at time of Rapid Response: *  Primary reason for call: Acute change in mental status  Interventions:  Airway/Breathing:  O2 Mask/Nasal  Circulation: N/A  Other Treatments: Accu-Chek ordered blood pressure and heart rate evaluated, CBC order       Assessment:   1  Presyncope likely secondary to vasovagal event  2  History of seizure disorder unlikely to be a primary seizure event  3  Patient appeared to have severe hyperglycemia yesterday possibly bordering on DKA with a mild acidosis intervened overnight with insulin chemistry today appears normal without any evidence of acidosis and better control blood sugars  4  Pale appearance with no CBC ordered    Plan:   · Initiate diet, blood sugar management to keep blood sugars less than 180 with IV insulin, stat CBC       HPI/Chief Complaint (Background/Situation):   Aline Velasquez is a 80y o  year old female who presents with routine left hip replacement  Patient presented overnight to the rapid response team for hyperglycemia  Patient has been treated with IV insulin overnight  Patient awoke this morning was not fed breakfast yet and went for physical therapy  She stood up and walked to the doorway with physical therapy and became presyncopal dizzy and had an acute vasovagal event  Upon bring her back to the bed she had no tonic-clonic activity and awoke without postictal state  She is now awake alert oriented x3  She denies any nausea vomiting she denies any chest pain headache or dizziness      Historical Information   Past Medical History:   Diagnosis Date    Aneurysm of basilar artery ([facility] [address] ) 07/2015    Arthritis     Cardiac arrest ([facility] [address] ) 2014    Complex partial seizure with impairment of consciousness at onset ([place] [address] ) 07/2017    Dependent on wheelchair     "at times"    Diabetes mellitus ([place] [address] )     IDDM    Disease of thyroid gland     Hyperlipidemia     Hypertension     Left hip pain     Left knee pain     Muscle weakness     right sided weakness    Risk for falls     Seizures ([place] [address] )     "pt reports none recent"    Sleep apnea     "doesn't use machine"    Stenosis of middle cerebral artery 07/2015    Stroke ([place] [address] )     B BG, R frontal, L parietal, L pontine    Unsteady gait     Uses walker      Past Surgical History:   Procedure Laterality Date    ABDOMINAL SURGERY      benign mass per pt    DILATION AND CURETTAGE OF UTERUS      HYSTERECTOMY      TONSILLECTOMY      WISDOM TOOTH EXTRACTION       Social History   History   Alcohol Use    Yes     Comment: social     History   Drug Use No     History   Smoking Status    Never Smoker   Smokeless Tobacco    Never Used     Family History: non-contributory    Meds/Allergies     Current Facility-Administered Medications:  acetaminophen 650 mg Oral Q6H PRN [facility]    acetaminophen 975 mg Oral Once [facility],     amLODIPine 10 mg Oral Daily Sal Mckinley MD    atorvastatin 20 mg Oral Daily With Nubia Ceballos MD    bisacodyl 10 mg Rectal Daily PRN [facility]    calcium carbonate 1,000 mg Oral Daily PRN [facility]    celecoxib 200 mg Oral Daily [facility]    diphenhydrAMINE 50 mg Intravenous Q6H PRN [facility]    docusate sodium 100 mg Oral BID [facility]    fenofibrate 145 mg Oral Daily Sal Mckinley MD    FLUoxetine 20 mg Oral HS Sal Mckinley MD    hydrochlorothiazide 25 mg Oral Daily Sal Mckinley MD    insulin glargine 10 Units Subcutaneous HS Sal Mckinley MD    insulin lispro 2-12 Units Subcutaneous TID AC Sal Mckinley MD    insulin lispro 2-12 Units Subcutaneous HS Sal Mckinley MD    insulin lispro 3 Units Subcutaneous TID With Meals Sal Mckinley MD    ketorolac 15 mg Intravenous Q6H PRN [facility], [facility]    lactated ringers 100 mL/hr Intravenous Continuous [facility] Last Rate: 100 mL/hr (04/27/18 1942)   levETIRAcetam 500 mg Oral BID Sal Mckinley MD    levothyroxine 75 mcg Oral Early Morning Sal Mckinley MD    lisinopril 30 mg Oral Daily Sal Mckinley MD    metFORMIN 1,000 mg Oral Daily With Breakfast Sal Mckinley MD    methocarbamol 500 mg Oral Q6H PRN [facility]    metoclopramide 10 mg Intravenous Once PRN Robinson Ng DO    morphine injection 2 mg Intravenous Q2H PRN [facility]    multivitamin-minerals 1 tablet Oral Daily [facility]    naloxone 0 04 mg Intravenous Q1MIN PRN [facility]    ondansetron 4 mg Intravenous Q8H PRN [facility]    oxyCODONE 10 mg Oral Q4H PRN [facility]    oxyCODONE 5 mg Oral Q4H PRN [facility]    sodium chloride 125 mL/hr Intravenous Continuous Justin Lopez DO Last Rate: Stopped (04/27/18 1325)   traMADol 50 mg Oral Q6H PRN [facility]          lactated ringers 100 mL/hr Last Rate: 100 mL/hr (04/27/18 1942)   sodium chloride 125 mL/hr Last Rate: Stopped (04/27/18 1325)       No Known Allergies    ROS: Negative except dizziness resolved    Physical Exam:  Gen:  Awake alert oriented x3  HEENT:  No acute abnormalities  Neck:  No JVD  Chest:  Clear to auscultation  Cor:  Regular  Abd:  Soft nontender  Ext:  No edema clubbing or cyanosis  Neuro:  No acute abnormalities no focality toHer neurologic exam  Skin:  No abnormalities      Intake/Output Summary (Last 24 hours) at 04/28/18 0827  Last data filed at 04/28/18 4499   Gross per 24 hour   Intake          4306 66 ml   Output             2450 ml   Net          1856 66 ml       Respiratory    Lab Data (Last 4 hours)    None         O2/Vent Data (Last 4 hours)    None              Invasive Devices     Peripheral Intravenous Line            Peripheral IV 04/27/18 Left Wrist less than 1 day          Airway Non-Surgical Airway 90 mm less than 1 day                DIAGNOSTIC DATA:    Lab: I have personally reviewed pertinent lab results  CBC:       CMP:     Results from last 7 days  Lab Units 04/28/18  0456 04/27/18  2332 04/27/18  2122   SODIUM mmol/L 138  --  134*   POTASSIUM mmol/L 4 2  --  5 1   CHLORIDE mmol/L 105  --  100   CO2 mmol/L 24  --  19*   BUN mg/dL 35*  --  32*   CREATININE mg/dL 0 89  --  1 42*   CALCIUM mg/dL 8 0*  --  8 4   TOTAL PROTEIN g/dL  --   --  5 8*   BILIRUBIN TOTAL mg/dL  --   --  0 25   ALK PHOS U/L  --   --  57   ALT U/L  --   --  22   AST U/L  --   --  24   GLUCOSE RANDOM mg/dL 122 457* 567*     PT/INR:   Lab Results   Component Value Date    INR 1 18 (H) 04/28/2018   ,   Magnesium: No results found for: MAG,   Phosphorous: No results found for: PHOS    Microbiology:  Lab Results   Component Value Date    BLOODCX No Growth After 5 Days  07/23/2017    BLOODCX No Growth After 5 Days  07/23/2017    URINECX <10,000 cfu/ml Gram Negative Tanvir 07/23/2017         OUTCOME:   Stayed in room   Family notified of transfer: no  Code Status: Level 1 - Full Code  Critical Care Time: Total Critical Care time spent 20 minutes excluding procedures, teaching and family updates

## 2018-04-28 NOTE — PHYSICAL THERAPY NOTE
PHYSICAL THERAPY NOTE          Patient Name: Kita Campbell  [facility] Date: 4/28/2018 04/28/18 3685   Pain Assessment   Pain Assessment 0-10   Pain Score 4   Pain Type Acute pain;Surgical pain   Pain Location Hip   Pain Orientation Left   Pain Descriptors Aching   Pain Frequency Constant/continuous   Pain Onset Ongoing   Clinical Progression Gradually improving   Effect of Pain on Daily Activities increased pain with activity    Patient's Stated Pain Goal No pain   Hospital Pain Intervention(s) Ambulation/increased activity;Repositioned   Response to Interventions tolerated    Restrictions/Precautions   Weight Bearing Precautions Per Order Yes   LLE Weight Bearing Per Order WBAT   Other Precautions Multiple lines; Fall Risk;Pain;THR  (posterolateral hip precautions )   General   Chart Reviewed Yes   Additional Pertinent History Rapid response called (see assessment)   Response to Previous Treatment Patient with no complaints from previous session  Family/Caregiver Present No   Cognition   Overall Cognitive Status WFL   Arousal/Participation Alert   Attention Within functional limits   Orientation Level Oriented to person;Oriented to place;Oriented to time   Memory Within functional limits   Following Commands Follows one step commands without difficulty   Subjective   Subjective willing to participate in PT    Bed Mobility   Supine to Sit 4  Minimal assistance   Additional items Assist x 1; Increased time required   Sit to Supine 4  Minimal assistance   Additional items Increased time required;Verbal cues; Assist x 1   Transfers   Sit to Stand 4  Minimal assistance   Additional items Assist x 1; Increased time required;Verbal cues   Stand to Sit 4  Minimal assistance   Additional items Assist x 1; Increased time required;Verbal cues   Additional Comments VC needed for hand placement and safety with transfers    Ambulation/Elevation Gait pattern Excessively slow; Short stride; Foward flexed; Inconsistent jose   Gait Assistance 4  Minimal assist   Additional items Assist x 1   Assistive Device Rolling walker   Distance 25ft    Balance   Static Sitting Fair +   Static Standing Fair   Ambulatory Fair -   Activity Tolerance   Activity Tolerance Treatment limited secondary to medical complications (Comment)  (Rapid response called )   Nurse Made Aware Pt appropriate to be seen per Roger Jaimes RN   Exercises   THR Sitting;10 reps;AROM; Bilateral  (x 2 sets )   Assessment   Prognosis Good   Problem List Decreased strength;Decreased range of motion;Decreased endurance; Impaired balance;Decreased mobility;Orthopedic restrictions;Pain   Assessment Pt resting comfortably in bed at time of PT session  Pt reports feeling well today and is willing to participate in PT  Pt able to perform all bed mobility and transfers with less A required compared to previous session, however pt continues to require VC for hand placement and safety as well as to ensure hip precautions are followed with all mobility  Pt able to ambulate 25 ft this session with the use of a RW  During ambulation pt noted to have increased fatigue  Pt reports " Im feeling tired I'm going to go back to bed" at this time pt with decreased responsiveness and was assisted onto commode chair in doorway of room with assistance of OT Howard Ramirez and Roger Jaimes RN  Rapid response called  Pt then assisted back to bed with the assistance of nsg staff into supine position  BP taken back in bed: 175/75  O2 sats 92% on RA  Once pt placed back in supine position pt became more responsive and was alert and oriented x 3  Pt reports no new complaints back in supine position  Nsg Magui Manzano) and MD (Dr Ben Rodriguez) present at bed side at conclusion of PT session and made aware of activities pt was participating in with PT  Pt left with nsg Roger Jaimes RN in supine position with all needs within reach at conclusion of PT session   Pt denies any further questions at this time  PT will continue to follow  DC recommendation when medically cleared is home PT  Barriers to Discharge Inaccessible home environment   Goals   Patient Goals " to go home"   STG Expiration Date 05/04/18   Treatment Day 1   Plan   Treatment/Interventions Functional transfer training;LE strengthening/ROM; Elevations; Therapeutic exercise; Endurance training;Patient/family training;Equipment eval/education; Bed mobility;Gait training;Spoke to nursing;Spoke to MD;OT   Progress Slow progress, decreased activity tolerance   PT Frequency 7x/wk; Twice a day   Recommendation   Recommendation Home PT   Equipment Recommended Walker  (commode )   PT - OK to Discharge No  (need to increase ambulation distances and attempt steps )   Tiffanie Crowe, PT

## 2018-04-28 NOTE — PHYSICAL THERAPY NOTE
Physical Therapy Cancellation Note    Pm PT session canceled secondary to rapid response called this am as per internal medicine note  Pts Hgb 6 4  PT will continue to follow      Myrtle Arreola, PT

## 2018-04-28 NOTE — PLAN OF CARE
Problem: DISCHARGE PLANNING - CARE MANAGEMENT  Goal: Discharge to post-acute care or home with appropriate resources  INTERVENTIONS:  - Conduct assessment to determine patient/family and health care team treatment goals, and need for post-acute services based on payer coverage, community resources, and patient preferences, and barriers to discharge  - Address psychosocial, clinical, and financial barriers to discharge as identified in assessment in conjunction with the patient/family and health care team  - Arrange appropriate level of post-acute services according to patients   needs and preference and payer coverage in collaboration with the physician and health care team  - Communicate with and update the patient/family, physician, and health care team regarding progress on the discharge plan  - Arrange appropriate transportation to post-acute venues  Outcome: Adequate for Discharge    Discharge plan is [facility] for RN (coumadin) and PT  Pt will need a bedside commode  A referral sent to [facility] and they have accepted the case  Fax script for bedside commode to [facility] for them to deliver to pt's home

## 2018-04-28 NOTE — CASE MANAGEMENT
Initial Clinical Review    Age/Sex: 80 y o  female admitted on 4/27 for elective surgery - OR    Surgery Date: 4/27    Procedure: S/P ARTHROPLASTY HIP TOTAL (Left Hip)    Anesthesia: Spinal, Regional    Admission Orders: Date/Time/Statement: 4/27/18 @ 1514    Orders Placed This Encounter   Procedures    Inpatient Admission     Standing Status:   Standing     Number of Occurrences:   1     Order Specific Question:   Admitting Physician     Answer:   Katty Arrieta     Order Specific Question:   Level of Care     Answer:   Med Surg [16]     Order Specific Question:   Estimated length of stay     Answer:   More than 2 Midnights     Order Specific Question:   Certification     Answer:   I certify that inpatient services are medically necessary for this patient for a duration of greater than two midnights  See H&P and MD Progress Notes for additional information about the patient's course of treatment  Vital Signs: /67 (BP Location: Right arm)   Pulse 85   Temp 98 5 °F (36 9 °C) (Temporal)   Resp 18   Ht 4' 11" (1 499 m)   Wt 65 8 kg (145 lb)   SpO2 92%   BMI 29 29 kg/m²     Diet:        Diet Orders            Start     Ordered    04/27/18 1926  Diet Rico/CHO Controlled; Consistent Carbohydrate Diet Level 3 (6 carb servings/90 grams CHO/meal)  Diet effective now     Question Answer Comment   Diet Type Rico/CHO Controlled    Rico/CHO Controlled Consistent Carbohydrate Diet Level 3 (6 carb servings/90 grams CHO/meal)    RD to adjust diet per protocol?  Yes        04/27/18 1925          Mobility: Activity as tolerated  PT/OT eval and treat    DVT Prophylaxis: Sequential compression device    Scheduled Meds:  Current Facility-Administered Medications:  Cefazolin  Intravenous x3   acetaminophen 975 mg Oral Once   amLODIPine 10 mg Oral Daily   atorvastatin 20 mg Oral Daily With Dinner   celecoxib 200 mg Oral Daily   docusate sodium 100 mg Oral BID   fenofibrate 145 mg Oral Daily   FLUoxetine 20 mg Oral HS hydrochlorothiazide 25 mg Oral Daily   insulin glargine 10 Units Subcutaneous HS   insulin lispro 2-12 Units Subcutaneous TID AC   insulin lispro 2-12 Units Subcutaneous HS   insulin lispro 3 Units Subcutaneous TID With Meals   levETIRAcetam 500 mg Oral BID   levothyroxine 75 mcg Oral Early Morning   lisinopril 30 mg Oral Daily   metFORMIN 1,000 mg Oral Daily With Breakfast   multivitamin-minerals 1 tablet Oral Daily     Continuous Infusions:  lactated ringers 100 mL/hr Last Rate: 100 mL/hr (04/27/18 1942)   sodium chloride 125 mL/hr Last Rate: Stopped (04/27/18 1325)     PRN Meds:   acetaminophen    bisacodyl    calcium carbonate    diphenhydrAMINE    Ketorolac Iv x2    methocarbamol    metoclopramide    morphine injection    naloxone    ondansetron    oxyCODONE po x1    traMADol

## 2018-04-28 NOTE — PLAN OF CARE
Problem: PHYSICAL THERAPY ADULT  Goal: Performs mobility at highest level of function for planned discharge setting  See evaluation for individualized goals  Treatment/Interventions: Functional transfer training, LE strengthening/ROM, Elevations, Therapeutic exercise, Endurance training, Patient/family training, Bed mobility, Gait training, Spoke to nursing, OT, Family  Equipment Recommended: Coreen Petit, Other (Comment) (RW & BSC; pt has RW )       See flowsheet documentation for full assessment, interventions and recommendations  Outcome: Progressing  Prognosis: Good  Problem List: Decreased strength, Decreased range of motion, Decreased endurance, Impaired balance, Decreased mobility, Orthopedic restrictions, Pain  Assessment: Pt resting comfortably in bed at time of PT session  Pt reports feeling well today and is willing to participate in PT  Pt able to perform all bed mobility and transfers with less A required compared to previous session, however pt continues to require VC for hand placement and safety as well as to ensure hip precautions are followed with all mobility  Pt able to ambulate 25 ft this session with the use of a RW  During ambulation pt noted to have increased fatigue  Pt reports " Im feeling tired I'm going to go back to bed" at this time pt with decreased responsiveness and was assisted onto commode chair in doorway of room with assistance of OT Dian Austin and Javier Ley RN  Rapid response called  Pt then assisted back to bed with the assistance of [facility] staff into supine position  BP taken back in bed: 175/75  O2 sats 92% on RA  Once pt placed back in supine position pt became more responsive and was alert and oriented x 3  Pt reports no new complaints back in supine position  Michell Farah and MD (Dr Sergio Mei) present at bed side at conclusion of PT session and made aware of activities pt was participating in with PT   Pt left with michell Ley RN in supine position with all needs within reach at conclusion of PT session  Pt denies any further questions at this time  PT will continue to follow  DC recommendation when medically cleared is home PT  Barriers to Discharge: Inaccessible home environment     Recommendation: Home PT     PT - OK to Discharge: No (need to increase ambulation distances and attempt steps )    See flowsheet documentation for full assessment

## 2018-04-28 NOTE — PROGRESS NOTES
Pt's blood sugar is >500 via glucometer, correlation done via CMP- [id number]  12 units of Humalog insulin given as ordered by Dr Dora Vu

## 2018-04-28 NOTE — PROGRESS NOTES
Patient was getting PT when she almost fell to the floor  PT got her to the commode and elevated legs  Upon arriving patient was very pale and somewhat unresponsive, at that time I called a rapid response  We immediately lifted patient back to bed at which time she started to come back around  We put oxygen on at 6 liters and did vital signs 199/66 76 and 100 on 6 liter  Second set of vitals were 175/77 71 and `100 2 liters  Rapid response doctor did a complete assess and ordered some labs  Patients vital signs are currently 135/67 75 and 100 on 2L  PA from ortho arrived and spoke with patient at which time she was alert and oriented  Will bolus patient with 500LR and keep patient on BR until labs are resulted  Continue to monitor

## 2018-04-28 NOTE — PROGRESS NOTES
Spoke w/ Rob Spatzer CRNP re: pt's blood sugar of 457 after 2 separate doses of 12 units of Humalog  500ml fluid bolus ordered, recheck in one hour and call back if still severely elevated  Insulin still hasn't peaked  Also notified him of an Early warning sepsis score of 9%, sepsis screening done and is negative   No new orders

## 2018-04-28 NOTE — PROGRESS NOTES
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, left hip, failed conservative treatments  Ambulatory dysfunction  Type 2 diabetes mellitus  Obesity, BMI 29 29  Hypothyroidism  Hypertension  Obstructive sleep apnea, does not use CPAP  Basilar artery aneurysm, since 2015  History of cardiac arrest 2014 required CPR  Complex partial seizures, July 2017, follows Neurology at [facility]  Previous history of strokes, 2013, 2016   Acute blood loss anemia, hemoglobin dropped to 6 5 (preoperative hemoglobin done on 04/03/2018 was 13 2)  Postoperative surgical hematoma left gluteal area  Anticoagulated with warfarin with INR 1 18    Recommendation:    Cross type and match 2 units of packed RBC and transfuse/ patient consented and fully understands benefits and risk of blood transfusion   Check patient's electrocardiogram and monitor her on telemetry for at least 24 hr   Patient's previous magnesium levels were reportedly low, would recommend recheck on magnesium and also check her cardiac enzymes  Cancel all her therapy for now the patient stabilizes  Close monitoring and any further decompensation she will need to be transferred to ICU step-down  Appreciate recommendations and evaluation done by rapid response team   Avoid hypotension and use postoperative orthopedic hypotension protocol  Continue sliding scale insulin coverage with Lantus  Subjective:     Patient seen and examined today  EMR and overnight events reviewed  Reviewed and noted events of earlier this morning  Patient had elevated blood sugars and required 2 separate doses of 12 units of Humalog on coverage with fluid bolus as ordered by the nurse practitioner last night was managing this patient    Early this morning around 8:10 AM patient will working with therapy had a vasovagal near syncopal episode  Patient was apparently getting physical therapy when she got up to her commode and elevated her legs  Patient almost fell to the floor according to the nurse and she was lifted back to her bed and she came back around  A rapid response was called and patient was placed on oxygen at 6 L and a blood pressure was 199/66 with a pulse rate of 76 and 100% oxygen saturation on 6 L  repeat blood pressure check indicated blood pressure to be 175/77 she was evaluated by the rapid response team and by orthopedic team thereafter  If her hematoma worsens and her Hb drops then would hold warfarin  Nursing to monitor and inform if any new changes  Review of Systems     Constitutional: No chills, diaphoresis, fatigue or fever  Episode of orthostatic noted earlier as above   HEENT: No congestion, sore throat  No visual complaints  Respiratory: No cough, chest tightness, shortness of breath and wheezing  Cardiovascular: No chest pain and palpitations  GI: Negative for abdominal distention, pain, constipation, diarrhea or nausea  Genitourinary: Negative for decreased urine volume, dysuria, flank pain and urgency  Skin: Bruising on left lateral aspect of hip  Neurological: Negative for dizziness, weakness, numbness and headaches  Hematological: Negative for spontaneous bruising or bleeding  Psychiatric: Negative for confusion, dysphoric mood, hallucinations  All other systems reviewed and are negative         OBJECTIVE:     Vitals:     HR:  [] 85  Resp:  [13-20] 18  BP: (100-135)/(53-67) 135/67  SpO2:  [92 %-100 %] 92 %  Temp (24hrs), Av 8 °F (36 6 °C), Min:96 7 °F (35 9 °C), Max:98 8 °F (37 1 °C)  Current: Temperature: 98 5 °F (36 9 °C)    Intake/Output Summary (Last 24 hours) at 18 1059  Last data filed at 18 0643   Gross per 24 hour   Intake          3306 66 ml   Output             2450 ml   Net 856 66 ml     Body mass index is 29 29 kg/m²  Physical Exam    General Appearance:  Awake,alert, cooperative  Not in distress  Pallor + / Icterus/ Cyanosis negative  Oropharynx no thrush  Tongue protrudes in midline/ dry  Head:  Normocephalic, atraumatic  No titubations  Eyes:  No eye redness or discharge bilaterally  Neck: Supple, no raised JVP  Lungs:   B/L Clear to auscultation, no wheezing or rhonchi  Normal broncho-alveolar air entry  No pleural rubs  Heart:   Regular S1S2, A2P2 normal, no murmur or gallop  Abdomen:   Soft, non-tender,no rebound, bowel sounds+  Musculoskeletal: Extremities no cyanosis or edema  Surgical site on left hip has clean dressing  Large ecchymosis over hip with surgical dressing in the middle  No tenderness  No dehiscence,  No discharge or drainage  Psych: Normal Affect / No hallucinations or delusions  Neurologic:         Skin:    Endocrine:  Vascular: Awake and alert  Mentation intact  Speech is intact  Facial symmetry is maintained  Muscle strength is +4/5 in all muscle groups except on operated left lower limb  No rashes /purpura  No open wounds  Bruising + left hip  No thyromegaly / no lid lag  Homans sign is negative  B/L Calf are supple and non tender         Labs, Imaging, & Other studies:    All pertinent labs and imaging studies were personally reviewed    Results from last 7 days  Lab Units 04/28/18  0837   WBC Thousand/uL 5 95   HEMOGLOBIN g/dL 6 5*   PLATELETS Thousands/uL 207       Results from last 7 days  Lab Units 04/28/18  0456  04/27/18  2122   SODIUM mmol/L 138  --  134*   POTASSIUM mmol/L 4 2  --  5 1   CHLORIDE mmol/L 105  --  100   CO2 mmol/L 24  --  19*   ANION GAP mmol/L 9  --  15*   BUN mg/dL 35*  --  32*   CREATININE mg/dL 0 89  --  1 42*   EGFR ml/min/1 73sq m 67  --  38   GLUCOSE RANDOM mg/dL 122  < > 567*   CALCIUM mg/dL 8 0*  --  8 4   AST U/L  --   --  24   ALT U/L  --   --  22   ALK PHOS U/L  --   --  57   TOTAL PROTEIN g/dL  --   --  5 8*   BILIRUBIN TOTAL mg/dL  --   --  0 25   < > = values in this interval not displayed          Current Meds:  Current Facility-Administered Medications   Medication Dose Route Frequency Provider Last Rate Last Dose    acetaminophen (TYLENOL) tablet 650 mg  650 mg Oral Q6H PRN Joana West PA-C        acetaminophen (TYLENOL) tablet 975 mg  975 mg Oral Once Dhaval Patel,         amLODIPine (NORVASC) tablet 10 mg  10 mg Oral Daily Osmany Dorado MD   10 mg at 04/28/18 8916    atorvastatin (LIPITOR) tablet 20 mg  20 mg Oral Daily With Christin Haque MD   20 mg at 04/27/18 1938    bisacodyl (DULCOLAX) rectal suppository 10 mg  10 mg Rectal Daily PRN Joana West PA-C        calcium carbonate (TUMS) chewable tablet 1,000 mg  1,000 mg Oral Daily PRN Joana West PA-C        celecoxib (CeleBREX) capsule 200 mg  200 mg Oral Daily Joana West PA-C   200 mg at 04/28/18 2231    diphenhydrAMINE (BENADRYL) injection 50 mg  50 mg Intravenous Q6H PRN Joana West PA-C        docusate sodium (COLACE) capsule 100 mg  100 mg Oral BID Joana West PA-C   100 mg at 04/28/18 8307    fenofibrate (TRICOR) tablet 145 mg  145 mg Oral Daily Osmany Dorado MD   145 mg at 04/28/18 0842    FLUoxetine (PROzac) capsule 20 mg  20 mg Oral HS Osmany Dorado MD   20 mg at 04/27/18 2210    hydrochlorothiazide (HYDRODIURIL) tablet 25 mg  25 mg Oral Daily Osmany Dorado MD        insulin glargine (LANTUS) subcutaneous injection 10 Units 0 1 mL  10 Units Subcutaneous HS Osmany Dorado MD   10 Units at 04/27/18 2209    insulin lispro (HumaLOG) 100 units/mL subcutaneous injection 2-12 Units  2-12 Units Subcutaneous TID [facility] [place] Osmany Dorado MD   2 Units at 04/28/18 0844    insulin lispro (HumaLOG) 100 units/mL subcutaneous injection 2-12 Units  2-12 Units Subcutaneous HS Osmany Dorado MD   12 Units at 04/27/18 5302    insulin lispro (HumaLOG) 100 units/mL subcutaneous injection 3 Units  3 Units Subcutaneous TID With Meals Patricia Ovalle MD   3 Units at 04/28/18 0843    ketorolac (TORADOL) injection 15 mg  15 mg Intravenous Q6H PRN Chidi Gómez PA-C   15 mg at 04/28/18 0846    lactated ringers bolus 500 mL  500 mL Intravenous Once Marguerite Silva MD        lactated ringers infusion  100 mL/hr Intravenous Continuous Chidi Gómez PA-C 100 mL/hr at 04/27/18 1942 100 mL/hr at 04/27/18 1942    levETIRAcetam (KEPPRA) tablet 500 mg  500 mg Oral BID Patricia Ovalle MD   500 mg at 04/28/18 5381    levothyroxine tablet 75 mcg  75 mcg Oral Early Morning Patricia Ovalle MD   75 mcg at 04/28/18 9581    lisinopril (ZESTRIL) tablet 30 mg  30 mg Oral Daily Patricia Ovalle MD   30 mg at 04/28/18 2672    metFORMIN (GLUCOPHAGE) tablet 1,000 mg  1,000 mg Oral Daily With Breakfast Patricia Ovalle MD   1,000 mg at 04/28/18 8030    methocarbamol (ROBAXIN) tablet 500 mg  500 mg Oral Q6H PRN Chidi Gómez PA-C        metoclopramide (REGLAN) injection 10 mg  10 mg Intravenous Once PRN Sonido Chavez DO        morphine injection 2 mg  2 mg Intravenous Q2H PRN Chidi Gómez PA-C        multivitamin-minerals (CENTRUM) tablet 1 tablet  1 tablet Oral Daily Chidi Gómez PA-C   1 tablet at 04/28/18 7691    naloxone (NARCAN) injection 0 04 mg  0 04 mg Intravenous Q1MIN PRN Chidi Gómez PA-C        ondansetron [facility]) injection 4 mg  4 mg Intravenous Q8H PRN Chidi Gómez PA-C        oxyCODONE (ROXICODONE) immediate release tablet 10 mg  10 mg Oral Q4H PRN Chidi Gómez PA-C        oxyCODONE (ROXICODONE) IR tablet 5 mg  5 mg Oral Q4H PRN Chidi Gómez PA-C   5 mg at 04/27/18 1941    sodium chloride 0 9 % infusion  125 mL/hr Intravenous Continuous Himanshu Puckett DO   Stopped at 04/27/18 1325    traMADol (ULTRAM) tablet 50 mg  50 mg Oral Q6H PRN Chidi Gómez PA-C         Home Meds:  Prescriptions Prior to Admission   Medication    amLODIPine (NORVASC) 10 mg tablet    atorvastatin (LIPITOR) 20 mg tablet    celecoxib (CeleBREX) 100 mg capsule    clopidogrel (PLAVIX) 75 mg tablet    fenofibrate (TRICOR) 145 mg tablet    FLUoxetine (PROzac) 20 MG tablet    hydrochlorothiazide (HYDRODIURIL) 25 mg tablet    insulin glargine (LANTUS) 100 units/mL subcutaneous injection    insulin lispro (HumaLOG) 100 units/mL injection    levETIRAcetam (KEPPRA) 250 mg tablet    levothyroxine 75 mcg tablet    lisinopril (ZESTRIL) 30 mg tablet    metFORMIN (GLUCOPHAGE) 1000 MG tablet    aspirin 81 mg chewable tablet    Glucosamine-Chondroit-Vit C-Mn (GLUCOSAMINE CHONDR 1500 COMPLX PO)    Omega-3 Fatty Acids (FISH OIL) 1000 MG CPDR       Continuous Infusions:    lactated ringers 100 mL/hr Last Rate: 100 mL/hr (04/27/18 1942)   sodium chloride 125 mL/hr Last Rate: Stopped (04/27/18 1325)       VTE Pharmacologic Prophylaxis: Warfarin (Coumadin) as per Primary Ortho Team   VTE Mechanical Prophylaxis: sequential compression device    Portions of the record may have been created with voice recognition software  Occasional wrong word or "sound a like" substitutions may have occurred due to the inherent limitations of voice recognition software  Read the chart carefully and recognize, using context, where substitutions have occurred

## 2018-04-28 NOTE — PLAN OF CARE
Problem: Potential for Falls  Goal: Patient will remain free of falls  INTERVENTIONS:  - Assess patient frequently for physical needs  -  Identify cognitive and physical deficits and behaviors that affect risk of falls    -  Porter fall precautions as indicated by assessment   - Educate patient/family on patient safety including physical limitations  - Instruct patient to call for assistance with activity based on assessment  - Modify environment to reduce risk of injury  - Consider OT/PT consult to assist with strengthening/mobility   Outcome: Progressing

## 2018-04-28 NOTE — PROGRESS NOTES
Blood sugar by glucometer 1 hour later is still >500  Another 12 units of Humalog given per hs sliding scale

## 2018-04-29 PROBLEM — G47.33 OBSTRUCTIVE SLEEP APNEA SYNDROME: Chronic | Status: ACTIVE | Noted: 2018-04-29

## 2018-04-29 LAB
ABO GROUP BLD BPU: NORMAL
ABO GROUP BLD BPU: NORMAL
ANION GAP SERPL CALCULATED.3IONS-SCNC: 9 MMOL/L (ref 4–13)
BPU ID: NORMAL
BPU ID: NORMAL
BUN SERPL-MCNC: 29 MG/DL (ref 5–25)
CALCIUM SERPL-MCNC: 8.4 MG/DL (ref 8.3–10.1)
CHLORIDE SERPL-SCNC: 104 MMOL/L (ref 100–108)
CO2 SERPL-SCNC: 24 MMOL/L (ref 21–32)
CREAT SERPL-MCNC: 0.78 MG/DL (ref 0.6–1.3)
CROSSMATCH: NORMAL
CROSSMATCH: NORMAL
GFR SERPL CREATININE-BSD FRML MDRD: 79 ML/MIN/1.73SQ M
GLUCOSE SERPL-MCNC: 197 MG/DL (ref 65–140)
GLUCOSE SERPL-MCNC: 213 MG/DL (ref 65–140)
GLUCOSE SERPL-MCNC: 217 MG/DL (ref 65–140)
GLUCOSE SERPL-MCNC: 276 MG/DL (ref 65–140)
GLUCOSE SERPL-MCNC: 315 MG/DL (ref 65–140)
INR PPP: 1.13 (ref 0.86–1.16)
POTASSIUM SERPL-SCNC: 4.4 MMOL/L (ref 3.5–5.3)
PROTHROMBIN TIME: 14.5 SECONDS (ref 12.1–14.4)
SODIUM SERPL-SCNC: 137 MMOL/L (ref 136–145)
UNIT DISPENSE STATUS: NORMAL
UNIT DISPENSE STATUS: NORMAL
UNIT PRODUCT CODE: NORMAL
UNIT PRODUCT CODE: NORMAL
UNIT RH: NORMAL
UNIT RH: NORMAL

## 2018-04-29 PROCEDURE — 97110 THERAPEUTIC EXERCISES: CPT

## 2018-04-29 PROCEDURE — 82948 REAGENT STRIP/BLOOD GLUCOSE: CPT

## 2018-04-29 PROCEDURE — 80048 BASIC METABOLIC PNL TOTAL CA: CPT | Performed by: PHYSICIAN ASSISTANT

## 2018-04-29 PROCEDURE — 97530 THERAPEUTIC ACTIVITIES: CPT

## 2018-04-29 PROCEDURE — 97535 SELF CARE MNGMENT TRAINING: CPT

## 2018-04-29 PROCEDURE — 85610 PROTHROMBIN TIME: CPT | Performed by: PHYSICIAN ASSISTANT

## 2018-04-29 PROCEDURE — 97116 GAIT TRAINING THERAPY: CPT

## 2018-04-29 RX ORDER — AMLODIPINE BESYLATE 2.5 MG/1
2.5 TABLET ORAL DAILY
Status: DISCONTINUED | OUTPATIENT
Start: 2018-04-30 | End: 2018-04-30 | Stop reason: HOSPADM

## 2018-04-29 RX ORDER — WARFARIN SODIUM 6 MG/1
6 TABLET ORAL
Status: COMPLETED | OUTPATIENT
Start: 2018-04-29 | End: 2018-04-29

## 2018-04-29 RX ORDER — LISINOPRIL 5 MG/1
5 TABLET ORAL DAILY
Status: DISCONTINUED | OUTPATIENT
Start: 2018-04-30 | End: 2018-04-30 | Stop reason: HOSPADM

## 2018-04-29 RX ORDER — TRAMADOL HYDROCHLORIDE 50 MG/1
25 TABLET ORAL EVERY 6 HOURS PRN
Status: DISCONTINUED | OUTPATIENT
Start: 2018-04-29 | End: 2018-04-30 | Stop reason: HOSPADM

## 2018-04-29 RX ORDER — WARFARIN SODIUM 5 MG/1
5 TABLET ORAL
Status: DISCONTINUED | OUTPATIENT
Start: 2018-04-29 | End: 2018-04-29

## 2018-04-29 RX ADMIN — FLUOXETINE 20 MG: 20 CAPSULE ORAL at 22:24

## 2018-04-29 RX ADMIN — INSULIN LISPRO 3 UNITS: 100 INJECTION, SOLUTION INTRAVENOUS; SUBCUTANEOUS at 13:15

## 2018-04-29 RX ADMIN — INSULIN GLARGINE 10 UNITS: 100 INJECTION, SOLUTION SUBCUTANEOUS at 22:25

## 2018-04-29 RX ADMIN — ATORVASTATIN CALCIUM 20 MG: 20 TABLET, FILM COATED ORAL at 18:24

## 2018-04-29 RX ADMIN — OXYCODONE HYDROCHLORIDE 5 MG: 5 TABLET ORAL at 14:12

## 2018-04-29 RX ADMIN — DOCUSATE SODIUM 100 MG: 100 CAPSULE, LIQUID FILLED ORAL at 18:24

## 2018-04-29 RX ADMIN — FENOFIBRATE 145 MG: 145 TABLET, FILM COATED ORAL at 09:06

## 2018-04-29 RX ADMIN — OXYCODONE HYDROCHLORIDE 10 MG: 10 TABLET ORAL at 04:39

## 2018-04-29 RX ADMIN — OXYCODONE HYDROCHLORIDE 5 MG: 5 TABLET ORAL at 18:30

## 2018-04-29 RX ADMIN — INSULIN LISPRO 6 UNITS: 100 INJECTION, SOLUTION INTRAVENOUS; SUBCUTANEOUS at 09:08

## 2018-04-29 RX ADMIN — OXYCODONE HYDROCHLORIDE 10 MG: 10 TABLET ORAL at 09:07

## 2018-04-29 RX ADMIN — LEVETIRACETAM 500 MG: 500 TABLET ORAL at 09:07

## 2018-04-29 RX ADMIN — INSULIN LISPRO 4 UNITS: 100 INJECTION, SOLUTION INTRAVENOUS; SUBCUTANEOUS at 13:15

## 2018-04-29 RX ADMIN — LEVETIRACETAM 500 MG: 500 TABLET ORAL at 18:24

## 2018-04-29 RX ADMIN — WARFARIN SODIUM 6 MG: 6 TABLET ORAL at 18:24

## 2018-04-29 RX ADMIN — Medication 1 TABLET: at 09:06

## 2018-04-29 RX ADMIN — INSULIN LISPRO 8 UNITS: 100 INJECTION, SOLUTION INTRAVENOUS; SUBCUTANEOUS at 22:26

## 2018-04-29 RX ADMIN — INSULIN LISPRO 2 UNITS: 100 INJECTION, SOLUTION INTRAVENOUS; SUBCUTANEOUS at 18:25

## 2018-04-29 RX ADMIN — INSULIN LISPRO 3 UNITS: 100 INJECTION, SOLUTION INTRAVENOUS; SUBCUTANEOUS at 18:25

## 2018-04-29 RX ADMIN — DOCUSATE SODIUM 100 MG: 100 CAPSULE, LIQUID FILLED ORAL at 09:07

## 2018-04-29 RX ADMIN — INSULIN LISPRO 3 UNITS: 100 INJECTION, SOLUTION INTRAVENOUS; SUBCUTANEOUS at 09:09

## 2018-04-29 RX ADMIN — LEVOTHYROXINE SODIUM 75 MCG: 75 TABLET ORAL at 06:09

## 2018-04-29 NOTE — PHYSICAL THERAPY NOTE
PHYSICAL THERAPY NOTE          Patient Name: Jayda Spain  IWFVI'P Date: 4/29/2018 04/29/18 0827   Pain Assessment   Pain Assessment 0-10   Pain Score 8   Pain Type Surgical pain   Pain Location Hip   Pain Orientation Left   Pain Descriptors Aching   Pain Frequency Constant/continuous   Pain Onset Ongoing   Clinical Progression Gradually improving   Effect of Pain on Daily Activities increased pain with activity    Patient's Stated Pain Goal No pain   Hospital Pain Intervention(s) Ambulation/increased activity;Repositioned   Response to Interventions tolerated    Precautions   Total Hip Replacement ADduction; Internal rotation; Flexion   Restrictions/Precautions   Weight Bearing Precautions Per Order Yes   LLE Weight Bearing Per Order WBAT   Other Precautions THR;WBS;Multiple lines;Telemetry; Fall Risk;Pain  (posterolateral hip precautions )   General   Chart Reviewed Yes   Additional Pertinent History Pts BP in supine: 123/63, Sitting /60; Pt with no complaints of dizzinesss or lightheadedness with standing or ambulation    Response to Previous Treatment Patient with no complaints from previous session  Family/Caregiver Present No   Cognition   Overall Cognitive Status WFL   Arousal/Participation Alert   Attention Within functional limits   Orientation Level Oriented to person;Oriented to place;Oriented to time   Memory Within functional limits   Following Commands Follows all commands and directions without difficulty   Subjective   Subjective willing to participate in PT    Bed Mobility   Supine to Sit 4  Minimal assistance   Additional items Assist x 1; Increased time required;Verbal cues   Sit to Supine 4  Minimal assistance   Additional items Assist x 1; Increased time required;Verbal cues   Transfers   Sit to Stand 4  Minimal assistance   Additional items Assist x 1; Increased time required;Verbal cues   Stand to Sit 4 Minimal assistance   Additional items Assist x 1; Increased time required;Verbal cues   Additional Comments VC needed for hand placement and safety with transfers    Ambulation/Elevation   Gait pattern Excessively slow; Short stride; Foward flexed   Gait Assistance 4  Minimal assist   Additional items Assist x 1   Assistive Device Rolling walker   Distance 35ft    Balance   Static Sitting Fair +   Static Standing Fair   Ambulatory Fair -   Endurance Deficit   Endurance Deficit Yes   Endurance Deficit Description fatigue and pain    Activity Tolerance   Activity Tolerance Patient limited by fatigue;Patient limited by pain   Nurse Made Aware pt appropriate to be seen per Ratna Vasques RN   Exercises   THR Sitting;10 reps;AROM; Bilateral  (x 2 sets )   Assessment   Prognosis Good   Problem List Decreased strength;Decreased range of motion;Decreased endurance; Impaired balance;Decreased mobility; Decreased safety awareness;Pain;Orthopedic restrictions   Assessment Pt resting comfortably in bed at time of PT session  Pt reports feeling sleepy however is willing to participate in PT  Pt able to perform all bed mobility and transfers with slightly less A required compared to previous session, however continues to need cueing for safety  Pt remains slightly impulsive with transfers and ambulation at this time and needs TC and VC for safety  Pt educated on hip precautions prior to mobilization and was able to recall 2/3 precautions  Pt able to tolerate increased ambulation distances, however is limited by pain and fatigue at this time  VC required for proper gait sequence with RW at this time  Pt denies any lightheadedness or dizziness with ambulation  BP taken during treatment session and are documented above  Pt able to perform all LE therex seated EOB and denies any new complaints, however pt continues to require TC for proper form and pacing with all therex   Pt assisted back into chair at conclusion of PT session with all needs within reach  Pt left with OT at conclusion of treatment session  Pt denies any further questions at this time  PT will continue to follow  DC recommendation when medically cleared is home PT  Barriers to Discharge Inaccessible home environment   Goals   Patient Goals " to go home"   [facility] Expiration Date 05/04/18   Treatment Day 2   Plan   Treatment/Interventions Functional transfer training;LE strengthening/ROM; Elevations; Therapeutic exercise; Endurance training;Patient/family training;Equipment eval/education; Bed mobility;Gait training;Spoke to nursing;OT   Progress Progressing toward goals   PT Frequency 7x/wk; Twice a day   Recommendation   Recommendation Home PT   Equipment Recommended Walker  (RW, commode )   PT - OK to Discharge No  (need to increase ambulation and attempt steps )   Gabby Adams, PT

## 2018-04-29 NOTE — PLAN OF CARE
Problem: PHYSICAL THERAPY ADULT  Goal: Performs mobility at highest level of function for planned discharge setting  See evaluation for individualized goals  Treatment/Interventions: Functional transfer training, LE strengthening/ROM, Elevations, Therapeutic exercise, Endurance training, Patient/family training, Bed mobility, Gait training, Spoke to nursing, OT, Family  Equipment Recommended: Daniel Irwin (Comment) (RW & BSC; pt has RW )       See flowsheet documentation for full assessment, interventions and recommendations  Outcome: Progressing  Prognosis: Good  Problem List: Decreased strength, Decreased range of motion, Decreased endurance, Impaired balance, Decreased mobility, Decreased safety awareness, Pain, Orthopedic restrictions  Assessment: Pt resting comfortably in bed at time of PT session  Pt reports feeling sleepy however is willing to participate in PT  Pt able to perform all bed mobility and transfers with slightly less A required compared to previous session, however continues to need cueing for safety  Pt remains slightly impulsive with transfers and ambulation at this time and needs TC and VC for safety  Pt educated on hip precautions prior to mobilization and was able to recall 2/3 precautions  Pt able to tolerate increased ambulation distances, however is limited by pain and fatigue at this time  VC required for proper gait sequence with RW at this time  Pt denies any lightheadedness or dizziness with ambulation  BP taken during treatment session and are documented above  Pt able to perform all LE therex seated EOB and denies any new complaints, however pt continues to require TC for proper form and pacing with all therex  Pt assisted back into chair at conclusion of PT session with all needs within reach  Pt left with OT at conclusion of treatment session  Pt denies any further questions at this time  PT will continue to follow   DC recommendation when medically cleared is home PT  Barriers to Discharge: Inaccessible home environment     Recommendation: Home PT     PT - OK to Discharge: No (need to increase ambulation and attempt steps )    See flowsheet documentation for full assessment

## 2018-04-29 NOTE — PLAN OF CARE
Problem: OCCUPATIONAL THERAPY ADULT  Goal: Performs self-care activities at highest level of function for planned discharge setting  See evaluation for individualized goals  Treatment Interventions: ADL retraining, Functional transfer training, UE strengthening/ROM, Endurance training, Cognitive reorientation, Patient/family training, Equipment evaluation/education, Compensatory technique education, Activityengagement, Energy conservation  Equipment Recommended: Bedside commode       See flowsheet documentation for full assessment, interventions and recommendations  Outcome: Progressing  Limitation: Decreased ADL status, Decreased UE strength, Decreased Safe judgement during ADL, Decreased endurance, Decreased cognition, Decreased self-care trans, Decreased high-level ADLs  Prognosis: Good  Assessment: Pt seen for OT treatment session this AM focusing on functional activity tolerance, bed mobility, ADLs w/ use of LH AE, and functional mobility/transfers  Pt alert and cooperative throughout session  Pt lying supine upon entering room  BP measured to be 123/63 while supine  Pt able to state 2/3 THPs at start of session, therefore requiring education on THPs w/ pt verbalizing understanding of all education  Min A required for supine>sit 2* increased assist required for LE management  BP measured to be 122/60 while seated EOB w/ pt reporting no lightheadedness or dizziness  Transfers (sit<>stand, RW<>BSC) completed at a Min A level 2* assist required to initiate forward weight shift for sit>stand, assist for controlled descent to surface for stand>sit, and cues for technique and hand/operative leg placement  Functional mobility completed at a Min A level w/ assist for steadying/balance  Toileting tasks completed w/ Min A 2* assist required for steadying/balance when standing for perineal hygiene 2* decreased dynamic standing balance  ADLs completed while seated OOB in chair   Educated pt on available LH AE (sock aid, reacher, LH sponge, LH shoehorn, dressing stick, etc ) to ensure adherence to THPs during ADLs and increase independence in ADLs w/ pt verbalizing understanding of education  Light grooming tasks (wash/dry face and hands) completed w/ Supervision 2* setup required and increased time to complete  UB bathing/dressing completed at a Supervision level 2* setup required and increased time to complete tasks  LB bathing completed w/ Mod A 2* assist required to wash B/L lower legs and feet and CGA required when standing to wash buttocks and perineal area 2* decreased dynamic standing balance  Anticipate w/ use of LH sponge, pt able to perform LB ADLs w/ Min A only  LB dressing completed w/ Mod A  Pt demonstrated ability to don/doff B/L socks w/ Supervision w/ use of sock aid and dressing stick  Mod A required to don/doff underwear and pants 2* decreased carryover of education of LH AE (reacher, dressing stick), therefore requiring increased cues for technique and assist to thread B/L LEs into pants/underwear 2* increased frustration w/ task  Pt reports her  is available to assist w/ [address] AE upon return home, but she would like further practice w/ equipment  Educated pt on Hip Kit being available for purchase in Pharmacy  Recommend additional training w/ LH AE 2* decreased carryover demonstrated  Continue to recommend Home OT at this time        OT Discharge Recommendation: Home OT  OT - OK to Discharge: Yes (when medically stable)

## 2018-04-29 NOTE — PHYSICAL THERAPY NOTE
PHYSICAL THERAPY NOTE          Patient Name: Krissy Nath  ICWKT'N Date: 4/29/2018 04/29/18 1442   Pain Assessment   Pain Assessment 0-10   Pain Score 8   Pain Type Surgical pain   Pain Location Hip   Pain Orientation Left   Pain Descriptors Aching   Pain Frequency Constant/continuous   Pain Onset Ongoing   Clinical Progression Gradually improving   Effect of Pain on Daily Activities increased pain with activity   Patient's Stated Pain Goal No pain   Hospital Pain Intervention(s) Ambulation/increased activity;Repositioned   Response to Interventions tolerated    Precautions   Total Hip Replacement ADduction; Internal rotation; Flexion   Restrictions/Precautions   Weight Bearing Precautions Per Order Yes   LLE Weight Bearing Per Order WBAT   Other Precautions THR;WBS;Multiple lines; Fall Risk;Pain  (posterolateral hip precautions )   General   Chart Reviewed Yes   Additional Pertinent History Pts BP sitting /70   Response to Previous Treatment Patient with no complaints from previous session  Family/Caregiver Present No   Cognition   Overall Cognitive Status Impaired   Arousal/Participation Alert   Attention Within functional limits   Orientation Level Oriented to person;Oriented to place;Oriented to time   Memory Decreased recall of precautions   Following Commands Follows one step commands without difficulty   Subjective   Subjective willing to participate in PT    Bed Mobility   Supine to Sit 4  Minimal assistance   Additional items Assist x 1; Increased time required;Verbal cues;LE management   Sit to Supine 4  Minimal assistance   Additional items Assist x 1; Increased time required;Verbal cues;LE management   Transfers   Sit to Stand 4  Minimal assistance   Additional items Assist x 1; Increased time required;Verbal cues   Stand to Sit 4  Minimal assistance   Additional items Assist x 1; Increased time required;Verbal cues   Additional Comments VC needed for hand placement and safety    Ambulation/Elevation   Gait pattern Step to;Short stride; Foward flexed; Inconsistent jose; Improper Weight shift   Gait Assistance 4  Minimal assist   Additional items Assist x 1   Assistive Device Rolling walker   Distance 15ft x 2, 10 ft x 2   (limited by fatigue and pain )   Balance   Static Sitting Fair +   Static Standing Fair   Ambulatory Poor +   Endurance Deficit   Endurance Deficit Yes   Endurance Deficit Description fatigue and pain   Activity Tolerance   Activity Tolerance Patient limited by fatigue;Patient limited by pain   Nurse Made Aware pt appropriate to be seen per Narciso Bearden RN   Exercises   THR Sitting;10 reps;AROM; Bilateral  (x 2 sets )   Assessment   Prognosis Good   Problem List Decreased strength;Decreased range of motion;Decreased endurance; Impaired balance;Decreased mobility;Pain;Orthopedic restrictions;Decreased safety awareness   Assessment Pt resting comfortably in bed at time of PT session  Pt reports feeling more fatigued and tired, but willing to participate in PT  Pt able to perform all bed mobility and transfers this session, however continues to require min A for all mobility as well as physical assistance for proper weight shifts during sit <--> stand transfers  Pt remains impulsive with transfers and needs constant cueing to maintain hip precautions during mobility  Pt reeducated on 3/3 hip precautions during treatment session prior to mobilization  Pt performed all LE therex seated EOB without any increase in complaints, however pt requires constant VC and TC for proper form, pacing as well as to redirect attention  Pt progressed ambulation distances this session, however remain limited by fatigue with seated rest break needed between ambulation attempts  Pt continues to require VC for proper gait sequence when utilizing RW   Pt declined to sit OOB in chair at conclusion of PT session, despite education on the importance of being OOB during hospital stay  Pt assisted back into bed at conclusion of PT session with all needs within reach  Pt denies any further questions at this time  PT will continue to follow  DC recommendation when medically cleared is rehab vs home PT pending progress with PT  Per pts chart, pts  had recent brain surgery and is not able to care for pt at home at d/c     Barriers to Discharge Inaccessible home environment   Goals   Patient Goals " to go home"   STG Expiration Date 05/04/18   Treatment Day 3   Plan   Treatment/Interventions Functional transfer training;LE strengthening/ROM; Elevations; Therapeutic exercise; Endurance training;Patient/family training;Equipment eval/education; Bed mobility;Gait training;Spoke to nursing   Progress Progressing toward goals   PT Frequency 7x/wk; Twice a day   Recommendation   Recommendation Short-term skilled PT; Home PT  (rehab vs home PT pending progress with PT )   Equipment Recommended Walker  (RW, marshall )   PT - OK to Discharge No  (need to increase ambulation tolerance and attempt steps )   Zita Euceda, PT

## 2018-04-29 NOTE — PLAN OF CARE
Problem: PHYSICAL THERAPY ADULT  Goal: Performs mobility at highest level of function for planned discharge setting  See evaluation for individualized goals  Treatment/Interventions: Functional transfer training, LE strengthening/ROM, Elevations, Therapeutic exercise, Endurance training, Patient/family training, Bed mobility, Gait training, Spoke to nursing, OT, Family  Equipment Recommended: Cornelio Claros, Other (Comment) (RW & BSC; pt has RW )       See flowsheet documentation for full assessment, interventions and recommendations  Outcome: Progressing  Prognosis: Good  Problem List: Decreased strength, Decreased range of motion, Decreased endurance, Impaired balance, Decreased mobility, Pain, Orthopedic restrictions, Decreased safety awareness  Assessment: Pt resting comfortably in bed at time of PT session  Pt reports feeling more fatigued and tired, but willing to participate in PT  Pt able to perform all bed mobility and transfers this session, however continues to require min A for all mobility as well as physical assistance for proper weight shifts during sit <--> stand transfers  Pt remains impulsive with transfers and needs constant cueing to maintain hip precautions during mobility  Pt reeducated on 3/3 hip precautions during treatment session prior to mobilization  Pt performed all LE therex seated EOB without any increase in complaints, however pt requires constant VC and TC for proper form, pacing as well as to redirect attention  Pt progressed ambulation distances this session, however remain limited by fatigue with seated rest break needed between ambulation attempts  Pt continues to require VC for proper gait sequence when utilizing RW  Pt declined to sit OOB in chair at conclusion of PT session, despite education on the importance of being OOB during hospital stay  Pt assisted back into bed at conclusion of PT session with all needs within reach  Pt denies any further questions at this time   PT will continue to follow  DC recommendation when medically cleared is rehab vs home PT pending progress with PT  Per pts chart, pts  had recent brain surgery and is not able to care for pt at home at d/c    Barriers to Discharge: Inaccessible home environment     Recommendation: Short-term skilled PT, Home PT (rehab vs home PT pending progress with PT )     PT - OK to Discharge: No (need to increase ambulation tolerance and attempt steps )    See flowsheet documentation for full assessment

## 2018-04-29 NOTE — PROGRESS NOTES
Pt's POA Winston Halon called  She is concerned w/ pt's hx that pt is not truly safe to go home   had recent brain surgery and is not able to care for pt  POA is hoping for rehab at d/c  Adv her that this note would be entered to chart for consideration

## 2018-04-29 NOTE — PLAN OF CARE
Problem: Potential for Falls  Goal: Patient will remain free of falls  INTERVENTIONS:  - Assess patient frequently for physical needs  -  Identify cognitive and physical deficits and behaviors that affect risk of falls    -  Erwin fall precautions as indicated by assessment   - Educate patient/family on patient safety including physical limitations  - Instruct patient to call for assistance with activity based on assessment  - Modify environment to reduce risk of injury  - Consider OT/PT consult to assist with strengthening/mobility   Outcome: Progressing      Problem: DISCHARGE PLANNING - CARE MANAGEMENT  Goal: Discharge to post-acute care or home with appropriate resources  INTERVENTIONS:  - Conduct assessment to determine patient/family and health care team treatment goals, and need for post-acute services based on payer coverage, community resources, and patient preferences, and barriers to discharge  - Address psychosocial, clinical, and financial barriers to discharge as identified in assessment in conjunction with the patient/family and health care team  - Arrange appropriate level of post-acute services according to patients   needs and preference and payer coverage in collaboration with the physician and health care team  - Communicate with and update the patient/family, physician, and health care team regarding progress on the discharge plan  - Arrange appropriate transportation to post-acute venues   Outcome: Progressing      Problem: PAIN - ADULT  Goal: Verbalizes/displays adequate comfort level or baseline comfort level  Interventions:  - Encourage patient to monitor pain and request assistance  - Assess pain using appropriate pain scale  - Administer analgesics based on type and severity of pain and evaluate response  - Implement non-pharmacological measures as appropriate and evaluate response  - Consider cultural and social influences on pain and pain management  - Notify physician/advanced practitioner if interventions unsuccessful or patient reports new pain  Outcome: Progressing

## 2018-04-29 NOTE — OCCUPATIONAL THERAPY NOTE
[address] Progress Note     Patient Name: Payton Chairez  SEVWC'C Date: 4/29/2018  Problem List  Patient Active Problem List   Diagnosis    History of stroke    Insulin dependent diabetes mellitus ([facility] )    Hypertension    Hypothyroidism    Aneurysm of basilar artery (HCC)    Seizure ([facility] )    PFO (patent foramen ovale)    Pre-operative clearance    Primary osteoarthritis of left hip         04/29/18 0855   Restrictions/Precautions   Weight Bearing Precautions Per Order Yes   LLE Weight Bearing Per Order WBAT   Other Precautions THR;WBS; Telemetry;Multiple lines; Fall Risk;Pain  (Posterolateral hip precautions)   Lifestyle   Autonomy per pt independent w/ ADLs, independent w/ functional transfers and mobility w/ RW, assists w/ light meal prep   Reciprocal Relationships spouse   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    Intrinsic Gratification reading magazines and crossword puzzles   Pain Assessment   Pain Assessment 0-10   Pain Score 8   Pain Type Surgical pain   Pain Location Hip   Pain Orientation Left   Pain Descriptors Aching   Pain Frequency Constant/continuous   Pain Onset Ongoing   Clinical Progression Gradually improving   Effect of Pain on Daily Activities Increased pain w/ activity   Patient's Stated Pain Goal No pain   Hospital Pain Intervention(s) Repositioned; Ambulation/increased activity; Emotional support   Response to Interventions Tolerated   ADL   Where Assessed Chair   Grooming Assistance 5  Supervision/Setup   Grooming Deficit Setup;Verbal cueing;Supervision/safety; Increased time to complete   Grooming Comments Light grooming tasks (wash/dry face and hands) completed w/ Supervision 2* setup required and increased time to complete  UB Bathing Assistance 5  Supervision/Setup   UB Bathing Deficit Setup;Supervision/safety; Increased time to complete   UB Bathing Comments UB bathing completed at a Supervision level 2* setup required and increased time to complete tasks  LB Bathing Assistance 3  Moderate Assistance   LB Bathing Deficit Setup;Steadying;Verbal cueing;Supervision/safety; Increased time to complete;Perineal area; Buttocks;Right lower leg including foot; Left lower leg including foot   LB Bathing Comments LB bathing completed w/ Mod A 2* assist required to wash B/L lower legs and feet and CGA required when standing to wash buttocks and perineal area 2* decreased dynamic standing balance  Anticipate w/ use of LH sponge, pt able to perform LB ADLs w/ Min A only  UB Dressing Assistance 5  Supervision/Setup   UB Dressing Deficit Setup;Supervision/safety; Increased time to complete   UB Dressing Comments UB dressing completed at a Supervision level 2* setup required and increased time to complete tasks  LB Dressing Assistance 3  Moderate Assistance   LB Dressing Deficit Setup;Steadying; Requires assistive device for steadying;Verbal cueing;Supervision/safety; Increased time to complete; Don/doff R sock; Don/doff L sock; Thread RLE into pants; Thread LLE into pants; Thread RLE into underwear; Thread LLE into underwear;Pull up over hips   LB Dressing Comments LB dressing completed w/ Mod A  Pt demonstrated ability to don/doff B/L socks w/ Supervision w/ use of sock aid and dressing stick  Mod A required to don/doff underwear and pants 2* decreased carryover of education of  AE (reacher, dressing stick), therefore requiring increased cues for technique and assist to thread B/L LEs into pants/underwear 2* increased frustration w/ task  Toileting Assistance  4  Minimal Assistance   Toileting Deficit Setup;Steadying;Verbal cueing;Supervison/safety; Increased time to complete;Perineal hygiene   Toileting Comments Toileting tasks completed w/ Min A 2* assist required for steadying/balance when standing for perineal hygiene 2* decreased dynamic standing balance      Functional Standing Tolerance   Time 3 mins   Activity Functional mobility/transfers   Comments No LOB noted   Bed Mobility   Supine to Sit 4  Minimal assistance   Additional items Assist x 1;Bedrails; Increased time required;Verbal cues;LE management   Sit to Supine Unable to assess  (Pt seated OOB in chair at end of session)   Additional Comments Pt seated OOB in chair at end of session w/ call bell and phone within reach  All needs met and pt reports no further questions for OT at this time   Transfers   Sit to Stand 4  Minimal assistance   Additional items Assist x 1; Increased time required;Verbal cues   Stand to Sit 4  Minimal assistance   Additional items Assist x 1; Armrests; Increased time required;Verbal cues   Stand pivot 4  Minimal assistance   Additional items Assist x 1; Armrests; Increased time required;Verbal cues   Toilet transfer 4  Minimal assistance   Additional items Assist x 1; Armrests; Increased time required;Verbal cues; Commode   Additional Comments Cues for safety and placement of hands and operative leg during transfers   Functional Mobility   Functional Mobility 4  Minimal assistance   Additional Comments Assist x1    Additional items Rolling walker   Toilet Transfers   Toilet Transfer From Rossolini walker   Toilet Transfer Type To and from   Toilet Transfer to Standard bedside commode   Toilet Transfer Technique Stand pivot; Ambulating   Toilet Transfers Minimal assistance   Toilet Transfers Comments Assist x1   Cognition   Overall Cognitive Status Impaired   Arousal/Participation Alert; Cooperative   Attention Within functional limits   Orientation Level Oriented X4   Memory Decreased recall of precautions;Decreased recall of recent events;Decreased short term memory   Following Commands Follows one step commands with increased time or repetition   Additional Activities   Additional Activities Other (Comment)  (education of LH AE)   Additional Activities Comments Educated pt on available LH AE (sock aid, reacher, LH sponge, LH shoehorn, dressing stick, etc ) to ensure adherence to THPs during ADLs and increase independence in ADLs w/ pt verbalizing understanding of education  Decreased carryover noted during ADLs, therefore recommend further training w/ LH AE   Activity Tolerance   Activity Tolerance Patient limited by fatigue;Patient limited by pain   Medical Staff Made Aware Pt able to be seen per CHANTAL Rubin   Assessment   Assessment Pt seen for OT treatment session this AM focusing on functional activity tolerance, bed mobility, ADLs w/ use of LH AE, and functional mobility/transfers  Pt alert and cooperative throughout session  Pt lying supine upon entering room  BP measured to be 123/63 while supine  Pt able to state 2/3 THPs at start of session, therefore requiring education on THPs w/ pt verbalizing understanding of all education  Min A required for supine>sit 2* increased assist required for LE management  BP measured to be 122/60 while seated EOB w/ pt reporting no lightheadedness or dizziness  Transfers (sit<>stand, RW<>BSC) completed at a Min A level 2* assist required to initiate forward weight shift for sit>stand, assist for controlled descent to surface for stand>sit, and cues for technique and hand/operative leg placement  Functional mobility completed at a Min A level w/ assist for steadying/balance  Toileting tasks completed w/ Min A 2* assist required for steadying/balance when standing for perineal hygiene 2* decreased dynamic standing balance  ADLs completed while seated OOB in chair  Educated pt on available LH AE (sock aid, reacher, LH sponge, LH shoehorn, dressing stick, etc ) to ensure adherence to THPs during ADLs and increase independence in ADLs w/ pt verbalizing understanding of education  Light grooming tasks (wash/dry face and hands) completed w/ Supervision 2* setup required and increased time to complete  UB bathing/dressing completed at a Supervision level 2* setup required and increased time to complete tasks   LB bathing completed w/ Mod A 2* assist required to wash B/L lower legs and feet and CGA required when standing to wash buttocks and perineal area 2* decreased dynamic standing balance  Anticipate w/ use of LH sponge, pt able to perform LB ADLs w/ Min A only  LB dressing completed w/ Mod A  Pt demonstrated ability to don/doff B/L socks w/ Supervision w/ use of sock aid and dressing stick  Mod A required to don/doff underwear and pants 2* decreased carryover of education of LH AE (reacher, dressing stick), therefore requiring increased cues for technique and assist to thread B/L LEs into pants/underwear 2* increased frustration w/ task  Pt reports her  is available to assist w/ LH ADLs upon return home, but she would like further practice w/ equipment  Educated pt on Hip Kit being available for purchase in Pharmacy  Recommend additional training w/ LH AE 2* decreased carryover demonstrated  Continue to recommend Home OT at this time  Plan   Treatment Interventions ADL retraining;Functional transfer training;UE strengthening/ROM; Endurance training;Patient/family training;Equipment evaluation/education; Compensatory technique education;Continued evaluation; Energy conservation; Activityengagement   Goal Expiration Date 05/04/18   Treatment Day 1   OT Frequency 3-5x/wk   Recommendation   OT Discharge Recommendation Home OT   Equipment Recommended Bedside commode   OT - OK to Discharge Yes  (when medically stable)   Barthel Index   Feeding 10   Bathing 0   Grooming Score 5   Dressing Score 5   Bladder Score 10   Bowels Score 10   Toilet Use Score 5   Transfers (Bed/Chair) Score 10   Mobility (Level Surface) Score 0   Stairs Score 0   Barthel Index Score 55   Modified [place] Scale   Modified [place] Scale 4     Yaritza Gomez, OTR/L

## 2018-04-29 NOTE — PROGRESS NOTES
Orthopedic Total Hip Progress Note      Subjective     Status post-Left total hip arthroplasty  Systemic or Specific Complaints: No Complaints    Objective     Vital signs in last 24 hours:  Temp:  [97 2 °F (36 2 °C)-99 1 °F (37 3 °C)] 99 1 °F (37 3 °C)  HR:  [73-87] 78  Resp:  [16-24] 18  BP: ()/(49-82) 118/55    Neurovascular: Capillary refill: Normal  Dorsiflexion: 5/5  Plantarflexion:  5/5   Wound: Dressing:  clean/dry/intact   DVT Exam: No evidence of DVT seen on physical exam   Negative Blair's sign  No cords or calf tenderness  No significant calf/ankle edema  Data Review:  CBC:   Lab Results   Component Value Date    WBC 5 95 04/28/2018    WBC 5 56 09/21/2015    RBC 2 11 (L) 04/28/2018    RBC 4 53 09/21/2015    HGB 9 5 (L) 04/28/2018    HGB 13 3 09/21/2015    HCT 28 8 (L) 04/28/2018    HCT 38 6 09/21/2015     04/28/2018     09/21/2015       Assessment/Plan     Status post-Left total hip arthroplasty: Doing well postoperatively  Postoperative anemia- improved after after 2 units of packed RBC's  Patient will start PT/OT today, was not able to have PT/OT yesterday secondary to anemia   If medically stable and safe with PT/OT it is likely she can be discharged to home    Continues current post-op course    Activity: up with assistance      Lea Henry PA-C    Date: 4/29/2018  Time: 8:22 AM

## 2018-04-29 NOTE — PROGRESS NOTES
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, left hip, failed conservative treatments  Ambulatory dysfunction  Type 2 diabetes mellitus, poor control  Obesity, BMI 29 29  Hypothyroidism  Hypertension  Severe Obstructive sleep apnea, does not use CPAP  Basilar artery aneurysm, since 2015  History of cardiac arrest 2014 required CPR  Complex partial seizures, July 2017, follows Neurology at [address] [place] [address] [address]  Previous history of strokes, 2013, 2016   Acute blood loss anemia, hemoglobin dropped to 6 5 (preoperative hemoglobin done on 04/03/2018 was 13 2)  Post 2 unit PRBC transfusion Hb 9 5  Postoperative surgical hematoma left gluteal area  Anticoagulated with warfarin with INR 1 18 --> 1 13    Recommendation:    Way back in 2015 when patient was seen by the Sleep team, she was noted to have severe sleep apnea  She was advised auto CPAP 6-16 cm of H2 O with C-flex of 3  Unfortunately patient has not been using it and it is very likely that that might be interfering with patient's current condition  I discussed with her about possible complications she can have without using it  Risk of seizures due to reducing seizure threshold related to hypoxemia, cardiac arrhythmias etc were reviewed  Patient will arranged someone to bring her CPAP machine later today from her home  Will start her on CPAP nocturnally at time of her sleep  Part of her lethargic is related to her sleep apnea which is untreated and patient is noncompliant with that  Discontinue telemetry, normal sinus rhythm was noted without any arrhythmias  Her transfusion increase her hemoglobin to 9 5, recheck it tomorrow morning  Increase her Coumadin to 6 mg as her INR is low and subtherapeutic  Minimize narcotic medications    Close monitoring for any breakthrough seizures or any other neuro cardiac complications  She has a large bruise on the operated left hip area which needs to be monitored although it seems to be settling now  Continue postoperative care as per orthopedic service  Subjective:     Patient seen and examined today  EMR and overnight events reviewed  Patient is resting on the site chair  She stated that she is very tired  During my interview also she seemed to be little bit somnolent  Requires tactile stimulation and then she quickly opened her eyes  She indicated of having "mild sleep apnea and not using her CPAP", on further review of her medical records, 12/1/2015 it was noted -    "The polysomnogram was performed as a diagnostic study  The polysomnogram demonstrated severe obstructive sleep apnea  Overall AHI = 64 3 events/hr  Leon oximetry = 69%  During sleep in the supine position, AHI = 56 8 events/hour  No REM sleep was present during the diagnostic polysomnogram  Loud snoring was recorded  TIhe polysomnogram demonstrated very severe restlessness during sleep  In general, the patient would sleep for only a few minutes, then awaken for extended periods of time with marked restlessness, turning back and forth very frequently, during these wake periods  This persisted throughout the night  Cardiac monitoring did not reveal ectopic cardiac beats  In view of the finding of obstructive sleep apnea and the patient's symptoms of daytime sleepiness/fatigue, I recommended home CPAP therapy to the patient  Ms Burleson Ards is interested in proceeding  Therefore, today, I have provided the patient a prescription to receive:    Auto-CPAP 6-16 cm H2O with C-flex of 3 "      Patient had received 2 units of packed RBC transfusion following that she was stabilized overnight telemetry did not reveal any arrhythmias  She has minimal pain on her left operated hip area    She participated with therapy without any symptoms of dizziness or lightheadedness  She feels tired and has no gross bleeding from her surgical site  She denies any pleuritic chest pain or any leg swelling at present  Review of Systems     Constitutional: No chills, diaphoresis, fatigue or fever  Feels weak  HEENT: No sore throat  No visual complaints  Respiratory: No cough, chest tightness, shortness of breath and wheezing    + GINGER and non compliant with CPAP  Cardiovascular: No chest pain and palpitations  No Syncope or grey out  GI: Negative for abdominal distention, pain, constipation, diarrhea or nausea  Endocrine: DM + and takes Lantus and novolog  Genitourinary: Negative for decreased urine volume, dysuria, flank pain and urgency  Skin: Negative for rash  Neurological: Negative for dizziness, weakness, numbness and headaches  Hematological: Negative for spontaneous bruising or bleeding  Psychiatric: Negative for confusion, dysphoric mood, hallucinations  All other systems reviewed and are negative  OBJECTIVE:     Vitals:     HR:  [73-87] 78  Resp:  [16-24] 18  BP: ()/(49-82) 118/55  SpO2:  [91 %-98 %] 93 %  Temp (24hrs), Av 3 °F (36 8 °C), Min:97 2 °F (36 2 °C), Max:99 1 °F (37 3 °C)  Current: Temperature: 99 1 °F (37 3 °C)    Intake/Output Summary (Last 24 hours) at 18 1106  Last data filed at 18 0906   Gross per 24 hour   Intake              925 ml   Output             1025 ml   Net             -100 ml     Body mass index is 29 29 kg/m²  Physical Exam    General Appearance:  Awake,alert, tired , sitting on side chair  Pallor / Icterus/ Cyanosis negative  Oropharynx no thrush  Tongue protrudes in midline  Head:  Normocephalic, atraumatic   Eyes:  No eye redness or discharge bilaterally  Neck: Supple, no raised JVP  Lungs:   B/L Clear to auscultation, no wheezing or rhonchi  Normal broncho-alveolar air entry  No pleural rubs  Heart:   Regular S1S2, A2P2 normal, no murmur  Abdomen:   Soft, central obesity +, non-tender,no rebound  Musculoskeletal: Extremities no cyanosis or edema  Large ecchymosis + left gluteal/ lateral thigh area  Surgical site has clean dressing  No discharge or drainage  Psych: Normal Affect / No hallucinations or delusions  Neurologic:        Skin:  Endocrine:  Vascular: Awake and alert  Mentation intact/ tired appearing  Speech is intact  Facial symmetry is maintained  Muscle strength is 5/5 in all muscle groups except on operated left lower  + large echhymosis left hip  No thyromegaly / no lid lag/ + obesity  Homans sign is negative  B/L Calf are supple and non tender  Labs, Imaging, & Other studies:    All pertinent labs and imaging studies were personally reviewed    Results from last 7 days  Lab Units 04/28/18  1951/18  0837   WBC Thousand/uL  --  5 95   HEMOGLOBIN g/dL 9 5* 6 5*   PLATELETS Thousands/uL  --  207       Results from last 7 days  Lab Units 04/29/18  0427 04/28/18  0456  04/27/18  2122   SODIUM mmol/L 137 138  --  134*   POTASSIUM mmol/L 4 4 4 2  --  5 1   CHLORIDE mmol/L 104 105  --  100   CO2 mmol/L 24 24  --  19*   ANION GAP mmol/L 9 9  --  15*   BUN mg/dL 29* 35*  --  32*   CREATININE mg/dL 0 78 0 89  --  1 42*   EGFR ml/min/1 73sq m 79 67  --  38   GLUCOSE RANDOM mg/dL 217* 122  < > 567*   CALCIUM mg/dL 8 4 8 0*  --  8 4   AST U/L  --   --   --  24   ALT U/L  --   --   --  22   ALK PHOS U/L  --   --   --  57   TOTAL PROTEIN g/dL  --   --   --  5 8*   BILIRUBIN TOTAL mg/dL  --   --   --  0 25   < > = values in this interval not displayed          Current Meds:  Current Facility-Administered Medications   Medication Dose Route Frequency Provider Last Rate Last Dose    acetaminophen (TYLENOL) tablet 650 mg  650 mg Oral Q6H PRN Dyane Seeds, PA-C        acetaminophen (TYLENOL) tablet 975 mg  975 mg Oral Once Prudence Blaosiel, DO        [START ON 4/30/2018] amLODIPine (NORVASC) tablet 2 5 mg  2 5 mg Oral Daily Caryn Shaker Jose Nelson MD        atorvastatin (LIPITOR) tablet 20 mg  20 mg Oral Daily With Sher Traore MD   20 mg at 04/28/18 1728    bisacodyl (DULCOLAX) rectal suppository 10 mg  10 mg Rectal Daily PRN Vivi Barger PA-C        calcium carbonate (TUMS) chewable tablet 1,000 mg  1,000 mg Oral Daily PRN Vivi Barger PA-C        docusate sodium (COLACE) capsule 100 mg  100 mg Oral BID Vivi Barger PA-C   100 mg at 04/29/18 4168    fenofibrate (TRICOR) tablet 145 mg  145 mg Oral Daily Louie Segundo MD   145 mg at 04/29/18 0906    FLUoxetine (PROzac) capsule 20 mg  20 mg Oral HS Louie Segundo MD   20 mg at 04/28/18 2147    insulin glargine (LANTUS) subcutaneous injection 10 Units 0 1 mL  10 Units Subcutaneous HS Louie Segundo MD   10 Units at 04/28/18 2145    insulin lispro (HumaLOG) 100 units/mL subcutaneous injection 2-12 Units  2-12 Units Subcutaneous TID [facility] Louie Segundo MD   6 Units at 04/29/18 0908    insulin lispro (HumaLOG) 100 units/mL subcutaneous injection 2-12 Units  2-12 Units Subcutaneous HS Louie Segundo MD   4 Units at 04/28/18 2147    insulin lispro (HumaLOG) 100 units/mL subcutaneous injection 3 Units  3 Units Subcutaneous TID With Meals Louie Segundo MD   3 Units at 04/29/18 0909    ketorolac (TORADOL) injection 15 mg  15 mg Intravenous Q6H PRN Vivi Barger PA-C   15 mg at 04/28/18 0846    lactated ringers infusion  75 mL/hr Intravenous Continuous Louie Segnudo  mL/hr at 04/27/18 1942 100 mL/hr at 04/27/18 1942    levETIRAcetam (KEPPRA) tablet 500 mg  500 mg Oral BID Louie Segundo MD   500 mg at 04/29/18 7942    levothyroxine tablet 75 mcg  75 mcg Oral Early Morning Louie Segundo MD   75 mcg at 04/29/18 0609    [START ON 4/30/2018] lisinopril (ZESTRIL) tablet 5 mg  5 mg Oral Daily Louie Segundo MD        methocarbamol (ROBAXIN) tablet 500 mg  500 mg Oral Q6H PRN Vivi Barger PA-C        metoclopramide (REGLAN) injection 10 mg  10 mg Intravenous Once PRN Za Hernández DO        morphine injection 2 mg  2 mg Intravenous Q2H PRN Isaac Rodriguez PA-C   2 mg at 04/28/18 1620    multivitamin-minerals (CENTRUM) tablet 1 tablet  1 tablet Oral Daily Isaac Rodriguez PA-C   1 tablet at 04/29/18 0906    naloxone (NARCAN) injection 0 04 mg  0 04 mg Intravenous Q1MIN PRN Isaac Rodriguez PA-C        oxyCODONE (ROXICODONE) immediate release tablet 10 mg  10 mg Oral Q4H PRN Isaac Rodriguez PA-C   10 mg at 04/29/18 1862    oxyCODONE (ROXICODONE) IR tablet 5 mg  5 mg Oral Q4H PRN Isaac Rodriguez PA-C   5 mg at 04/27/18 1941    traMADol (ULTRAM) tablet 25 mg  25 mg Oral Q6H PRN Candice Faith MD        warfarin (COUMADIN) tablet 5 mg  5 mg Oral Once (warfarin) Maria Alejandra Mari PA-C         Home Meds:  Prescriptions Prior to Admission   Medication    amLODIPine (NORVASC) 10 mg tablet    atorvastatin (LIPITOR) 20 mg tablet    celecoxib (CeleBREX) 100 mg capsule    clopidogrel (PLAVIX) 75 mg tablet    fenofibrate (TRICOR) 145 mg tablet    FLUoxetine (PROzac) 20 MG tablet    hydrochlorothiazide (HYDRODIURIL) 25 mg tablet    insulin glargine (LANTUS) 100 units/mL subcutaneous injection    insulin lispro (HumaLOG) 100 units/mL injection    levETIRAcetam (KEPPRA) 250 mg tablet    levothyroxine 75 mcg tablet    lisinopril (ZESTRIL) 30 mg tablet    metFORMIN (GLUCOPHAGE) 1000 MG tablet    aspirin 81 mg chewable tablet    Glucosamine-Chondroit-Vit C-Mn (GLUCOSAMINE CHONDR 1500 COMPLX PO)    Omega-3 Fatty Acids (FISH OIL) 1000 MG CPDR         VTE Pharmacologic Prophylaxis: Warfarin (Coumadin) as per Primary Ortho Team   VTE Mechanical Prophylaxis: sequential compression device     Counseling / Coordination of Care    Total time spent today 25 minutes  Greater than 50% of total time was spent with the patient and / or family counseling and / or coordination of care   A description of the counseling / coordination of care: Review of pertinent clinical studies and discussion of treatment plans, and use of CPAP/ tele findings and about her labs  Portions of the record may have been created with voice recognition software  Occasional wrong word or "sound a like" substitutions may have occurred due to the inherent limitations of voice recognition software  Read the chart carefully and recognize, using context, where substitutions have occurred

## 2018-04-30 VITALS
OXYGEN SATURATION: 96 % | DIASTOLIC BLOOD PRESSURE: 71 MMHG | SYSTOLIC BLOOD PRESSURE: 144 MMHG | HEIGHT: 59 IN | WEIGHT: 145 LBS | BODY MASS INDEX: 29.23 KG/M2 | TEMPERATURE: 98.7 F | RESPIRATION RATE: 18 BRPM | HEART RATE: 81 BPM

## 2018-04-30 LAB
ANION GAP SERPL CALCULATED.3IONS-SCNC: 8 MMOL/L (ref 4–13)
ATRIAL RATE: 80 BPM
BUN SERPL-MCNC: 26 MG/DL (ref 5–25)
CALCIUM SERPL-MCNC: 8.8 MG/DL (ref 8.3–10.1)
CHLORIDE SERPL-SCNC: 103 MMOL/L (ref 100–108)
CO2 SERPL-SCNC: 26 MMOL/L (ref 21–32)
CREAT SERPL-MCNC: 0.86 MG/DL (ref 0.6–1.3)
GFR SERPL CREATININE-BSD FRML MDRD: 70 ML/MIN/1.73SQ M
GLUCOSE SERPL-MCNC: 182 MG/DL (ref 65–140)
GLUCOSE SERPL-MCNC: 237 MG/DL (ref 65–140)
GLUCOSE SERPL-MCNC: 261 MG/DL (ref 65–140)
GLUCOSE SERPL-MCNC: 304 MG/DL (ref 65–140)
INR PPP: 1.48 (ref 0.86–1.16)
P AXIS: -10 DEGREES
POTASSIUM SERPL-SCNC: 4.4 MMOL/L (ref 3.5–5.3)
PR INTERVAL: 124 MS
PROTHROMBIN TIME: 18 SECONDS (ref 12.1–14.4)
QRS AXIS: 2 DEGREES
QRSD INTERVAL: 88 MS
QT INTERVAL: 390 MS
QTC INTERVAL: 449 MS
SODIUM SERPL-SCNC: 137 MMOL/L (ref 136–145)
T WAVE AXIS: 4 DEGREES
VENTRICULAR RATE: 80 BPM

## 2018-04-30 PROCEDURE — 82948 REAGENT STRIP/BLOOD GLUCOSE: CPT

## 2018-04-30 PROCEDURE — 85610 PROTHROMBIN TIME: CPT | Performed by: INTERNAL MEDICINE

## 2018-04-30 PROCEDURE — 80048 BASIC METABOLIC PNL TOTAL CA: CPT | Performed by: PHYSICIAN ASSISTANT

## 2018-04-30 PROCEDURE — 97116 GAIT TRAINING THERAPY: CPT

## 2018-04-30 PROCEDURE — 93010 ELECTROCARDIOGRAM REPORT: CPT | Performed by: INTERNAL MEDICINE

## 2018-04-30 PROCEDURE — 97110 THERAPEUTIC EXERCISES: CPT

## 2018-04-30 PROCEDURE — 97530 THERAPEUTIC ACTIVITIES: CPT

## 2018-04-30 RX ORDER — ACETAMINOPHEN 325 MG/1
325 TABLET ORAL EVERY 4 HOURS PRN
Qty: 30 TABLET | Refills: 0
Start: 2018-04-30 | End: 2019-10-09 | Stop reason: HOSPADM

## 2018-04-30 RX ORDER — WARFARIN SODIUM 2 MG/1
6 TABLET ORAL
Refills: 0
Start: 2018-04-30 | End: 2020-09-23 | Stop reason: HOSPADM

## 2018-04-30 RX ORDER — OXYCODONE HYDROCHLORIDE 5 MG/1
5 TABLET ORAL EVERY 4 HOURS PRN
Qty: 30 TABLET | Refills: 0
Start: 2018-04-30 | End: 2019-10-09 | Stop reason: HOSPADM

## 2018-04-30 RX ORDER — DOCUSATE SODIUM 100 MG/1
100 CAPSULE, LIQUID FILLED ORAL 2 TIMES DAILY PRN
Qty: 10 CAPSULE | Refills: 0
Start: 2018-04-30 | End: 2019-10-09 | Stop reason: HOSPADM

## 2018-04-30 RX ORDER — TRAMADOL HYDROCHLORIDE 50 MG/1
25 TABLET ORAL EVERY 6 HOURS PRN
Qty: 30 TABLET | Refills: 0
Start: 2018-04-30 | End: 2019-10-09 | Stop reason: HOSPADM

## 2018-04-30 RX ADMIN — LEVETIRACETAM 500 MG: 500 TABLET ORAL at 08:08

## 2018-04-30 RX ADMIN — LEVETIRACETAM 500 MG: 500 TABLET ORAL at 18:45

## 2018-04-30 RX ADMIN — INSULIN LISPRO 8 UNITS: 100 INJECTION, SOLUTION INTRAVENOUS; SUBCUTANEOUS at 12:03

## 2018-04-30 RX ADMIN — OXYCODONE HYDROCHLORIDE 5 MG: 5 TABLET ORAL at 01:42

## 2018-04-30 RX ADMIN — LEVOTHYROXINE SODIUM 75 MCG: 75 TABLET ORAL at 05:26

## 2018-04-30 RX ADMIN — DOCUSATE SODIUM 100 MG: 100 CAPSULE, LIQUID FILLED ORAL at 18:45

## 2018-04-30 RX ADMIN — LISINOPRIL 5 MG: 5 TABLET ORAL at 08:08

## 2018-04-30 RX ADMIN — OXYCODONE HYDROCHLORIDE 5 MG: 5 TABLET ORAL at 18:42

## 2018-04-30 RX ADMIN — INSULIN LISPRO 6 UNITS: 100 INJECTION, SOLUTION INTRAVENOUS; SUBCUTANEOUS at 18:46

## 2018-04-30 RX ADMIN — FENOFIBRATE 145 MG: 145 TABLET, FILM COATED ORAL at 08:08

## 2018-04-30 RX ADMIN — DOCUSATE SODIUM 100 MG: 100 CAPSULE, LIQUID FILLED ORAL at 08:08

## 2018-04-30 RX ADMIN — Medication 1 TABLET: at 08:08

## 2018-04-30 RX ADMIN — INSULIN LISPRO 3 UNITS: 100 INJECTION, SOLUTION INTRAVENOUS; SUBCUTANEOUS at 18:47

## 2018-04-30 RX ADMIN — OXYCODONE HYDROCHLORIDE 5 MG: 5 TABLET ORAL at 06:31

## 2018-04-30 RX ADMIN — INSULIN LISPRO 4 UNITS: 100 INJECTION, SOLUTION INTRAVENOUS; SUBCUTANEOUS at 08:13

## 2018-04-30 RX ADMIN — INSULIN LISPRO 3 UNITS: 100 INJECTION, SOLUTION INTRAVENOUS; SUBCUTANEOUS at 08:13

## 2018-04-30 RX ADMIN — INSULIN LISPRO 3 UNITS: 100 INJECTION, SOLUTION INTRAVENOUS; SUBCUTANEOUS at 12:03

## 2018-04-30 RX ADMIN — ATORVASTATIN CALCIUM 20 MG: 20 TABLET, FILM COATED ORAL at 18:44

## 2018-04-30 RX ADMIN — OXYCODONE HYDROCHLORIDE 5 MG: 5 TABLET ORAL at 14:12

## 2018-04-30 RX ADMIN — ACETAMINOPHEN 650 MG: 325 TABLET, FILM COATED ORAL at 18:43

## 2018-04-30 NOTE — PLAN OF CARE
Problem: OCCUPATIONAL THERAPY ADULT  Goal: Performs self-care activities at highest level of function for planned discharge setting  See evaluation for individualized goals  Treatment Interventions: ADL retraining, Functional transfer training, UE strengthening/ROM, Endurance training, Cognitive reorientation, Patient/family training, Equipment evaluation/education, Compensatory technique education, Activityengagement, Energy conservation  Equipment Recommended: Bedside commode       See flowsheet documentation for full assessment, interventions and recommendations  Limitation: Decreased ADL status, Decreased UE strength, Decreased Safe judgement during ADL, Decreased endurance, Decreased cognition, Decreased self-care trans, Decreased high-level ADLs  Prognosis: Good  Assessment: Pt seen for skilled OT session focused on toileting, bed mobility and safety  Pt requires MIN assist sit>stand w/ VCs for hand positioning  Pt w/ MOD assist steadying in stance to clean bottom  Pt w/ MOD assist stand pivot transfer w/ RW to bed w/ VCs for positioning and safety  Pt impulsively trying to sit on bed w/o bringing walker  Back w/ her all the way  Pt provided education and cues for UE placement  Pt declined training on [facility] at this time due to increased pain  Pt w/ MIN assist sit>supine bed mobility and assist x2 for repositioning in bed  Pt continues to be limited due to increased pain, impaired balance, impaired activity tolerance, impulsivity, impaired cognition (recall of THPS, impaired safety awareness)  Recommend STR when medically stable  Will continue to follow to address OT goals        OT Discharge Recommendation: Short Term Rehab  OT - OK to Discharge: Yes (to rehab when medically stable)      Comments: Tyler Hankins MS, OTR/L

## 2018-04-30 NOTE — PLAN OF CARE
Problem: Potential for Falls  Goal: Patient will remain free of falls  INTERVENTIONS:  - Assess patient frequently for physical needs  -  Identify cognitive and physical deficits and behaviors that affect risk of falls    -  [place] fall precautions as indicated by assessment   - Educate patient/family on patient safety including physical limitations  - Instruct patient to call for assistance with activity based on assessment  - Modify environment to reduce risk of injury  - Consider OT/PT consult to assist with strengthening/mobility   Outcome: Progressing      Problem: DISCHARGE PLANNING - CARE MANAGEMENT  Goal: Discharge to post-acute care or home with appropriate resources  INTERVENTIONS:  - Conduct assessment to determine patient/family and health care team treatment goals, and need for post-acute services based on payer coverage, community resources, and patient preferences, and barriers to discharge  - Address psychosocial, clinical, and financial barriers to discharge as identified in assessment in conjunction with the patient/family and health care team  - Arrange appropriate level of post-acute services according to patients   needs and preference and payer coverage in collaboration with the physician and health care team  - Communicate with and update the patient/family, physician, and health care team regarding progress on the discharge plan  - Arrange appropriate transportation to post-acute venues   Outcome: Progressing      Problem: PAIN - ADULT  Goal: Verbalizes/displays adequate comfort level or baseline comfort level  Interventions:  - Encourage patient to monitor pain and request assistance  - Assess pain using appropriate pain scale  - Administer analgesics based on type and severity of pain and evaluate response  - Implement non-pharmacological measures as appropriate and evaluate response  - Consider cultural and social influences on pain and pain management  - Notify physician/advanced practitioner if interventions unsuccessful or patient reports new pain   Outcome: Progressing

## 2018-04-30 NOTE — PHYSICAL THERAPY NOTE
PHYSICAL THERAPY NOTE          Patient Name: Anna Guaman  GTTCQ'B Date: 4/30/2018 04/30/18 1042   Pain Assessment   Pain Assessment 0-10   Pain Score 3   Pain Type Surgical pain   Pain Location Hip   Pain Orientation Left   Pain Descriptors Aching   Pain Frequency Constant/continuous   Pain Onset Ongoing   Clinical Progression Gradually improving   Effect of Pain on Daily Activities increased pain with activity    Patient's Stated Pain Goal No pain   Hospital Pain Intervention(s) Ambulation/increased activity;Repositioned   Response to Interventions tolerated    Precautions   Total Hip Replacement ADduction; Internal rotation; Flexion   Restrictions/Precautions   Weight Bearing Precautions Per Order Yes   LLE Weight Bearing Per Order WBAT   Other Precautions THR;WBS;Fall Risk;Pain;Multiple lines  (posterolateral hip precautions )   General   Chart Reviewed Yes   Response to Previous Treatment Patient with no complaints from previous session  Family/Caregiver Present No   Cognition   Overall Cognitive Status Impaired   Arousal/Participation Alert   Attention Within functional limits   Orientation Level Oriented X4   Memory Decreased recall of precautions   Following Commands Follows one step commands without difficulty   Subjective   Subjective willing to participate in PT    Bed Mobility   Supine to Sit 4  Minimal assistance   Additional items Assist x 1; Increased time required;Verbal cues   Sit to Supine 4  Minimal assistance   Additional items Assist x 1; Impulsive; Increased time required;Verbal cues   Transfers   Sit to Stand 4  Minimal assistance   Additional items Assist x 1; Increased time required;Verbal cues   Stand to Sit 4  Minimal assistance   Additional items Assist x 1; Increased time required;Verbal cues   Additional Comments VC needed for hand placement and safety   Pt remians slightly impulsive with transfers at this time    Ambulation/Elevation   Gait pattern Step to;Short stride; Foward flexed; Inconsistent jose; Improper Weight shift   Gait Assistance 4  Minimal assist   Additional items Assist x 1   Assistive Device Rolling walker   Distance 35ft x 2; 15ft x 2   (limited by fatigue and pain )   Balance   Static Sitting Fair +   Static Standing Fair   Ambulatory Poor +   Endurance Deficit   Endurance Deficit Yes   Endurance Deficit Description fatigue and pain    Activity Tolerance   Activity Tolerance Patient limited by fatigue;Patient limited by pain   Nurse Made Aware pt appropriate to be seen per Abi Car, RN   Exercises   THR Sitting;10 reps;AROM; Bilateral  (x 2 sets )   Assessment   Prognosis Good   Problem List Decreased strength;Decreased range of motion;Decreased endurance; Impaired balance;Decreased mobility; Decreased safety awareness;Orthopedic restrictions;Pain   Assessment Pt resting in bed at time of treatment session  Pt reports feeling better today and is willing to participate in PT  Pt able to perform all bed mobility and transfers at this time with Min A x 1 which I unchanged form previous session  Pt continues to be slightly impulsive with all transfers and needs cueing in order for pt to maintain hip precautions with all mobility  Pt able to tolerate increased ambulation distances this session, however continues to be limited by fatigue at this time  Pt continues to require VC for proper gait sequence with RW  Pt with slight LOB noted with ambulation however pt able to self correct with VC  Pt unable to attempt stair negotiation due to fatigue and decreased standing tolerance and ambulation distances  Pt performed all LE therex seated EOB and denies any new complaints, however continues to need VC and TC for proper form and pacing  Pt assisted back to bed at conclusion of PT session with all needs within reach  Pt denies any further questions at this time  PT will continue to follow   DC recommendation when medically cleared is rehab due to decreased functional mobility compared to baseline and increased A needed from caregiver at current time  Barriers to Discharge Inaccessible home environment   Goals   Patient Goals " to get better"   [facility] Expiration Date 05/04/18   Treatment Day 4   Plan   Treatment/Interventions Functional transfer training;LE strengthening/ROM; Elevations; Endurance training; Therapeutic exercise;Patient/family training;Equipment eval/education; Bed mobility;Gait training;Spoke to nursing;Spoke to case management   Progress Slow progress, decreased activity tolerance   PT Frequency 7x/wk; Twice a day   Recommendation   Recommendation Short-term skilled PT   Equipment Recommended Walker  (RW)   PT - OK to Discharge Yes  (to rehab when medically cleared )   Carley Flor, PT

## 2018-04-30 NOTE — OCCUPATIONAL THERAPY NOTE
[address] Progress Note     Patient Name: Maximo Martinez  YZYHJ'E Date: 4/30/2018  Problem List  Patient Active Problem List   Diagnosis    History of stroke    Insulin dependent diabetes mellitus ([facility] )    Hypertension    Hypothyroidism    Aneurysm of basilar artery ([facility] )    Seizure ([facility] )    PFO (patent foramen ovale)    Pre-operative clearance    Primary osteoarthritis of left hip    Obstructive sleep apnea syndrome             04/30/18 1417   Restrictions/Precautions   Weight Bearing Precautions Per Order Yes   LLE Weight Bearing Per Order WBAT   Other Precautions THR; Fall Risk;Pain;Cognitive; Bed Alarm;WBS; Impulsive  (total hip precautions)   Pain Assessment   Pain Assessment 0-10   Pain Score 8   Pain Type Surgical pain;Acute pain   Pain Location Hip   Pain Orientation Posterior; Left   Hospital Pain Intervention(s) Ambulation/increased activity;Repositioned;Cold applied; Emotional support  (RN provided medication)   Response to Interventions tolerated   ADL   Where Assessed Commode   Eating Assistance 5  Supervision/Setup   Eating Deficit Setup   Toileting Assistance  3  Moderate Assistance   Toileting Deficit Setup;Supervison/safety;Verbal cueing; Increased time to complete; Bedside commode;Clothing management up;Clothing management down   Toileting Comments assist to clean bottom w/ MOD assist steadying in stance w/ RW   Functional Standing Tolerance   Time 2 minutes    Activity functional standing tolerance during ADL task   Comments w/ mod assist steadying in stance w/ RW support   Bed Mobility   Sit to Supine 4  Minimal assistance   Additional items Assist x 1; Increased time required;LE management;Verbal cues; Bedrails   Additional Comments cues for safety and maintaining THPS   Transfers   Sit to Stand 4  Minimal assistance   Additional items Assist x 1; Increased time required;Verbal cues;Armrests   Stand to Sit 4  Minimal assistance   Additional items Assist x 1; Increased time required;Verbal cues;Armrests   Stand pivot 3  Moderate assistance   Additional items Assist x 1; Increased time required;Verbal cues;Armrests; Impulsive  (cues to bring walker all the way back to bed w/ her)   Additional Comments VCs for hand placement and positioning of operated LE; implusively standing   Functional Mobility   Functional Mobility 3  Moderate assistance   Additional Comments assist x1 w/ cues for safety and maneuvaring RW   Additional items Rolling walker   Toilet Transfers   Toilet Transfer From Rolling walker   Toilet Transfer to Standard bedside commode   Toilet Transfer Technique Ambulating   Toilet Transfers Moderate assistance   Toilet Transfers Comments assist x1 w/ cues for safety w/ support   Cognition   Overall Cognitive Status Impaired   Arousal/Participation Alert   Attention Within functional limits   Orientation Level Oriented to person;Oriented to place;Oriented to time   Memory Decreased recall of precautions;Decreased short term memory   Following Commands Follows one step commands with increased time or repetition   Comments pt w/ decreased recall to precautions, implusive   Additional Activities   Additional Activities (educated on safety)   Additional Activities Comments pt receptive; pt requires continued education on [facility] reports increased pain wanted to return to bed   Activity Tolerance   Activity Tolerance Patient limited by fatigue;Patient limited by pain   Medical Staff Made Aware appropriate to see per Juan F CORNEJO   Assessment   Assessment Pt seen for skilled OT session focused on toileting, bed mobility and safety  Pt requires MIN assist sit>stand w/ VCs for hand positioning  Pt w/ MOD assist steadying in stance to clean bottom  Pt w/ MOD assist stand pivot transfer w/ RW to bed w/ VCs for positioning and safety  Pt impulsively trying to sit on bed w/o bringing walker  Back w/ her all the way  Pt provided education and cues for UE placement   Pt declined training on [facility] at this time due to increased pain  Pt w/ MIN assist sit>supine bed mobility and assist x2 for repositioning in bed  Pt continues to be limited due to increased pain, impaired balance, impaired activity tolerance, impulsivity, impaired cognition (recall of THPS, impaired safety awareness)  Recommend STR when medically stable  Will continue to follow to address OT goals  Plan   Treatment Interventions ADL retraining;Functional transfer training; Endurance training;Patient/family training;Cognitive reorientation   Goal Expiration Date 05/04/18   Treatment Day 2   OT Frequency 3-5x/wk   Recommendation   OT Discharge Recommendation Short Term Rehab   Equipment Recommended Bedside commode   OT - OK to Discharge Yes  (to rehab when medically stable)   Barthel Index   Feeding 10   Bathing 0   Grooming Score 5   Dressing Score 5   Bladder Score 10   Bowels Score 10   Toilet Use Score 5   Transfers (Bed/Chair) Score 10   Mobility (Level Surface) Score 0   Stairs Score 0   Barthel Index Score 55   Modified Yamil Scale   Modified [place] Scale 4     Documentation completed by: Cristobal Dubois MS, OTR/L

## 2018-04-30 NOTE — PLAN OF CARE
Problem: PHYSICAL THERAPY ADULT  Goal: Performs mobility at highest level of function for planned discharge setting  See evaluation for individualized goals  Treatment/Interventions: Functional transfer training, LE strengthening/ROM, Elevations, Therapeutic exercise, Endurance training, Patient/family training, Bed mobility, Gait training, Spoke to nursing, OT, Family  Equipment Recommended: Tatyana Ospina, Other (Comment) (RW & BSC; pt has RW )       See flowsheet documentation for full assessment, interventions and recommendations  Outcome: Progressing  Prognosis: Good  Problem List: Decreased strength, Decreased range of motion, Decreased endurance, Impaired balance, Decreased mobility, Decreased safety awareness, Orthopedic restrictions, Pain  Assessment: Pt resting in bed at time of PT session  Pt reports feeling very tired this pm, however is willing to participate in PT  Pt able to perform all bed mobility and transfers this session, however pt requires min A x 1 as well as VC and TC for proper form hand placement and safety  Pt remains impulsive with transfers however able to demonstrate increased safety this session compared to previous  Pt able to tolerate increased ambulation distances however continues to require VC and TC for proper gait sequence with RW  Standing rest breaks need during ambulation this session secondary to fatigue  Slight LOB noted with direction change with RW, however pt able to self correct without cueing  Pt performed all LE therex seated EOB this session and denies any new complaints  Pt continues to require VC for proper form and pacing with therex at this time  Pt educated on hip precautions prior to mobilization  Pt assisted back into chair at conclusion of PT session with all needs within reach  Pt denies any further questions at this time  PT will continue to follow  DC recommendation when medically cleared is rehab     Barriers to Discharge: Inaccessible home environment Recommendation: Short-term skilled PT     PT - OK to Discharge: Yes (to rehab when medically cleared )    See flowsheet documentation for full assessment

## 2018-04-30 NOTE — PROGRESS NOTES
Came to see the patient, but patient has been discharged by the primary service to acute rehab  Discussed with the nursing staff  They reported that patient has been stable and has been doing well  Patient has been discharged on Coumadin for DVT prophylaxis per orthopedic service  Patient will need to resume her Plavix once Coumadin is discontinued

## 2018-04-30 NOTE — PHYSICAL THERAPY NOTE
PHYSICAL THERAPY NOTE          Patient Name: Shakira Mercado  TEXSJ'N Date: 4/30/2018 04/30/18 1612   Pain Assessment   Pain Assessment 0-10   Pain Score 8   Pain Type Surgical pain   Pain Location Hip   Pain Orientation Left   Pain Descriptors Aching   Pain Frequency Constant/continuous   Pain Onset Ongoing   Clinical Progression Gradually improving   Effect of Pain on Daily Activities increased pain with activity    Patient's Stated Pain Goal No pain   Hospital Pain Intervention(s) Ambulation/increased activity;Repositioned;Cold applied   Response to Interventions tolerated    Precautions   Total Hip Replacement ADduction; Internal rotation; Flexion   Restrictions/Precautions   Weight Bearing Precautions Per Order Yes   LLE Weight Bearing Per Order WBAT   Other Precautions THR; Fall Risk;Pain; Impulsive;WBS   General   Chart Reviewed Yes   Response to Previous Treatment Patient with no complaints from previous session  Family/Caregiver Present No   Cognition   Overall Cognitive Status Impaired   Arousal/Participation Alert   Attention Within functional limits   Orientation Level Oriented to person;Oriented to place;Oriented to time   Memory Decreased recall of precautions   Following Commands Follows one step commands without difficulty   Subjective   Subjective willing to participate in PT    Bed Mobility   Supine to Sit 4  Minimal assistance   Additional items Assist x 1; Increased time required;Verbal cues   Sit to Supine 4  Minimal assistance   Additional items Assist x 1; Increased time required;Verbal cues   Transfers   Sit to Stand 4  Minimal assistance   Additional items Assist x 1; Increased time required;Verbal cues   Stand to Sit 4  Minimal assistance   Additional items Assist x 1; Increased time required;Verbal cues   Additional Comments VC needed for hand placement and safety    Ambulation/Elevation   Gait pattern Short stride; Step to; Foward flexed; Inconsistent jose   Gait Assistance 4  Minimal assist   Additional items Assist x 1   Assistive Device Rolling walker   Distance 40ft x 2    Balance   Static Sitting Fair +   Static Standing Fair   Ambulatory Poor +   Endurance Deficit   Endurance Deficit Yes   Endurance Deficit Description fatigue and pain    Activity Tolerance   Activity Tolerance Patient limited by fatigue;Patient limited by pain   Nurse Made Aware pt appropriate to be seen per Alex Calderon RN   Exercises   THR Sitting;10 reps;AROM; Bilateral  (x 2 sets )   Assessment   Prognosis Good   Problem List Decreased strength;Decreased range of motion;Decreased endurance; Impaired balance;Decreased mobility; Decreased safety awareness;Orthopedic restrictions;Pain   Assessment Pt resting in bed at time of PT session  Pt reports feeling very tired this pm, however is willing to participate in PT  Pt able to perform all bed mobility and transfers this session, however pt requires min A x 1 as well as VC and TC for proper form hand placement and safety  Pt remains impulsive with transfers however able to demonstrate increased safety this session compared to previous  Pt able to tolerate increased ambulation distances however continues to require VC and TC for proper gait sequence with RW  Standing rest breaks need during ambulation this session secondary to fatigue  Slight LOB noted with direction change with RW, however pt able to self correct without cueing  Pt performed all LE therex seated EOB this session and denies any new complaints  Pt continues to require VC for proper form and pacing with therex at this time  Pt educated on hip precautions prior to mobilization  Pt assisted back into chair at conclusion of PT session with all needs within reach  Pt denies any further questions at this time  PT will continue to follow  DC recommendation when medically cleared is rehab      Barriers to Discharge Inaccessible home environment Goals   Patient Goals " to get better"   [facility] Expiration Date 05/04/18   Treatment Day 5   Plan   Treatment/Interventions Functional transfer training;LE strengthening/ROM; Elevations; Therapeutic exercise; Endurance training;Patient/family training;Equipment eval/education; Bed mobility;Gait training;Spoke to nursing   Progress Slow progress, decreased activity tolerance   PT Frequency 7x/wk; Twice a day   Recommendation   Recommendation Short-term skilled PT   Equipment Recommended Walker   PT - OK to Discharge Yes  (to rehab when medically cleared )   Ellie Casas, PT

## 2018-04-30 NOTE — PLAN OF CARE
Problem: PHYSICAL THERAPY ADULT  Goal: Performs mobility at highest level of function for planned discharge setting  See evaluation for individualized goals  Treatment/Interventions: Functional transfer training, LE strengthening/ROM, Elevations, Therapeutic exercise, Endurance training, Patient/family training, Bed mobility, Gait training, Spoke to nursing, OT, Family  Equipment Recommended: Damion Mu, Other (Comment) (RW & BSC; pt has RW )       See flowsheet documentation for full assessment, interventions and recommendations  Outcome: Progressing  Prognosis: Good  Problem List: Decreased strength, Decreased range of motion, Decreased endurance, Impaired balance, Decreased mobility, Decreased safety awareness, Orthopedic restrictions, Pain  Assessment: Pt resting in bed at time of treatment session  Pt reports feeling better today and is willing to participate in PT  Pt able to perform all bed mobility and transfers at this time with Min A x 1 which I unchanged form previous session  Pt continues to be slightly impulsive with all transfers and needs cueing in order for pt to maintain hip precautions with all mobility  Pt able to tolerate increased ambulation distances this session, however continues to be limited by fatigue at this time  Pt continues to require VC for proper gait sequence with RW  Pt with slight LOB noted with ambulation however pt able to self correct with VC  Pt unable to attempt stair negotiation due to fatigue and decreased standing tolerance and ambulation distances  Pt performed all LE therex seated EOB and denies any new complaints, however continues to need VC and TC for proper form and pacing  Pt assisted back to bed at conclusion of PT session with all needs within reach  Pt denies any further questions at this time  PT will continue to follow   DC recommendation when medically cleared is rehab due to decreased functional mobility compared to baseline and increased A needed from caregiver at current time  Barriers to Discharge: Inaccessible home environment     Recommendation: Short-term skilled PT     PT - OK to Discharge: Yes (to rehab when medically cleared )    See flowsheet documentation for full assessment

## 2018-05-11 NOTE — DISCHARGE SUMMARY
Discharge Summary - Medical Kita Campbell 80 y o  female MRN: [id number]    [facility]  Room / Bed: [place] [address] [address] 201-* Encounter: [id number]    BRIEF OVERVIEW  Admitting Provider: Marcella Ansari MD  Discharge Provider: No att  providers found  Primary Care Physician at Discharge: Pernell Reece -[phone]      Admission Date: 4/27/2018     Discharge Date: 4/30/2018  7:00 PM    Primary Discharge Diagnosis  Principal Problem:    Primary osteoarthritis of left hip  Active Problems:    History of stroke    Insulin dependent diabetes mellitus ([facility] [address] [facility] )    Hypertension    Hypothyroidism    Obstructive sleep apnea syndrome  Resolved Problems:    * No resolved hospital problems  *        Discharge Disposition: Released to SNF/TCU/SNU Facility    Procedure: L ELVIN    Discharge Medications:  See after visit summary for reconciled discharge medications provided to patient and family  Allergies  No Known Allergies      [facility] Course  Patient was transferred to the recovery room post operatively in stable condition  On POD #1 patients vital signs were stable  She  was seen by Physical Therapy and was progressing well  Patient was medically stable  She continued to be orthopaedically and medically stable for the rest of Her inpatient stay and was cleared for discharge on Post Op Day 3      Physical Exam at Discharge  stable

## 2018-05-25 ENCOUNTER — TRANSCRIBE ORDERS (OUTPATIENT)
Dept: PHYSICAL THERAPY | Facility: CLINIC | Age: 68
End: 2018-05-25

## 2018-05-25 ENCOUNTER — EVALUATION (OUTPATIENT)
Dept: PHYSICAL THERAPY | Facility: CLINIC | Age: 68
End: 2018-05-25
Payer: MEDICARE

## 2018-05-25 DIAGNOSIS — Z47.89 SURGICAL AFTERCARE, MUSCULOSKELETAL SYSTEM: ICD-10-CM

## 2018-05-25 DIAGNOSIS — M25.512 LEFT SHOULDER PAIN, UNSPECIFIED CHRONICITY: Primary | ICD-10-CM

## 2018-05-25 DIAGNOSIS — M16.12 PRIMARY OSTEOARTHRITIS OF LEFT HIP: Primary | ICD-10-CM

## 2018-05-25 PROCEDURE — G8978 MOBILITY CURRENT STATUS: HCPCS | Performed by: PHYSICAL THERAPIST

## 2018-05-25 PROCEDURE — G8979 MOBILITY GOAL STATUS: HCPCS | Performed by: PHYSICAL THERAPIST

## 2018-05-25 PROCEDURE — 97110 THERAPEUTIC EXERCISES: CPT | Performed by: PHYSICAL THERAPIST

## 2018-05-25 PROCEDURE — 97162 PT EVAL MOD COMPLEX 30 MIN: CPT | Performed by: PHYSICAL THERAPIST

## 2018-05-25 NOTE — LETTER
May 25, 3089    MD [address] [place] [address]    Patient: Guy Valdes   YOB: 1950   Date of Visit: 2018     Encounter Diagnosis     ICD-10-CM    1  Acute pain of left shoulder M25 512    2  Surgical aftercare, musculoskeletal system Z47 89    3  Primary osteoarthritis of left hip M16 12        Dear Dr Steph Campuzano:    Please review the attached Plan of Care from [facility] recent visit  Please verify that you agree therapy should continue by signing the attached document and sending it back to our office  If you have any questions or concerns, please don't hesitate to call  Sincerely,    Brittany Pappas, PT, DPT, OCS       Referring Provider:      I certify that I have read the below Plan of Care and certify the need for these services furnished under this plan of treatment while under my care  Crystal Granado MD  [facility] 31: [phone]          PT Evaluation     Today's date: 2018  Patient name: Guy Valdes  : 1950  MRN: [id number]  Referring provider: Sandip Leiva MD  Dx:   Encounter Diagnosis     ICD-10-CM    1  Acute pain of left shoulder M25 512    2  Surgical aftercare, musculoskeletal system Z47 89    3  Primary osteoarthritis of left hip M16 12        Start Time: 905  Stop Time:   Total time in clinic (min): 55 minutes    Assessment  Impairments: abnormal gait, abnormal or restricted ROM, activity intolerance, impaired balance, impaired physical strength and pain with function    Assessment details: Pt presents with signs and symptoms synonymous of admitting diagnosis  Pt presents with pain, decreased strength, decreased range, flexibility, as well as tolerance to activity and is a fall risk per objective findings    Pt would benefit skilled PT intervention in order to address these impairments in order to be able to perform all desired activities with minimal to nil symptom exacerbation  Thank you very much for this referral      Understanding of Dx/Px/POC: good   Prognosis: good    Goals  STG 4 Weeks:  Decrease pain at worst to 3/10  Improve range to 115 hip flexion L  Improve strength to hip 4-  Independent with HEP  LTG 8 Weeks:  Decrease pain at worst to 2/10  Improve range to 120 L  Improve strength to hip 4/5 or greater  Able to perform all desired activities with minimal to nil symptom exacerbation      Plan  Planned modality interventions: cryotherapy  Planned therapy interventions: abdominal trunk stabilization, manual therapy, body mechanics training, neuromuscular re-education, postural training, patient education, stretching, strengthening, therapeutic activities, therapeutic exercise, therapeutic training, home exercise program, graded motor, graded exercise, graded activity, gait training, functional ROM exercises, flexibility and balance  Frequency: 2x week  Duration in weeks: 8  Treatment plan discussed with: family and patient        Subjective Evaluation    History of Present Illness  Date of onset: 5/25/2018  Date of surgery: 4/27/2018  Mechanism of injury: surgery  Mechanism of injury: Pt is a 80 yofemale who presents s/p L posterior ELVIN performed by Dr Tawnya Ruiz after a long history of L hip pain with out success of injections  She first met with Dr Tawnya Ruiz about 6 months ago who indicated surgery would likely be best choice after X-ray indicating OA evidence  Pt states that after surgery she was discharged to [place] [address] for 1 week, and then didn't have home care which she thought she was going to  So she has been without proper rehab for 2-3 weeks and presents today stating she has been doing exercises and trying to stay mobile as best she can  Pt reports that she is still having some pain here and there and she reports it will get up to a 5/10 and she does have periods of time without pain    Pt reports that she is able to get up stairs but only 1 at a time and use of railing is evident  Pt reports that her largest goals at this time are to be able to improve her mobility as she does have a history use of cane and RW as needed  Pt reports that she lives with her  who per pt takes care of home, yard, and grocery shopping  Pt reports that she lives with her  with 3 LISSET b/l railing, FF going up stairs where her bedroom is but there is a full bath on the first floor so she only has to go up for changing or sleeping  Pt reports that she has a follow up with her physician in a few months  Denies numbness and tingling or change in bowel or bladder  Quality of life: good    Pain  No pain reported  Current pain ratin  At best pain ratin  At worst pain ratin  Quality: dull ache  Relieving factors: relaxation, rest and medications  Aggravating factors: standing and walking  Progression: improved    Social Support  Steps to enter house: yes  Stairs in house: yes   Lives in: multiple-level home  Lives with: spouse    Employment status: not working (retired)    Diagnostic Tests  X-ray: abnormal (Hip OA)  Treatments  Previous treatment: physical therapy and medication  Current treatment: medication and physical therapy  Discharged from (in last 30 days): skilled nursing facility  Patient Goals  Patient goals for therapy: decreased edema, decreased pain, improved balance, increased motion, increased strength and return to sport/leisure activities          Objective     Active Range of Motion   Left Hip   Flexion: 110 degrees     Right Hip   Flexion: 120 degrees     Additional Active Range of Motion Details  Sensation intact to light touch L3,4,5,S1,S2  Incision: 8cm clean dry intact no scabbing evident     LE Screen: strong and painless  Hip MMT:  L flex 3, abd 3, ext 3+, add 4-  R flex 4-, abd 3+, ext 3+, add 4  STs:   Transfers: Sit <> stand, UE use b/l, good awareness  Supine <> Sit slow but mod I     TUG with SPC: 18 19" with SPC, Genu Valgus R > L, Gait, significant antalgia with L IC, SPC in R UE  Flowsheet Rows      Most Recent Value   PT/OT G-Codes   Current Score  68   Projected Score  78   Assessment Type  Evaluation   G code set  Mobility: Walking & Moving Around   Mobility: Walking and Moving Around Current Status ()  CK   Mobility: Walking and Moving Around Goal Status ()  CI          Precautions: Falls Risk, Hx of MI, Basilar Artery Aneursym, DM2, HTN, Hypothyroidism, >1yr Seizure hx        Daily Treatment Diary       Manual 5/25                                                                             Exercise Diary             Bike             HR/TR UE supp 2 x 10            Bridges 3" x 10            Sit to stands CG 2 x 5            Hip Abd Stand UE supp 1 x 10            Side Steps             Mini Squat UE support             Step up + Down 4-6"             Tband Row + Ext   GTB          Hip Flexion standing UE support             AP PA walking    This visit                                                                                                                                            Modalities             CP prn to L hip

## 2018-05-25 NOTE — PROGRESS NOTES
PT Evaluation     Today's date: 2018  Patient name: Kwan Rojas  : 1950  MRN: [id number]  Referring provider: Deondre Gentile MD  Dx:   Encounter Diagnosis     ICD-10-CM    1  Primary osteoarthritis of left hip M16 12    2  Surgical aftercare, musculoskeletal system Z47 89        Start Time: 905  Stop Time: 1000  Total time in clinic (min): 55 minutes    Assessment  Impairments: abnormal gait, abnormal or restricted ROM, activity intolerance, impaired balance, impaired physical strength and pain with function    Assessment details: Pt presents with signs and symptoms synonymous of admitting diagnosis  Pt presents with pain, decreased strength, decreased range, flexibility, as well as tolerance to activity and is a fall risk per objective findings  Pt would benefit skilled PT intervention in order to address these impairments in order to be able to perform all desired activities with minimal to nil symptom exacerbation    Thank you very much for this referral      Understanding of Dx/Px/POC: good   Prognosis: good    Goals  STG 4 Weeks:  Decrease pain at worst to 3/10  Improve range to 115 hip flexion L  Improve strength to hip 4-  Independent with HEP  LTG 8 Weeks:  Decrease pain at worst to 2/10  Improve range to 120 L  Improve strength to hip 4/5 or greater  Able to perform all desired activities with minimal to nil symptom exacerbation      Plan  Planned modality interventions: cryotherapy  Planned therapy interventions: abdominal trunk stabilization, manual therapy, body mechanics training, neuromuscular re-education, postural training, patient education, stretching, strengthening, therapeutic activities, therapeutic exercise, therapeutic training, home exercise program, graded motor, graded exercise, graded activity, gait training, functional ROM exercises, flexibility and balance  Frequency: 2x week  Duration in weeks: 8  Treatment plan discussed with: family and patient        Subjective Evaluation    History of Present Illness  Date of onset: 2018  Date of surgery: 2018  Mechanism of injury: surgery  Mechanism of injury: Pt is a 80 yofemale who presents s/p L posterior ELVIN performed by Dr Hiram Lockhart after a long history of L hip pain with out success of injections  She first met with Dr Hiram Lockhart about 6 months ago who indicated surgery would likely be best choice after X-ray indicating OA evidence  Pt states that after surgery she was discharged to [facility] for 1 week, and then didn't have home care which she thought she was going to  So she has been without proper rehab for 2-3 weeks and presents today stating she has been doing exercises and trying to stay mobile as best she can  Pt reports that she is still having some pain here and there and she reports it will get up to a 5/10 and she does have periods of time without pain  Pt reports that she is able to get up stairs but only 1 at a time and use of railing is evident  Pt reports that her largest goals at this time are to be able to improve her mobility as she does have a history use of cane and RW as needed  Pt reports that she lives with her  who per pt takes care of home, yard, and grocery shopping  Pt reports that she lives with her  with 3 LISSET b/l railing, FF going up stairs where her bedroom is but there is a full bath on the first floor so she only has to go up for changing or sleeping  Pt reports that she has a follow up with her physician in a few months  Denies numbness and tingling or change in bowel or bladder      Quality of life: good    Pain  No pain reported  Current pain ratin  At best pain ratin  At worst pain ratin  Quality: dull ache  Relieving factors: relaxation, rest and medications  Aggravating factors: standing and walking  Progression: improved    Social Support  Steps to enter house: yes  Stairs in house: yes   Lives in: multiple-level home  Lives with: spouse    Employment status: not working (retired)    Diagnostic Tests  X-ray: abnormal (Hip OA)  Treatments  Previous treatment: physical therapy and medication  Current treatment: medication and physical therapy  Discharged from (in last 30 days): skilled nursing facility  Patient Goals  Patient goals for therapy: decreased edema, decreased pain, improved balance, increased motion, increased strength and return to sport/leisure activities          Objective     Active Range of Motion   Left Hip   Flexion: 110 degrees     Right Hip   Flexion: 120 degrees     Additional Active Range of Motion Details  Sensation intact to light touch L3,4,5,S1,S2  Incision: 8cm clean dry intact no scabbing evident  LE Screen: strong and painless  Hip MMT:  L flex 3, abd 3, ext 3+, add 4-  R flex 4-, abd 3+, ext 3+, add 4  STs:   Transfers: Sit <> stand, UE use b/l, good awareness  Supine <> Sit slow but mod I     TUG with SPC: 18 19" with SPC, Genu Valgus R > L, Gait, significant antalgia with L IC, SPC in R UE  Flowsheet Rows      Most Recent Value   PT/OT G-Codes   Current Score  68   Projected Score  78   Assessment Type  Evaluation   G code set  Mobility: Walking & Moving Around   Mobility: Walking and Moving Around Current Status ()  CK   Mobility: Walking and Moving Around Goal Status ()  CI          Precautions: Falls Risk, Hx of MI, Basilar Artery Aneursym, DM2, HTN, Hypothyroidism, >1yr Seizure hx        Daily Treatment Diary       Manual 5/25                                                                             Exercise Diary             Bike             HR/TR UE supp 2 x 10            Bridges 3" x 10            Sit to stands CG 2 x 5            Hip Abd Stand UE supp 1 x 10            Side Steps             Mini Squat UE support             Step up + Down 4-6"             Tband Row + Ext   GTB          Hip Flexion standing UE support             AP PA walking    This visit Modalities             CP prn to L hip

## 2018-05-30 ENCOUNTER — OFFICE VISIT (OUTPATIENT)
Dept: PHYSICAL THERAPY | Facility: CLINIC | Age: 68
End: 2018-05-30
Payer: MEDICARE

## 2018-05-30 DIAGNOSIS — M16.12 PRIMARY OSTEOARTHRITIS OF LEFT HIP: Primary | ICD-10-CM

## 2018-05-30 DIAGNOSIS — Z47.89 SURGICAL AFTERCARE, MUSCULOSKELETAL SYSTEM: ICD-10-CM

## 2018-05-30 PROCEDURE — 97110 THERAPEUTIC EXERCISES: CPT | Performed by: PHYSICAL THERAPIST

## 2018-05-30 NOTE — PROGRESS NOTES
Daily Note     Today's date: 2018  Patient name: Shu Rascon  : 1950  MRN: [id number]  Referring provider: Paramjit Silva MD  Dx:   Encounter Diagnosis     ICD-10-CM    1  Primary osteoarthritis of left hip M16 12    2  Surgical aftercare, musculoskeletal system Z47 89                   Subjective: Pt presents today stating that her hip has been doing well and has had no pain within the last few days  She reports that she is able to walk around grocery store using  cane but does not use the cane while at home  Pt is compliant with HEP  Objective: See treatment diary below      Assessment: Progressed CKC exercises without pain symptoms  Pt tolerated treatment well but fatigued at the completion of sit to stands, walking, and standing hip flexion due to decrease of endurance  Will continue to progress safely within precautions of posterior ELVIN and pt will continue to benefit from skilled PT to increase LE strength, ROM, endurance, and trunk stability  Precautions: Falls Risk, Hx of MI, Basilar Artery Aneursym, DM2, HTN, Hypothyroidism, >1yr Seizure hx        Daily Treatment Diary       Manual                                                                             Exercise Diary             Bike  5mins           HR/TR UE supp 2 x 10 2x10           Bridges 3" x 10 3"x20           Sit to stands CG 2 x 5 2x5           Hip Abd Stand UE supp 1 x 10 1x10           Side Steps  2laps           Mini Squat UE support  nv           Step up + Down 4-6"             Tband Row + Ext  nv GTB          Hip Flexion standing UE support  2x10           AP PA walking    This visit          LAQ  2# x10           Walking  1 lap                                                                                                                   Modalities             CP prn to L hip

## 2018-06-04 ENCOUNTER — OFFICE VISIT (OUTPATIENT)
Dept: PHYSICAL THERAPY | Facility: CLINIC | Age: 68
End: 2018-06-04
Payer: MEDICARE

## 2018-06-04 DIAGNOSIS — M16.12 PRIMARY OSTEOARTHRITIS OF LEFT HIP: Primary | ICD-10-CM

## 2018-06-04 DIAGNOSIS — Z47.89 SURGICAL AFTERCARE, MUSCULOSKELETAL SYSTEM: ICD-10-CM

## 2018-06-04 PROCEDURE — 97110 THERAPEUTIC EXERCISES: CPT | Performed by: PHYSICAL THERAPIST

## 2018-06-04 PROCEDURE — 97116 GAIT TRAINING THERAPY: CPT | Performed by: PHYSICAL THERAPIST

## 2018-06-04 NOTE — PROGRESS NOTES
Daily Note     Today's date: 2018  Patient name: Veronica Early  : 1950  MRN: [id number]  Referring provider: Leo Colindres MD  Dx:   Encounter Diagnosis     ICD-10-CM    1  Primary osteoarthritis of left hip M16 12    2  Surgical aftercare, musculoskeletal system Z47 89                   Subjective: Pt presents today stating that she has not had pain in her hip in the last few weeks  She reports that she only walks around the house without the use of her cane  Objective: See treatment diary below      Assessment: Progressed reps of all strength exercises, sit to stands, and duration of walking  Provided tactile and verbal cue to properly perform mini squats  Pt tolerated and increase in treatment well without pain  Upon next visit continue to progress walking laps within tolerance to improve endurance and LE strength  Precautions: Falls Risk, Hx of MI, Basilar Artery Aneursym, DM2, HTN, Hypothyroidism, >1yr Seizure hx        Daily Treatment Diary       Manual                                                                            Exercise Diary             Bike  5mins 6mins          HR/TR UE supp 2 x 10 2x10 3x10          Bridges 3" x 10 3"x20 3"x20          Sit to stands CG 2 x 5 2x5 3x5          Hip Abd Stand UE supp 1 x 10 1x10 2x10          Side Steps  2laps 3 laps          Mini Squat UE support  nv 2-3x10          Step up + Down 4-6"             Tband Row + Ext  nv GTB 2x10          Hip Flexion standing UE support  2x10 3x10          AP PA walking    This visit          LAQ  2# x10 1 5# 3x10          Walking  1 lap 2 laps          SLR   2x10                                                                                                     Modalities             CP prn to L hip

## 2018-06-06 ENCOUNTER — OFFICE VISIT (OUTPATIENT)
Dept: PHYSICAL THERAPY | Facility: CLINIC | Age: 68
End: 2018-06-06
Payer: MEDICARE

## 2018-06-06 DIAGNOSIS — M16.12 PRIMARY OSTEOARTHRITIS OF LEFT HIP: Primary | ICD-10-CM

## 2018-06-06 DIAGNOSIS — Z47.89 SURGICAL AFTERCARE, MUSCULOSKELETAL SYSTEM: ICD-10-CM

## 2018-06-06 PROCEDURE — 97116 GAIT TRAINING THERAPY: CPT | Performed by: PHYSICAL THERAPIST

## 2018-06-06 PROCEDURE — 97110 THERAPEUTIC EXERCISES: CPT | Performed by: PHYSICAL THERAPIST

## 2018-06-06 NOTE — PROGRESS NOTES
Daily Note     Today's date: 2018  Patient name: Kita Campbell  : 1950  MRN: [id number]  Referring provider: Douglas Williamson MD  Dx:   Encounter Diagnosis     ICD-10-CM    1  Primary osteoarthritis of left hip M16 12    2  Surgical aftercare, musculoskeletal system Z47 89                   Subjective: Pt presents today stating that she is still only walking around the house without her cane and has not felt pain within the last few weeks  She reports that she is still taking it easy until she feels better with walking  Objective: See treatment diary below      Assessment: Progressed walking laps and steps ups to continue to improve LE strength and endurance  Pt tolerated treatment well without fatigue at end of treatment  Pt had poor eccentric control as well as required tactile and verbal cues to properly perform steps  N v continue to improve safety of steps within patient tolerance  Precautions: Falls Risk, Hx of MI, Basilar Artery Aneursym, DM2, HTN, Hypothyroidism, >1yr Seizure hx        Daily Treatment Diary       Manual                                                                           Exercise Diary             Bike  5mins 6mins 6mins         HR/TR UE supp 2 x 10 2x10 3x10 3x10         Bridges 3" x 10 3"x20 3"x20 3"x20         Sit to stands CG 2 x 5 2x5 3x5 3x5         Hip Abd Stand UE supp 1 x 10 1x10 2x10 2x10         Side Steps  2laps 3 laps 3laps         Mini Squat UE support  nv 2-3x10 2x10         Step up + Down 4-6"    x10         Tband Row + Ext  nv GTB 2x10 [id number]         Hip Flexion standing UE support  2x10 3x10 3x10         AP PA walking    3laps          LAQ  2# x10 1 5# 3x10 1 5#3x10         Walking  1 lap 2 laps 3laps         SLR   2x10 2x10                                                                                                    Modalities             CP prn to L hip

## 2018-06-11 ENCOUNTER — OFFICE VISIT (OUTPATIENT)
Dept: PHYSICAL THERAPY | Facility: CLINIC | Age: 68
End: 2018-06-11
Payer: MEDICARE

## 2018-06-11 DIAGNOSIS — Z47.89 SURGICAL AFTERCARE, MUSCULOSKELETAL SYSTEM: ICD-10-CM

## 2018-06-11 DIAGNOSIS — M16.12 PRIMARY OSTEOARTHRITIS OF LEFT HIP: Primary | ICD-10-CM

## 2018-06-11 PROCEDURE — 97110 THERAPEUTIC EXERCISES: CPT | Performed by: PHYSICAL THERAPIST

## 2018-06-11 PROCEDURE — G8979 MOBILITY GOAL STATUS: HCPCS | Performed by: PHYSICAL THERAPIST

## 2018-06-11 PROCEDURE — G8978 MOBILITY CURRENT STATUS: HCPCS | Performed by: PHYSICAL THERAPIST

## 2018-06-11 PROCEDURE — 97112 NEUROMUSCULAR REEDUCATION: CPT | Performed by: PHYSICAL THERAPIST

## 2018-06-11 PROCEDURE — 97116 GAIT TRAINING THERAPY: CPT | Performed by: PHYSICAL THERAPIST

## 2018-06-11 NOTE — PROGRESS NOTES
Daily Note     Today's date: 2018  Patient name: Gabriel Palmer  : 1950  MRN: [id number]  Referring provider: Chase Galvez MD  Dx:   Encounter Diagnosis     ICD-10-CM    1  Primary osteoarthritis of left hip M16 12    2  Surgical aftercare, musculoskeletal system Z47 89                   Subjective: Pt presents today stating that she is doing okay and this weekend went well  She is having less discomfort in her hip with her HEP  She reports that her  does the shopping so she has not been walking in the community unless needed  Objective: See treatment diary below      Assessment: Pt continued walking laps without use of cane to improve endurance  Progressed balance intervention providing CG and verbal cues to properly perform NBOS activities  Increased reps and resistance of side steps and standing hip abd  Reduced steps due to pt fatigue at end of session  Pt will benefit from skilled PT to continue to improve LE strength and endurance to return to desired leisure activities  Precautions: Falls Risk, Hx of MI, Basilar Artery Aneursym, DM2, HTN, Hypothyroidism, >1yr Seizure hx        Daily Treatment Diary       Manual                                                                          Exercise Diary             Bike  5mins 6mins 6mins 6mins        HR/TR UE supp 2 x 10 2x10 3x10 3x10 3x10        Bridges 3" x 10 3"x20 3"x20 3"x20 3"x20        Sit to stands CG 2 x 5 2x5 3x5 3x5 2x10        Hip Abd Stand UE supp 1 x 10 1x10 2x10 2x10 1# 2x10        Side Steps  2laps 3 laps 3laps 3 laps YTB        Mini Squat UE support  nv 2-3x10 2x10 2x10        Step up + Down 4-6"    x10 x5        Tband Row + Ext  nv GTB 2x10 [id number] nv        Hip Flexion standing UE support  2x10 3x10 3x10 3x10        AP PA walking    3laps  3 laps        LAQ  2# x10 1 5# 3x10 1 5#3x10 2# 2x10        Walking  1 lap 2 laps 3laps 3 laps        SLR   2x10 2x10 2x10        SLS     10"x10        NBOS EC     5x15"        NBOS EO Foam     5x15"                                                            Modalities             CP prn to L hip

## 2018-06-13 ENCOUNTER — OFFICE VISIT (OUTPATIENT)
Dept: PHYSICAL THERAPY | Facility: CLINIC | Age: 68
End: 2018-06-13
Payer: MEDICARE

## 2018-06-13 DIAGNOSIS — Z47.89 SURGICAL AFTERCARE, MUSCULOSKELETAL SYSTEM: ICD-10-CM

## 2018-06-13 DIAGNOSIS — M16.12 PRIMARY OSTEOARTHRITIS OF LEFT HIP: Primary | ICD-10-CM

## 2018-06-13 PROCEDURE — 97116 GAIT TRAINING THERAPY: CPT | Performed by: PHYSICAL THERAPIST

## 2018-06-13 PROCEDURE — 97112 NEUROMUSCULAR REEDUCATION: CPT | Performed by: PHYSICAL THERAPIST

## 2018-06-13 PROCEDURE — 97110 THERAPEUTIC EXERCISES: CPT | Performed by: PHYSICAL THERAPIST

## 2018-06-13 NOTE — PROGRESS NOTES
Daily Note     Today's date: 2018  Patient name: Guy Valdes  : 1950  MRN: [id number]  Referring provider: Sandip Leiva MD  Dx:   Encounter Diagnosis     ICD-10-CM    1  Primary osteoarthritis of left hip M16 12    2  Surgical aftercare, musculoskeletal system Z47 89                   Subjective: Pt presents today stating that she is doing okay and she was able to plant flowers in her garden without any pain  She also reports that everyday she is able to be without her cane for longer periods of time around the house  Objective: See treatment diary below      Assessment: Progressed NBOS exercise duration with minimal tactile cues  Increased reps and resistance for SLR and LAQ with no exacerbation of pain  Pt requires max verbal cues to properly perform step down  Consider progressing balance intervention and walking laps within pt tolerance  Pt will benefit from skilled PT to improve LE strength, endurance, and trunk stabilization  Precautions: Falls Risk, Hx of MI, Basilar Artery Aneursym, DM2, HTN, Hypothyroidism, >1yr Seizure hx        Daily Treatment Diary       Manual                                                                         Exercise Diary             Bike  5mins 6mins 6mins 6mins 6mins       HR/TR UE supp 2 x 10 2x10 3x10 3x10 3x10 3x10       Bridges 3" x 10 3"x20 3"x20 3"x20 3"x20 3"x20       Sit to stands CG 2 x 5 2x5 3x5 3x5 2x10 2x10       Hip Abd Stand UE supp 1 x 10 1x10 2x10 2x10 1# 2x10 1#2x10       Side Steps  2laps 3 laps 3laps 3 laps YTB 3 laps YTB       Mini Squat UE support  nv 2-3x10 2x10 2x10 2x10       Step up + Down 4-6"    x10 x5 x10       Tband Row + Ext  nv GTB 2x10 [id number] nv RTB 2x10       Hip Flexion standing UE support  2x10 3x10 3x10 3x10 3x10       AP PA walking    3laps  3 laps 4laps       LAQ  2# x10 1 5# 3x10 1 5#3x10 2# 2x10 2# 2x10       Walking  1 lap 2 laps 3laps 3 laps 4laps       SLR   2x10 2x10 2x10 2x10 1# SLS     10"x10 10"x10       NBOS EC     5x15" 5x15"       NBOS EO Foam     5x15" 5x15"                                                           Modalities             CP prn to L hip

## 2018-06-18 ENCOUNTER — OFFICE VISIT (OUTPATIENT)
Dept: PHYSICAL THERAPY | Facility: CLINIC | Age: 68
End: 2018-06-18
Payer: MEDICARE

## 2018-06-18 DIAGNOSIS — M16.12 PRIMARY OSTEOARTHRITIS OF LEFT HIP: Primary | ICD-10-CM

## 2018-06-18 DIAGNOSIS — Z47.89 SURGICAL AFTERCARE, MUSCULOSKELETAL SYSTEM: ICD-10-CM

## 2018-06-18 PROCEDURE — 97110 THERAPEUTIC EXERCISES: CPT | Performed by: PHYSICAL THERAPIST

## 2018-06-18 PROCEDURE — 97112 NEUROMUSCULAR REEDUCATION: CPT | Performed by: PHYSICAL THERAPIST

## 2018-06-18 NOTE — PROGRESS NOTES
Daily Note     Today's date: 2018  Patient name: Anna Guaman  : 1950  MRN: [id number]  Referring provider: Candida Cross MD  Dx:   Encounter Diagnosis     ICD-10-CM    1  Primary osteoarthritis of left hip M16 12    2  Surgical aftercare, musculoskeletal system Z47 89                   Subjective: Pt presents today stating that she is stiff today due to sleeping on her L side last night  Pt reports that she was able to go grocery shopping this weekend without pain but was tired towards the end  Objective: See treatment diary below      Assessment:  Increased resistance through entire intervention without exacerbation of pain and minimal fatigue at the end of session  Reduced height of step up/down with proper body mechanics and compensation  Pt is continuing to progress well at this time and will benefit from skilled PT to improve LE strength and tolerance to an increase in activity  Precautions: Falls Risk, Hx of MI, Basilar Artery Aneursym, DM2, HTN, Hypothyroidism, >1yr Seizure hx        Daily Treatment Diary       Manual                                                                        Exercise Diary             Bike  5mins 6mins 6mins 6mins 6mins 6 mins      HR/TR UE supp 2 x 10 2x10 3x10 3x10 3x10 3x10 3x10      Bridges 3" x 10 3"x20 3"x20 3"x20 3"x20 3"x20 3"x20      Sit to stands CG 2 x 5 2x5 3x5 3x5 2x10 2x10 2x10      Hip Abd Stand UE supp 1 x 10 1x10 2x10 2x10 1# 2x10 1#2x10 1 5# 2x10      Side Steps  2laps 3 laps 3laps 3 laps YTB 3 laps YTB 4 laps YTB      Mini Squat UE support  nv 2-3x10 2x10 2x10 2x10 2x10      Step up + Down 4-6"    x10 x5 x10 x15      Tband Row + Ext  nv GTB 2x10 [id number] nv RTB 2x10 RTB 2x10      Hip Flexion standing UE support  2x10 3x10 3x10 3x10 3x10 3x10        AP PA walking    3laps  3 laps 4laps 4 laps      LAQ  2# x10 1 5# 3x10 1 5# 3x10 2# 2x10 2# 2x10 2# 2x10      Walking  1 lap 2 laps 3laps 3 laps 4laps 4 laps SLR   2x10 2x10 2x10 2x10 1# 2x10 1#      SLS     10"x10 10"x10 10"x10      NBOS EC     5x15" 5x15" 5x15"      NBOS EO Foam     5x15" 5x15" 5x15"                                                          Modalities             CP prn to L hip

## 2018-06-20 ENCOUNTER — OFFICE VISIT (OUTPATIENT)
Dept: PHYSICAL THERAPY | Facility: CLINIC | Age: 68
End: 2018-06-20
Payer: MEDICARE

## 2018-06-20 DIAGNOSIS — M16.12 PRIMARY OSTEOARTHRITIS OF LEFT HIP: Primary | ICD-10-CM

## 2018-06-20 DIAGNOSIS — Z47.89 SURGICAL AFTERCARE, MUSCULOSKELETAL SYSTEM: ICD-10-CM

## 2018-06-20 PROCEDURE — 97110 THERAPEUTIC EXERCISES: CPT | Performed by: PHYSICAL THERAPIST

## 2018-06-20 PROCEDURE — 97112 NEUROMUSCULAR REEDUCATION: CPT | Performed by: PHYSICAL THERAPIST

## 2018-06-20 NOTE — PROGRESS NOTES
Daily Note     Today's date: 2018  Patient name: Juan Francisco Redman  : 1950  MRN: [id number]  Referring provider: Vani Petersen MD  Dx:   Encounter Diagnosis     ICD-10-CM    1  Primary osteoarthritis of left hip M16 12    2  Surgical aftercare, musculoskeletal system Z47 89                   Subjective: Pt presents today stating that she is doing okay and is not stiff as she stated last visit  She reports that she continues to not have pain and has not experienced any discomfort since surgery  Objective: See treatment diary below       Assessment:  Continued with outlined intervention  Consider increasing resistance of LE exercises  Progress step height upon nv within pt tolerance as long as proper form persists  Pt will benefit skilled PT to continue to build strength, endurance, and normalize gait pattern  Precautions: Falls Risk, Hx of MI, Basilar Artery Aneursym, DM2, HTN, Hypothyroidism, >1yr Seizure hx        Daily Treatment Diary       Manual                                                                       Exercise Diary             Bike  5mins 6mins 6mins 6mins 6mins 6 mins 6 mins     HR/TR UE supp 2 x 10 2x10 3x10 3x10 3x10 3x10 3x10 3x10     Bridges 3" x 10 3"x20 3"x20 3"x20 3"x20 3"x20 3"x20 3"x20     Sit to stands CG 2 x 5 2x5 3x5 3x5 2x10 2x10 2x10 NP     Hip Abd Stand UE supp 1 x 10 1x10 2x10 2x10 1# 2x10 1#2x10 1 5# 2x10 1# 2x10     Side Steps  2laps 3 laps 3laps 3 laps YTB 3 laps YTB 4 laps YTB 4 laps YTB     Mini Squat UE support  nv 2-3x10 2x10 2x10 2x10 2x10 2x10     Step up + Down 4-6"    x10 x5 x10 x15 x15     Tband Row + Ext  nv GTB 2x10 [id number] nv RTB 2x10 RTB 2x10 RTB 2x10     Hip Flexion standing UE support  2x10 3x10 3x10 3x10 3x10 3x10   3x10     AP PA walking    3laps  3 laps 4laps 4 laps 4 laps     LAQ  2# x10 1 5# 3x10 1 5# 3x10 2# 2x10 2# 2x10 2# 2x10 2# 2x10     Walking  1 lap 2 laps 3laps 3 laps 4laps 4 laps 5 laps     SLR 2x10 2x10 2x10 2x10 1# 2x10 1# 2x101#     SLS     10"x10 10"x10 10"x10 10"x10     NBOS EC     5x15" 5x15" 5x15" 5x15"     NBOS EO Foam     5x15" 5x15" 5x15" 5x15"                                                         Modalities             CP prn to L hip

## 2018-06-25 ENCOUNTER — EVALUATION (OUTPATIENT)
Dept: PHYSICAL THERAPY | Facility: CLINIC | Age: 68
End: 2018-06-25
Payer: MEDICARE

## 2018-06-25 ENCOUNTER — TRANSCRIBE ORDERS (OUTPATIENT)
Dept: PHYSICAL THERAPY | Facility: CLINIC | Age: 68
End: 2018-06-25

## 2018-06-25 DIAGNOSIS — M16.12 PRIMARY OSTEOARTHRITIS OF LEFT HIP: Primary | ICD-10-CM

## 2018-06-25 DIAGNOSIS — Z47.89 SURGICAL AFTERCARE, MUSCULOSKELETAL SYSTEM: ICD-10-CM

## 2018-06-25 PROCEDURE — G8978 MOBILITY CURRENT STATUS: HCPCS | Performed by: PHYSICAL THERAPIST

## 2018-06-25 PROCEDURE — 97110 THERAPEUTIC EXERCISES: CPT | Performed by: PHYSICAL THERAPIST

## 2018-06-25 PROCEDURE — G8979 MOBILITY GOAL STATUS: HCPCS | Performed by: PHYSICAL THERAPIST

## 2018-06-25 PROCEDURE — 97112 NEUROMUSCULAR REEDUCATION: CPT | Performed by: PHYSICAL THERAPIST

## 2018-06-25 NOTE — LETTER
[id number]    Joaquim Rodriguez MD  [address] [place] [address]    Patient: Annmarie Motta   YOB: 1950   Date of Visit: 2018     Encounter Diagnosis     ICD-10-CM    1  Primary osteoarthritis of left hip M16 12    2  Surgical aftercare, musculoskeletal system Z55 80        Dear Dr Beni Beckwith:    Please review the attached Plan of Care from [facility] recent visit  Please verify that you agree therapy should continue by signing the attached document and sending it back to our office  If you have any questions or concerns, please don't hesitate to call  Sincerely,    Manfred Nino, PT, DPT, OCS      Referring Provider:      I certify that I have read the below Plan of Care and certify the need for these services furnished under this plan of treatment while under my care  Joaquim Rodriguez MD  [facility]: [phone]          PT Re-Evaluation     Today's date: 2018  Patient name: Annmarie Motta  : 1950  MRN: [id number]  Referring provider: Elyssa Salguero MD  Dx:   Encounter Diagnosis     ICD-10-CM    1  Primary osteoarthritis of left hip M16 12    2  Surgical aftercare, musculoskeletal system Z47 89        Start Time:   Stop Time:   Total time in clinic (min): 35 minutes    Assessment  Impairments: abnormal gait, abnormal or restricted ROM, activity intolerance, impaired balance, impaired physical strength and pain with function    Assessment details: Pt is progressing well at this time  They have demonstrated improvement in strength, range, pain levels, tolerance to activity however she is still lacking optimal strength and mobility desires and is some what of a fall risk  They have achieved all STGs sought out for them as well as by them    They will benefit continued Skilled PT intervention in order to achieve all LTGs sought out for them as well as by them in order to perform all desired activities with minimal to nil symptom exacerbation  Thank you very much for this kind and motivated referral         Understanding of Dx/Px/POC: good   Prognosis: good    Goals  STG 4 Weeks:  Decrease pain at worst to 3/10 -met  Improve range to 115 hip flexion L -met  Improve strength to hip 4- -partial  Independent with HEP -met  LTG 8 Weeks:  Decrease pain at worst to 2/10  Improve range to 120 L  Improve strength to hip 4/5 or greater  Able to perform all desired activities with minimal to nil symptom exacerbation      Plan  Planned modality interventions: cryotherapy  Planned therapy interventions: abdominal trunk stabilization, manual therapy, body mechanics training, neuromuscular re-education, postural training, patient education, stretching, strengthening, therapeutic activities, therapeutic exercise, therapeutic training, home exercise program, graded motor, graded exercise, graded activity, gait training, functional ROM exercises, flexibility and balance  Frequency: 2x week  Duration in weeks: 8  Treatment plan discussed with: family and patient        Subjective Evaluation    History of Present Illness  Date of onset: 2018  Date of surgery: 2018  Mechanism of injury: surgery  Mechanism of injury: Pt presents today stating that she is very happy with her progression  She states that she would like a bit more endurance and strength as well as try to stop using the [facility] if possible  She denies significant pain, at most maybe 1-2/10  She states she isn't using stairs as much as she would like to and she follows up with her physician on 18      Quality of life: good    Pain  No pain reported  Current pain ratin  At best pain ratin  At worst pain ratin  Quality: dull ache  Relieving factors: relaxation, rest and medications  Aggravating factors: standing and walking  Progression: improved    Social Support  Steps to enter house: yes  Stairs in house: yes Lives in: multiple-level home  Lives with: spouse    Employment status: not working (retired)    Diagnostic Tests  X-ray: abnormal (Hip OA)  Treatments  Previous treatment: physical therapy and medication  Current treatment: medication and physical therapy  Discharged from (in last 30 days): skilled nursing facility  Patient Goals  Patient goals for therapy: decreased edema, decreased pain, improved balance, increased motion, increased strength and return to sport/leisure activities          Objective     Active Range of Motion   Left Hip   Flexion: 115 degrees     Right Hip   Flexion: 120 degrees     Additional Active Range of Motion Details  Sensation intact to light touch L3,4,5,S1,S2  Incision: 8cm clean dry intact no scabbing evident  LE Screen: strong and painless  Hip MMT:  L flex 4-, abd 3+, ext 4, add 4  R flex 4, abd 4, ext 5, add 4+  STs:   Transfers: Sit <> stand, UE use b/l, good awareness  Supine <> Sit slow but mod I     TUG with SPC: 18 19" with SPC, Genu Valgus R > L, Gait, significant antalgia with L IC, SPC in R UE   (11 74" without SPC)        Flowsheet Rows      Most Recent Value   PT/OT G-Codes   Assessment Type  Re-evaluation   G code set  Mobility: Walking & Moving Around   Mobility: Walking and Moving Around Current Status ()  CJ   Mobility: Walking and Moving Around Goal Status ()  CI          Precautions: Falls Risk, Hx of MI, Basilar Artery Aneursym, DM2, HTN, Hypothyroidism, >1yr Seizure hx        Daily Treatment Diary       Manual 5/25 5/30 6/4 6/6 6/11 6/13 6/18 6/20 6/25                                                                     Exercise Diary             Bike  5mins 6mins 6mins 6mins 6mins 6 mins 6 mins 6mins    HR/TR UE supp 2 x 10 2x10 3x10 3x10 3x10 3x10 3x10 3x10 3 x 10    Bridges 3" x 10 3"x20 3"x20 3"x20 3"x20 3"x20 3"x20 3"x20 3" x 20    Sit to stands CG 2 x 5 2x5 3x5 3x5 2x10 2x10 2x10 2 x 10     Hip Abd Stand UE supp 1 x 10 1x10 2x10 2x10 1# 2x10 1#2x10 1 5# 2x10 1# 2x10 2# 2 x 10    Side Steps  2laps 3 laps 3laps 3 laps YTB 3 laps YTB 4 laps YTB 4 laps YTB 4 laps YTB    Mini Squat UE support  nv 2-3x10 2x10 2x10 2x10 2x10 2x10 2 x 10    Step up + Down 4-6"    x10 x5 x10 x15 x15 x 15    Tband Row + Ext  nv GTB 2x10 [id number] nv RTB 2x10 RTB 2x10 RTB 2x10 RTB x 20    Hip Flexion standing UE support  2x10 3x10 3x10 3x10 3x10 3x10   3x10 3 x 10    AP PA walking    3laps  3 laps 4laps 4 laps 4 laps 4 laps    LAQ  2# x10 1 5# 3x10 1 5# 3x10 2# 2x10 2# 2x10 2# 2x10 2# 2x10 2 5# 2x 10    Walking  1 lap 2 laps 3laps 3 laps 4laps 4 laps 5 laps 5 laps    SLR   2x10 2x10 2x10 2x10 1# 2x10 1# 2x101# 2 x 10    SLS     10"x10 10"x10 10"x10 10"x10 10" x 10    NBOS EC     5x15" 5x15" 5x15" 5x15" 5x 15"    NBOS EO Foam     5x15" 5x15" 5x15" 5x15" 5x15"                                                        Modalities             CP prn to L hip

## 2018-06-25 NOTE — PROGRESS NOTES
PT Re-Evaluation     Today's date: 2018  Patient name: Josselin Olivera  : 1950  MRN: [id number]  Referring provider: Julia Jasso MD  Dx:   Encounter Diagnosis     ICD-10-CM    1  Primary osteoarthritis of left hip M16 12    2  Surgical aftercare, musculoskeletal system Z47 89        Start Time: 3825  Stop Time: 1530  Total time in clinic (min): 35 minutes    Assessment  Impairments: abnormal gait, abnormal or restricted ROM, activity intolerance, impaired balance, impaired physical strength and pain with function    Assessment details: Pt is progressing well at this time  They have demonstrated improvement in strength, range, pain levels, tolerance to activity however she is still lacking optimal strength and mobility desires and is some what of a fall risk  They have achieved all STGs sought out for them as well as by them  They will benefit continued Skilled PT intervention in order to achieve all LTGs sought out for them as well as by them in order to perform all desired activities with minimal to nil symptom exacerbation    Thank you very much for this kind and motivated referral         Understanding of Dx/Px/POC: good   Prognosis: good    Goals  STG 4 Weeks:  Decrease pain at worst to 3/10 -met  Improve range to 115 hip flexion L -met  Improve strength to hip 4- -partial  Independent with HEP -met  LTG 8 Weeks:  Decrease pain at worst to 2/10  Improve range to 120 L  Improve strength to hip 4/5 or greater  Able to perform all desired activities with minimal to nil symptom exacerbation      Plan  Planned modality interventions: cryotherapy  Planned therapy interventions: abdominal trunk stabilization, manual therapy, body mechanics training, neuromuscular re-education, postural training, patient education, stretching, strengthening, therapeutic activities, therapeutic exercise, therapeutic training, home exercise program, graded motor, graded exercise, graded activity, gait training, functional ROM exercises, flexibility and balance  Frequency: 2x week  Duration in weeks: 8  Treatment plan discussed with: family and patient        Subjective Evaluation    History of Present Illness  Date of onset: 2018  Date of surgery: 2018  Mechanism of injury: surgery  Mechanism of injury: Pt presents today stating that she is very happy with her progression  She states that she would like a bit more endurance and strength as well as try to stop using the [facility] if possible  She denies significant pain, at most maybe 1-2/10  She states she isn't using stairs as much as she would like to and she follows up with her physician on 18  Quality of life: good    Pain  No pain reported  Current pain ratin  At best pain ratin  At worst pain ratin  Quality: dull ache  Relieving factors: relaxation, rest and medications  Aggravating factors: standing and walking  Progression: improved    Social Support  Steps to enter house: yes  Stairs in house: yes   Lives in: multiple-level home  Lives with: spouse    Employment status: not working (retired)    Diagnostic Tests  X-ray: abnormal (Hip OA)  Treatments  Previous treatment: physical therapy and medication  Current treatment: medication and physical therapy  Discharged from (in last 30 days): skilled nursing facility  Patient Goals  Patient goals for therapy: decreased edema, decreased pain, improved balance, increased motion, increased strength and return to sport/leisure activities          Objective     Active Range of Motion   Left Hip   Flexion: 115 degrees     Right Hip   Flexion: 120 degrees     Additional Active Range of Motion Details  Sensation intact to light touch L3,4,5,S1,S2  Incision: 8cm clean dry intact no scabbing evident     LE Screen: strong and painless  Hip MMT:  L flex 4-, abd 3+, ext 4, add 4  R flex 4, abd 4, ext 5, add 4+  STs:   Transfers: Sit <> stand, UE use b/l, good awareness  Supine <> Sit slow but mod I     TUG with SPC: 18 19" with SPC, Genu Valgus R > L, Gait, significant antalgia with L IC, SPC in R UE   (11 74" without SPC)        Flowsheet Rows      Most Recent Value   PT/OT G-Codes   Assessment Type  Re-evaluation   G code set  Mobility: Walking & Moving Around   Mobility: Walking and Moving Around Current Status ()  CJ   Mobility: Walking and Moving Around Goal Status ()  CI          Precautions: Falls Risk, Hx of MI, Basilar Artery Aneursym, DM2, HTN, Hypothyroidism, >1yr Seizure hx        Daily Treatment Diary       Manual 5/25 5/30 6/4 6/6 6/11 6/13 6/18 6/20 6/25                                                                     Exercise Diary             Bike  5mins 6mins 6mins 6mins 6mins 6 mins 6 mins 6mins    HR/TR UE supp 2 x 10 2x10 3x10 3x10 3x10 3x10 3x10 3x10 3 x 10    Bridges 3" x 10 3"x20 3"x20 3"x20 3"x20 3"x20 3"x20 3"x20 3" x 20    Sit to stands CG 2 x 5 2x5 3x5 3x5 2x10 2x10 2x10 2 x 10     Hip Abd Stand UE supp 1 x 10 1x10 2x10 2x10 1# 2x10 1#2x10 1 5# 2x10 1# 2x10 2# 2 x 10    Side Steps  2laps 3 laps 3laps 3 laps YTB 3 laps YTB 4 laps YTB 4 laps YTB 4 laps YTB    Mini Squat UE support  nv 2-3x10 2x10 2x10 2x10 2x10 2x10 2 x 10    Step up + Down 4-6"    x10 x5 x10 x15 x15 x 15    Tband Row + Ext  nv GTB 2x10 [id number] nv RTB 2x10 RTB 2x10 RTB 2x10 RTB x 20    Hip Flexion standing UE support  2x10 3x10 3x10 3x10 3x10 3x10   3x10 3 x 10    AP PA walking    3laps  3 laps 4laps 4 laps 4 laps 4 laps    LAQ  2# x10 1 5# 3x10 1 5# 3x10 2# 2x10 2# 2x10 2# 2x10 2# 2x10 2 5# 2x 10    Walking  1 lap 2 laps 3laps 3 laps 4laps 4 laps 5 laps 5 laps    SLR   2x10 2x10 2x10 2x10 1# 2x10 1# 2x101# 2 x 10    SLS     10"x10 10"x10 10"x10 10"x10 10" x 10    NBOS EC     5x15" 5x15" 5x15" 5x15" 5x 15"    NBOS EO Foam     5x15" 5x15" 5x15" 5x15" 5x15"                                                        Modalities             CP prn to L hip

## 2018-06-27 ENCOUNTER — OFFICE VISIT (OUTPATIENT)
Dept: PHYSICAL THERAPY | Facility: CLINIC | Age: 68
End: 2018-06-27
Payer: MEDICARE

## 2018-06-27 DIAGNOSIS — Z47.89 SURGICAL AFTERCARE, MUSCULOSKELETAL SYSTEM: ICD-10-CM

## 2018-06-27 DIAGNOSIS — M16.12 PRIMARY OSTEOARTHRITIS OF LEFT HIP: Primary | ICD-10-CM

## 2018-06-27 PROCEDURE — 97110 THERAPEUTIC EXERCISES: CPT | Performed by: PHYSICAL THERAPIST

## 2018-06-27 PROCEDURE — 97112 NEUROMUSCULAR REEDUCATION: CPT | Performed by: PHYSICAL THERAPIST

## 2018-06-27 NOTE — PROGRESS NOTES
Daily Note     Today's date: 2018  Patient name: Anna Crawley  : 1950  MRN: [id number]  Referring provider: Fátima Dela Cruz MD  Dx:   Encounter Diagnosis     ICD-10-CM    1  Primary osteoarthritis of left hip M16 12    2  Surgical aftercare, musculoskeletal system Z47 89                   Subjective: Pt presents today stating that she doing well, no pain or soreness  Objective: See treatment diary below       Assessment:  No tactile cues to perform squats during today's session  Able to enhance reps with OKC activities  Precautions: Falls Risk, Hx of MI, Basilar Artery Aneursym, DM2, HTN, Hypothyroidism, >1yr Seizure hx        Daily Treatment Diary       Manual                                                                     Exercise Diary             Bike  5mins 6mins 6mins 6mins 6mins 6 mins 6 mins 6mins 6 mins   HR/TR UE supp 2 x 10 2x10 3x10 3x10 3x10 3x10 3x10 3x10 3 x 10 3 x 10   Bridges 3" x 10 3"x20 3"x20 3"x20 3"x20 3"x20 3"x20 3"x20 3" x 20    Sit to stands CG 2 x 5 2x5 3x5 3x5 2x10 2x10 2x10 2 x 10  2 x 10   Hip Abd Stand UE supp 1 x 10 1x10 2x10 2x10 1# 2x10 1#2x10 1 5# 2x10 1# 2x10 2# 2 x 10 2X 2 x 10   Side Steps  2laps 3 laps 3laps 3 laps YTB 3 laps YTB 4 laps YTB 4 laps YTB 4 laps YTB 4 lapsRTB   Mini Squat UE support  nv 2-3x10 2x10 2x10 2x10 2x10 2x10 2 x 10 2 x 10   Step up + Down 4-6"    x10 x5 x10 x15 x15 x 15 x 15   Tband Row + Ext  nv GTB 2x10 [id number] nv RTB 2x10 RTB 2x10 RTB 2x10 RTB x 20 RTB x 20   Hip Flexion standing UE support  2x10 3x10 3x10 3x10 3x10 3x10   3x10 3 x 10 3 x 10   AP PA walking    3laps  3 laps 4laps 4 laps 4 laps 4 laps 5 laps   LAQ  2# x10 1 5# 3x10 1 5# 3x10 2# 2x10 2# 2x10 2# 2x10 2# 2x10 2 5# 3x 10 2 5# 3 x10   Walking  1 lap 2 laps 3laps 3 laps 4laps 4 laps 5 laps 5 laps 5 laps   SLR   2x10 2x10 2x10 2x10 1# 2x10 1# 2x101# 2 x 10 2 x 10   SLS     10"x10 10"x10 10"x10 10"x10 10" x 10 10" x 10 NBOS EC     5x15" 5x15" 5x15" 5x15" 5x 15" 5 x 15"   NBOS EO Foam     5x15" 5x15" 5x15" 5x15" 5x15" 5 x 15                                                       Modalities             CP prn to L hip

## 2018-07-03 ENCOUNTER — EVALUATION (OUTPATIENT)
Dept: PHYSICAL THERAPY | Facility: CLINIC | Age: 68
End: 2018-07-03
Payer: MEDICARE

## 2018-07-03 DIAGNOSIS — Z47.89 SURGICAL AFTERCARE, MUSCULOSKELETAL SYSTEM: ICD-10-CM

## 2018-07-03 DIAGNOSIS — M16.12 PRIMARY OSTEOARTHRITIS OF LEFT HIP: Primary | ICD-10-CM

## 2018-07-03 PROCEDURE — 97112 NEUROMUSCULAR REEDUCATION: CPT | Performed by: PHYSICAL THERAPIST

## 2018-07-03 PROCEDURE — G8979 MOBILITY GOAL STATUS: HCPCS | Performed by: PHYSICAL THERAPIST

## 2018-07-03 PROCEDURE — 97110 THERAPEUTIC EXERCISES: CPT | Performed by: PHYSICAL THERAPIST

## 2018-07-03 PROCEDURE — G8978 MOBILITY CURRENT STATUS: HCPCS | Performed by: PHYSICAL THERAPIST

## 2018-07-03 NOTE — PROGRESS NOTES
Daily Note     Today's date: 2018  Patient name: Efraín Gonzales  : 1950  MRN: [id number]  Referring provider: Dayanna Jarvis MD  Dx:   Encounter Diagnosis     ICD-10-CM    1  Primary osteoarthritis of left hip M16 12    2  Surgical aftercare, musculoskeletal system Z47 89        Start Time: 1230  Stop Time: 1320  Total time in clinic (min): 50 minutes    Subjective: Pt presents today stating she is a little stiff but otherwise feeling okay  Offers no complaints  Objective: See treatment diary below       Assessment:  Able to Enhance the resistance with OKC activities today  Sit to stands and mini squats continue to improve at this time  Precautions: Falls Risk, Hx of MI, Basilar Artery Aneursym, DM2, HTN, Hypothyroidism, >1yr Seizure hx        Daily Treatment Diary       Manual 7/3 5/30 6/4 6/6 6/11 6/13 6/18 6/20 6/25 6/27                                                                    Exercise Diary             Bike 6 mins 5mins 6mins 6mins 6mins 6mins 6 mins 6 mins 6mins 6 mins   HR/TR UE supp 2 x 10 2x10 3x10 3x10 3x10 3x10 3x10 3x10 3 x 10 3 x 10   Bridges 3" x 10 3"x20 3"x20 3"x20 3"x20 3"x20 3"x20 3"x20 3" x 20    Sit to stands CG 2 x 5 2x5 3x5 3x5 2x10 2x10 2x10 2 x 10  2 x 10   Hip Abd Stand UE supp 3# 20x 1x10 2x10 2x10 1# 2x10 1#2x10 1 5# 2x10 1# 2x10 2# 2 x 10 2X 2 x 10   Side Steps 4 laps 2laps 3 laps 3laps 3 laps YTB 3 laps YTB 4 laps YTB 4 laps YTB 4 laps YTB 4 lapsRTB   Mini Squat UE support 2 x 10 nv 2-3x10 2x10 2x10 2x10 2x10 2x10 2 x 10 2 x 10   Step up + Down 4-6"    x10 x5 x10 x15 x15 x 15 x 15   Tband Row + Ext GTB x 20 nv GTB 2x10 [id number] nv RTB 2x10 RTB 2x10 RTB 2x10 RTB x 20 RTB x 20   Hip Flexion standing UE support 3 x 10 2x10 3x10 3x10 3x10 3x10 3x10   3x10 3 x 10 3 x 10   AP PA walking 5 laps   3laps  3 laps 4laps 4 laps 4 laps 4 laps 5 laps   LAQ 3# x 20 2# x10 1 5# 3x10 1 5# 3x10 2# 2x10 2# 2x10 2# 2x10 2# 2x10 2 5# 3x 10 2 5# 3 x10   Walking X 1 lap 2 laps 3laps 3 laps 4laps 4 laps 5 laps 5 laps 5 laps   SLR 1#  2x10 2x10 2x10 2x10 1# 2x10 1# 2x101# 2 x 10 2 x 10   SLS 10" x 10    10"x10 10"x10 10"x10 10"x10 10" x 10 10" x 10   NBOS EC 1 min    5x15" 5x15" 5x15" 5x15" 5x 15" 5 x 15"   NBOS EO Foam 1 min    5x15" 5x15" 5x15" 5x15" 5x15" 5 x 15                                                       Modalities             CP prn to L hip

## 2018-07-09 ENCOUNTER — OFFICE VISIT (OUTPATIENT)
Dept: PHYSICAL THERAPY | Facility: CLINIC | Age: 68
End: 2018-07-09
Payer: MEDICARE

## 2018-07-09 DIAGNOSIS — Z47.89 SURGICAL AFTERCARE, MUSCULOSKELETAL SYSTEM: ICD-10-CM

## 2018-07-09 DIAGNOSIS — M16.12 PRIMARY OSTEOARTHRITIS OF LEFT HIP: Primary | ICD-10-CM

## 2018-07-09 PROCEDURE — 97110 THERAPEUTIC EXERCISES: CPT | Performed by: PHYSICAL THERAPIST

## 2018-07-09 NOTE — PROGRESS NOTES
Daily Note     Today's date: 2018  Patient name: Lisseth Herrera  : 1950  MRN: [id number]  Referring provider: Jaguar Silva MD  Dx:   Encounter Diagnosis     ICD-10-CM    1  Primary osteoarthritis of left hip M16 12    2  Surgical aftercare, musculoskeletal system Z47 89                   Subjective: Pt presents today stating she is a little stiff but otherwise feeling okay, she is very tired due to [facility] and doctors visits between her and her   Objective: See treatment diary below       Assessment:  Increase in antalgia with L IC evident during today's session likely correlating with pt's indicated of being busy and very fatigued from all activities over the last few weeks  Precautions: Falls Risk, Hx of MI, Basilar Artery Aneursym, DM2, HTN, Hypothyroidism, >1yr Seizure hx        Daily Treatment Diary       Manual 7/3 7/9 6/4 6/6 6/11 6/13 6/18 6/20 6/25 6/27                                                                    Exercise Diary             Bike 6 mins 6mins 6mins 6mins 6mins 6mins 6 mins 6 mins 6mins 6 mins   HR/TR UE supp 2 x 10 3x 10 3x10 3x10 3x10 3x10 3x10 3x10 3 x 10 3 x 10   Bridges 3" x 10 3"x20 3"x20 3"x20 3"x20 3"x20 3"x20 3"x20 3" x 20    Sit to stands CG 2 x 5 3x5 3x5 3x5 2x10 2x10 2x10 2 x 10  2 x 10   Hip Abd Stand UE supp 3# 20x 1x10 2x10 2x10 1# 2x10 1#2x10 1 5# 2x10 1# 2x10 2# 2 x 10 2X 2 x 10   Side Steps 4 laps 2laps 3 laps 3laps 3 laps YTB 3 laps YTB 4 laps YTB 4 laps YTB 4 laps YTB 4 lapsRTB   Mini Squat UE support 2 x 10 2 x 10 2-3x10 2x10 2x10 2x10 2x10 2x10 2 x 10 2 x 10   Step up + Down 4-6"    x10 x5 x10 x15 x15 x 15 x 15   Tband Row + Ext GTB x 20 GTB 2 x 10 GTB 2x10 [id number] nv RTB 2x10 RTB 2x10 RTB 2x10 RTB x 20 RTB x 20   Hip Flexion standing UE support 3 x 10 2x10 3x10 3x10 3x10 3x10 3x10   3x10 3 x 10 3 x 10   AP PA walking 5 laps 5 laps  3laps  3 laps 4laps 4 laps 4 laps 4 laps 5 laps   LAQ 3# x 20 3# x30 1 5# 3x10 1 5# 3x10 2# 2x10 2# 2x10 2# 2x10 2# 2x10 2 5# 3x 10 2 5# 3 x10   Walking X  2 laps 3laps 3 laps 4laps 4 laps 5 laps 5 laps 5 laps   SLR 1# 1# 2 x 10 2x10 2x10 2x10 2x10 1# 2x10 1# 2x101# 2 x 10 2 x 10   SLS 10" x 10 10" x 10   10"x10 10"x10 10"x10 10"x10 10" x 10 10" x 10   NBOS EC 1 min 1 min   5x15" 5x15" 5x15" 5x15" 5x 15" 5 x 15"   NBOS EO Foam 1 min 1 min   5x15" 5x15" 5x15" 5x15" 5x15" 5 x 15                                                       Modalities             CP prn to L hip

## 2018-07-11 ENCOUNTER — OFFICE VISIT (OUTPATIENT)
Dept: PHYSICAL THERAPY | Facility: CLINIC | Age: 68
End: 2018-07-11
Payer: MEDICARE

## 2018-07-11 DIAGNOSIS — M16.12 PRIMARY OSTEOARTHRITIS OF LEFT HIP: Primary | ICD-10-CM

## 2018-07-11 DIAGNOSIS — Z47.89 SURGICAL AFTERCARE, MUSCULOSKELETAL SYSTEM: ICD-10-CM

## 2018-07-11 PROCEDURE — 97110 THERAPEUTIC EXERCISES: CPT | Performed by: PHYSICAL THERAPIST

## 2018-07-11 PROCEDURE — 97112 NEUROMUSCULAR REEDUCATION: CPT | Performed by: PHYSICAL THERAPIST

## 2018-07-11 NOTE — PROGRESS NOTES
Daily Note     Today's date: 2018  Patient name: Juan Mclean  : 1950  MRN: [id number]  Referring provider: Oumar Srivastava MD  Dx:   Encounter Diagnosis     ICD-10-CM    1  Primary osteoarthritis of left hip M16 12    2  Surgical aftercare, musculoskeletal system Z47 89                   Subjective: Pt presents today stating that she is feeling better today, stating she is rested and tends to move betting in the AM       Objective: See treatment diary below       Assessment:  Far greater tolerance to today's session vs last session  Challenged with EC activities on foam   Perhaps increase band work resistance n v       Precautions: Falls Risk, Hx of MI, Basilar Artery Aneursym, DM2, HTN, Hypothyroidism, >1yr Seizure hx        Daily Treatment Diary       Manual 7/3 7/9 7/11 6/6 6/11 6/13 6/18 6/20 6/25 6/27                                                                    Exercise Diary             Bike 6 mins 6mins 6mins 6mins 6mins 6mins 6 mins 6 mins 6mins 6 mins   HR/TR UE supp 2 x 10 3x 10 3x10 3x10 3x10 3x10 3x10 3x10 3 x 10 3 x 10   Bridges 3" x 10 3"x20 3"x20 3"x20 3"x20 3"x20 3"x20 3"x20 3" x 20    Sit to stands CG 2 x 5 3x5 2 x 10 3x5 2x10 2x10 2x10 2 x 10  2 x 10   Hip Abd Stand UE supp 3# 20x 1x10 3# 2x10 2x10 1# 2x10 1#2x10 1 5# 2x10 1# 2x10 2# 2 x 10 2X 2 x 10   Side Steps 4 laps 2laps 3 laps 3laps 3 laps YTB 3 laps YTB 4 laps YTB 4 laps YTB 4 laps YTB 4 lapsRTB   Mini Squat UE support 2 x 10 2 x 10 2-3x10 2x10 2x10 2x10 2x10 2x10 2 x 10 2 x 10                Tband Row + Ext GTB x 20 GTB 2 x 10 GTB 2x10 [id number] nv RTB 2x10 RTB 2x10 RTB 2x10 RTB x 20 RTB x 20   Hip Flexion standing UE support 3 x 10 2x10 3x10 3x10 3x10 3x10 3x10   3x10 3 x 10 3 x 10   AP PA walking 5 laps 5 laps  3laps  3 laps 4laps 4 laps 4 laps 4 laps 5 laps   LAQ 3# x 20 3# x30 3# 3x10 1 5# 3x10 2# 2x10 2# 2x10 2# 2x10 2# 2x10 2 5# 3x 10 2 5# 3 x10   Walking X   3laps 3 laps 4laps 4 laps 5 laps 5 laps 5 laps   SLR 1# 1# 2 x 10 1# 2x10 2x10 2x10 2x10 1# 2x10 1# 2x101# 2 x 10 2 x 10   SLS 10" x 10 10" x 10 10" x 10  10"x10 10"x10 10"x10 10"x10 10" x 10 10" x 10                NBOS EC Foam   1 min                                                              Modalities             CP prn to L hip

## 2018-07-16 ENCOUNTER — OFFICE VISIT (OUTPATIENT)
Dept: PHYSICAL THERAPY | Facility: CLINIC | Age: 68
End: 2018-07-16
Payer: MEDICARE

## 2018-07-16 DIAGNOSIS — Z47.89 SURGICAL AFTERCARE, MUSCULOSKELETAL SYSTEM: ICD-10-CM

## 2018-07-16 DIAGNOSIS — M16.12 PRIMARY OSTEOARTHRITIS OF LEFT HIP: Primary | ICD-10-CM

## 2018-07-16 PROCEDURE — G8978 MOBILITY CURRENT STATUS: HCPCS

## 2018-07-16 PROCEDURE — 97112 NEUROMUSCULAR REEDUCATION: CPT

## 2018-07-16 PROCEDURE — G8979 MOBILITY GOAL STATUS: HCPCS

## 2018-07-16 PROCEDURE — 97110 THERAPEUTIC EXERCISES: CPT

## 2018-07-16 NOTE — PROGRESS NOTES
Daily Note     Today's date: 2018  Patient name: Anna Guaman  : 1950  MRN: [id number]  Referring provider: Candida Cross MD  Dx:   Encounter Diagnosis     ICD-10-CM    1  Primary osteoarthritis of left hip M16 12    2  Surgical aftercare, musculoskeletal system Z47 89                   Subjective: Patient arrived to therapy without [facility] stating that she forgot the cane at home  No complaints of pain  Objective: See treatment diary below  Precautions: Falls Risk, Hx of MI, Basilar Artery Aneursym, DM2, HTN, Hypothyroidism, >1yr Seizure hx        Daily Treatment Diary       Manual 7/3 7/9 7/11 7/16 6/11 6/13 6/18 6/20 6/25 6/27                                                                    Exercise Diary             Bike 6 mins 6mins 6mins 6 mins 6mins 6mins 6 mins 6 mins 6mins 6 mins   HR/TR UE supp 2 x 10 3x 10 3x10 3x10 3x10 3x10 3x10 3x10 3 x 10 3 x 10   Bridges 3" x 10 3"x20 3"x20 3"x20 3"x20 3"x20 3"x20 3"x20 3" x 20    Sit to stands CG 2 x 5 3x5 2 x 10 3x5 2x10 2x10 2x10 2 x 10  2 x 10   Hip Abd Stand UE supp 3# 20x 1x10 3# 2x10 3# 2x10 1# 2x10 1#2x10 1 5# 2x10 1# 2x10 2# 2 x 10 2X 2 x 10   Side Steps 4 laps 2laps 3 laps RTB x 4 laps 3 laps YTB 3 laps YTB 4 laps YTB 4 laps YTB 4 laps YTB 4 lapsRTB   Mini Squat UE support 2 x 10 2 x 10 2-3x10 2x10 2x10 2x10 2x10 2x10 2 x 10 2 x 10                Tband Row + Ext GTB x 20 GTB 2 x 10 GTB 2x10 GTB 2x10 nv RTB 2x10 RTB 2x10 RTB 2x10 RTB x 20 RTB x 20   Hip Flexion standing UE support 3 x 10 2x10 3x10 3x10 3x10 3x10 3x10   3x10 3 x 10 3 x 10   AP PA walking 5 laps 5 laps  3laps  3 laps 4laps 4 laps 4 laps 4 laps 5 laps   LAQ 3# x 20 3# x30 3# 3x10 3# 3x10 2# 2x10 2# 2x10 2# 2x10 2# 2x10 2 5# 3x 10 2 5# 3 x10   Walking X   X 3 laps 4laps 4 laps 5 laps 5 laps 5 laps   SLR 1# 1# 2 x 10 1# 2x10 1# 2x10 2x10 2x10 1# 2x10 1# 2x101# 2 x 10 2 x 10   SLS 10" x 10 10" x 10 10" x 10 :10x10  10"x10 10"x10 10"x10 10"x10 10" x 10 10" x 10 NBOS EC Foam   1 min                                                              Modalities             CP prn to L hip                                Assessment: Tolerated treatment well  Patient exhibited good technique with therapeutic exercises      Plan: Continue per plan of care

## 2018-07-18 ENCOUNTER — OFFICE VISIT (OUTPATIENT)
Dept: PHYSICAL THERAPY | Facility: CLINIC | Age: 68
End: 2018-07-18
Payer: MEDICARE

## 2018-07-18 DIAGNOSIS — Z47.89 SURGICAL AFTERCARE, MUSCULOSKELETAL SYSTEM: ICD-10-CM

## 2018-07-18 DIAGNOSIS — M16.12 PRIMARY OSTEOARTHRITIS OF LEFT HIP: Primary | ICD-10-CM

## 2018-07-18 PROCEDURE — 97110 THERAPEUTIC EXERCISES: CPT | Performed by: PHYSICAL MEDICINE & REHABILITATION

## 2018-07-18 PROCEDURE — G8980 MOBILITY D/C STATUS: HCPCS | Performed by: PHYSICAL THERAPIST

## 2018-07-18 PROCEDURE — 97112 NEUROMUSCULAR REEDUCATION: CPT | Performed by: PHYSICAL MEDICINE & REHABILITATION

## 2018-07-18 PROCEDURE — G8979 MOBILITY GOAL STATUS: HCPCS | Performed by: PHYSICAL THERAPIST

## 2018-07-18 NOTE — PROGRESS NOTES
Daily Note     Today's date: 2018  Patient name: Kristine Queen  : 1950  MRN: [id number]  Referring provider: Reg Gibbs MD  Dx:   Encounter Diagnosis     ICD-10-CM    1  Primary osteoarthritis of left hip M16 12    2  Surgical aftercare, musculoskeletal system Z47 89                   Subjective: Patient presents without SPC today, notes she is trying to use it as little as possible  Returns to MD tomorrow  Objective: See treatment diary below  Precautions: Falls Risk, Hx of MI, Basilar Artery Aneursym, DM2, HTN, Hypothyroidism, >1yr Seizure hx        Daily Treatment Diary       Manual 7/3 7/9 7/11 7/16 7/18 6/13 6/18 6/20 6/25 6/27                                                                    Exercise Diary             Bike 6 mins 6mins 6mins 6 mins 8' 6mins 6 mins 6 mins 6mins 6 mins   HR/TR UE supp 2 x 10 3x 10 3x10 3x10 3x10 ea 3x10 3x10 3x10 3 x 10 3 x 10   Bridges 3" x 10 3"x20 3"x20 3"x20 20x5" 3"x20 3"x20 3"x20 3" x 20    Sit to stands CG 2 x 5 3x5 2 x 10 3x5 3x5 2x10 2x10 2 x 10  2 x 10   Hip Abd Stand UE supp 3# 20x 1x10 3# 2x10 3# 2x10 S/L 1# 2x10 1#2x10 1 5# 2x10 1# 2x10 2# 2 x 10 2X 2 x 10   Side Steps 4 laps 2laps 3 laps RTB x 4 laps  3 laps YTB 4 laps YTB 4 laps YTB 4 laps YTB 4 lapsRTB   Mini Squat UE support 2 x 10 2 x 10 2-3x10 2x10 2x10 2x10 2x10 2x10 2 x 10 2 x 10                Tband Row + Ext GTB x 20 GTB 2 x 10 GTB 2x10 GTB 2x10 GTB 2x10 ea RTB 2x10 RTB 2x10 RTB 2x10 RTB x 20 RTB x 20   Hip Flexion standing UE support 3 x 10 2x10 3x10 3x10 3x10 3x10 3x10   3x10 3 x 10 3 x 10   AP PA walking 5 laps 5 laps  3laps   4laps 4 laps 4 laps 4 laps 5 laps   LAQ 3# x 20 3# x30 3# 3x10 3# 3x10  2# 2x10 2# 2x10 2# 2x10 2 5# 3x 10 2 5# 3 x10   Walking X   X  4laps 4 laps 5 laps 5 laps 5 laps   SLR 1# 1# 2 x 10 1# 2x10 1# 2x10 1# 3x10 2x10 1# 2x10 1# 2x101# 2 x 10 2 x 10   SLS 10" x 10 10" x 10 10" x 10 :10x10  10x10" 10"x10 10"x10 10"x10 10" x 10 10" x 10                NBOS EC Foam   1 min                                                              Modalities             CP prn to L hip                                Assessment: Tolerated treatment well  Most significant objective challenge with SLR  Patient demonstrated fatigue post treatment and would benefit from continued PT  Plan: Continue per plan of care

## 2018-07-23 ENCOUNTER — APPOINTMENT (OUTPATIENT)
Dept: PHYSICAL THERAPY | Facility: CLINIC | Age: 68
End: 2018-07-23
Payer: MEDICARE

## 2018-07-25 ENCOUNTER — APPOINTMENT (OUTPATIENT)
Dept: PHYSICAL THERAPY | Facility: CLINIC | Age: 68
End: 2018-07-25
Payer: MEDICARE

## 2018-07-30 ENCOUNTER — APPOINTMENT (OUTPATIENT)
Dept: PHYSICAL THERAPY | Facility: CLINIC | Age: 68
End: 2018-07-30
Payer: MEDICARE

## 2018-08-02 NOTE — PROGRESS NOTES
PT Discharge    Today's date: 2018  Patient name: Maximo Martinez  : 1950  MRN: [id number]  Referring provider: Дмитрий Martin MD  Dx:   Encounter Diagnosis     ICD-10-CM    1  Primary osteoarthritis of left hip M16 12    2  Surgical aftercare, musculoskeletal system Z47 89        Start Time: 1520  Stop Time: 1550  Total time in clinic (min): 30 minutes    Assessment/Plan Pt has not been present since 18  Pt's chart will be DC in compliance of facility policy as all Charts are DC within 30 days of last scheduled visit          Subjective    Objective    Flowsheet Rows      Most Recent Value   PT/OT G-Codes   Current Score  57   Projected Score  56

## 2019-01-17 ENCOUNTER — TRANSCRIBE ORDERS (OUTPATIENT)
Dept: LAB | Facility: CLINIC | Age: 69
End: 2019-01-17

## 2019-01-17 ENCOUNTER — APPOINTMENT (OUTPATIENT)
Dept: LAB | Facility: CLINIC | Age: 69
End: 2019-01-17
Payer: MEDICARE

## 2019-01-17 DIAGNOSIS — E11.8 DIABETIC COMPLICATION (HCC): ICD-10-CM

## 2019-01-17 DIAGNOSIS — E03.9 MYXEDEMA HEART DISEASE: ICD-10-CM

## 2019-01-17 DIAGNOSIS — E27.5 MEDULLOADRENAL HYPERFUNCTION (HCC): Primary | ICD-10-CM

## 2019-01-17 DIAGNOSIS — I51.9 MYXEDEMA HEART DISEASE: ICD-10-CM

## 2019-01-17 DIAGNOSIS — E27.5 MEDULLOADRENAL HYPERFUNCTION (HCC): ICD-10-CM

## 2019-01-17 LAB
ALBUMIN SERPL BCP-MCNC: 3.5 G/DL (ref 3.5–5)
ALP SERPL-CCNC: 46 U/L (ref 46–116)
ALT SERPL W P-5'-P-CCNC: 21 U/L (ref 12–78)
ANION GAP SERPL CALCULATED.3IONS-SCNC: 11 MMOL/L (ref 4–13)
AST SERPL W P-5'-P-CCNC: 13 U/L (ref 5–45)
BASOPHILS # BLD AUTO: 0.04 THOUSANDS/ΜL (ref 0–0.1)
BASOPHILS NFR BLD AUTO: 1 % (ref 0–1)
BILIRUB SERPL-MCNC: 0.8 MG/DL (ref 0.2–1)
BUN SERPL-MCNC: 19 MG/DL (ref 5–25)
CALCIUM SERPL-MCNC: 8.9 MG/DL (ref 8.3–10.1)
CHLORIDE SERPL-SCNC: 103 MMOL/L (ref 100–108)
CO2 SERPL-SCNC: 26 MMOL/L (ref 21–32)
CREAT SERPL-MCNC: 0.95 MG/DL (ref 0.6–1.3)
CREAT UR-MCNC: 238 MG/DL
EOSINOPHIL # BLD AUTO: 0.23 THOUSAND/ΜL (ref 0–0.61)
EOSINOPHIL NFR BLD AUTO: 4 % (ref 0–6)
ERYTHROCYTE [DISTWIDTH] IN BLOOD BY AUTOMATED COUNT: 12.5 % (ref 11.6–15.1)
EST. AVERAGE GLUCOSE BLD GHB EST-MCNC: 223 MG/DL
GFR SERPL CREATININE-BSD FRML MDRD: 62 ML/MIN/1.73SQ M
GLUCOSE P FAST SERPL-MCNC: 184 MG/DL (ref 65–99)
HBA1C MFR BLD: 9.4 % (ref 4.2–6.3)
HCT VFR BLD AUTO: 39.8 % (ref 34.8–46.1)
HGB BLD-MCNC: 13.5 G/DL (ref 11.5–15.4)
IMM GRANULOCYTES # BLD AUTO: 0.02 THOUSAND/UL (ref 0–0.2)
IMM GRANULOCYTES NFR BLD AUTO: 0 % (ref 0–2)
LYMPHOCYTES # BLD AUTO: 1.82 THOUSANDS/ΜL (ref 0.6–4.47)
LYMPHOCYTES NFR BLD AUTO: 28 % (ref 14–44)
MAGNESIUM SERPL-MCNC: 1.5 MG/DL (ref 1.6–2.6)
MCH RBC QN AUTO: 30.2 PG (ref 26.8–34.3)
MCHC RBC AUTO-ENTMCNC: 33.9 G/DL (ref 31.4–37.4)
MCV RBC AUTO: 89 FL (ref 82–98)
MICROALBUMIN UR-MCNC: 54.8 MG/L (ref 0–20)
MICROALBUMIN/CREAT 24H UR: 23 MG/G CREATININE (ref 0–30)
MONOCYTES # BLD AUTO: 0.78 THOUSAND/ΜL (ref 0.17–1.22)
MONOCYTES NFR BLD AUTO: 12 % (ref 4–12)
NEUTROPHILS # BLD AUTO: 3.68 THOUSANDS/ΜL (ref 1.85–7.62)
NEUTS SEG NFR BLD AUTO: 55 % (ref 43–75)
NRBC BLD AUTO-RTO: 0 /100 WBCS
PHOSPHATE SERPL-MCNC: 4.8 MG/DL (ref 2.3–4.1)
PLATELET # BLD AUTO: 288 THOUSANDS/UL (ref 149–390)
PMV BLD AUTO: 11.2 FL (ref 8.9–12.7)
POTASSIUM SERPL-SCNC: 3.6 MMOL/L (ref 3.5–5.3)
PROT SERPL-MCNC: 6.5 G/DL (ref 6.4–8.2)
RBC # BLD AUTO: 4.47 MILLION/UL (ref 3.81–5.12)
SODIUM SERPL-SCNC: 140 MMOL/L (ref 136–145)
T4 FREE SERPL-MCNC: 1.52 NG/DL (ref 0.76–1.46)
TSH SERPL DL<=0.05 MIU/L-ACNC: 1.12 UIU/ML (ref 0.36–3.74)
WBC # BLD AUTO: 6.57 THOUSAND/UL (ref 4.31–10.16)

## 2019-01-17 PROCEDURE — 82570 ASSAY OF URINE CREATININE: CPT | Performed by: SPECIALIST

## 2019-01-17 PROCEDURE — 83036 HEMOGLOBIN GLYCOSYLATED A1C: CPT

## 2019-01-17 PROCEDURE — 36415 COLL VENOUS BLD VENIPUNCTURE: CPT

## 2019-01-17 PROCEDURE — 84443 ASSAY THYROID STIM HORMONE: CPT

## 2019-01-17 PROCEDURE — 83735 ASSAY OF MAGNESIUM: CPT

## 2019-01-17 PROCEDURE — 84100 ASSAY OF PHOSPHORUS: CPT

## 2019-01-17 PROCEDURE — 85025 COMPLETE CBC W/AUTO DIFF WBC: CPT

## 2019-01-17 PROCEDURE — 80053 COMPREHEN METABOLIC PANEL: CPT

## 2019-01-17 PROCEDURE — 82043 UR ALBUMIN QUANTITATIVE: CPT | Performed by: SPECIALIST

## 2019-01-17 PROCEDURE — 84439 ASSAY OF FREE THYROXINE: CPT

## 2019-02-12 NOTE — SOCIAL WORK
Bedside commode was deliver to pt's room this evening 
CM discussed choices for STR with patient at bedside  Patient chose and was accepted to [address] [facility]   CM contacted patient's daughter Jas Fernandez) regarding transport  Daughter is available to transport patient tonight at 6pm if medically cleared 
Met with pt to complete assessment  PCP is Dr Rikki Peraza  Pt lives in [place] with her spouse in a 2 story house with 3 steps to enter  Pt can do a first floor set up  Pt was independent with ADLs, drove a car amb with RW  DME:  RW, cane, reacher, sock aid and grab bars in shower  Pt was not open with [facility] prior  Pt uses [facility] in [place]  Pt has of complex partial seizure with impairment of consciousness at onset at age 59  Spouse is  (Roscoe Luna)  Spouse will transport pt home  Pt is agreeable to [facility] and will need bedside commode  Discharge plan is [facility] for RN (coumadin) and PT  Pt will need a bedside commode  A referral sent to [facility] and they have accepted the case  Fax script for bedside commode to [facility] for them to deliver to pt's home 
carried

## 2019-09-24 PROBLEM — D3A.8 NEUROENDOCRINE TUMOR: Status: ACTIVE | Noted: 2019-09-24

## 2019-09-24 PROBLEM — D3A.8 NEUROENDOCRINE TUMOR: Status: RESOLVED | Noted: 2019-09-24 | Resolved: 2019-09-24

## 2019-09-24 PROBLEM — Z85.9 HISTORY OF MALIGNANT NEUROENDOCRINE TUMOR: Status: ACTIVE | Noted: 2019-09-24

## 2019-09-25 ENCOUNTER — CONSULT (OUTPATIENT)
Dept: SURGICAL ONCOLOGY | Facility: CLINIC | Age: 69
End: 2019-09-25
Payer: MEDICARE

## 2019-09-25 VITALS
WEIGHT: 158 LBS | SYSTOLIC BLOOD PRESSURE: 122 MMHG | BODY MASS INDEX: 33.17 KG/M2 | TEMPERATURE: 98.5 F | HEIGHT: 58 IN | RESPIRATION RATE: 16 BRPM | HEART RATE: 53 BPM | DIASTOLIC BLOOD PRESSURE: 80 MMHG

## 2019-09-25 DIAGNOSIS — D3A.8 NEUROENDOCRINE TUMOR: Primary | ICD-10-CM

## 2019-09-25 PROCEDURE — 99203 OFFICE O/P NEW LOW 30 MIN: CPT | Performed by: SURGERY

## 2019-09-25 NOTE — LETTER
September 25, 2019     Wilmar Bishop DO  [address]  [id number]    Patient: Kristine Queen   YOB: 1950   Date of Visit: 9/25/2019       Dear Dr Víctor Dexter: Thank you for referring Stephan Torres to me for evaluation  Below are my notes for this consultation  If you have questions, please do not hesitate to call me  I look forward to following your patient along with you  Sincerely,        Maldonado Hardin MD        CC: DO Ray Steinberg MD Nehemiah Cobbs, MD Vevelyn Meeter, MD  9/25/2019 11:54 AM  Sign at close encounter     Surgical Oncology Follow Up       [facility]  [address] [place]  1950  [id number]  [address] [place] [address],[address]  [facility] [place]  [address] [address]    Chief Complaint   Patient presents with    Consult       Assessment/Plan:    No problem-specific Assessment & Plan notes found for this encounter  Diagnoses and all orders for this visit:    Neuroendocrine tumor  -     CT chest abdomen pelvis w contrast; Future  -     Basic metabolic panel; Future  -     Chromogranin A; Future        Advance Care Planning/Advance Directives:  Discussed disease status, cancer treatment plans and/or cancer treatment goals with the patient          Neuroendocrine tumor (Resolved)    7/17/2013 Biopsy     Left lower quadrant abdominal mass biopsy:  - Endocrine/neuroendocrine neoplasm      9/19/2013 Surgery     Left retroperitoneal mass excision:  -Neuroendocrine tumor, low grade    - Mitotic activity: less than 2  - Tumor completely encapsulated; no extracapsular extension identified  - No lymph node identified          History of Present Illness:  Patient is a 79-year-old woman who underwent resection of a retroperitoneal mass, hormonally active, which behave like a pheochromocytoma in 2013   She comes in for a follow-up visit   -Interval History:  She has not been here for the last 3 years  She has not had a recent scan  Review of Systems:  Review of Systems   Constitutional: Negative  HENT: Negative  Eyes: Negative  Respiratory: Negative  Cardiovascular: Negative  Gastrointestinal: Negative  Endocrine: Negative  Genitourinary: Negative  Musculoskeletal: Negative  Skin: Negative  Allergic/Immunologic: Negative  Neurological: Negative  Hematological: Negative  Psychiatric/Behavioral: Negative          Patient Active Problem List   Diagnosis    History of stroke    Insulin dependent diabetes mellitus ([facility] )    Hypertension    Hypothyroidism    Aneurysm of basilar artery (HCC)    Seizure ([place] [address] )    PFO (patent foramen ovale)    Pre-operative clearance    Primary osteoarthritis of left hip    Obstructive sleep apnea syndrome     Past Medical History:   Diagnosis Date    Aneurysm of basilar artery ([place] [address] ) 07/2015    Arthritis     Cardiac arrest ([place] [address] ) 2014    Complex partial seizure with impairment of consciousness at onset [facility]) 07/2017    Dependent on wheelchair     "at times"    Diabetes mellitus ([place] [address] )     IDDM    Disease of thyroid gland     Hyperlipidemia     Hypertension     Left hip pain     Left knee pain     Muscle weakness     right sided weakness    Risk for falls     Seizures ([place] [address] )     "pt reports none recent"    Sleep apnea     "doesn't use machine"    Stenosis of middle cerebral artery 07/2015    Stroke ([place] [address] )     B BG, R frontal, L parietal, L pontine    Unsteady gait     Uses walker      Past Surgical History:   Procedure Laterality Date    ABDOMINAL SURGERY      benign mass per pt    DILATION AND CURETTAGE OF UTERUS      HYSTERECTOMY      LA TOTAL HIP ARTHROPLASTY Left 4/27/2018    Procedure: ARTHROPLASTY HIP TOTAL;  Surgeon: Mishel Reeves MD;  Location: [facility];  Service: Orthopedics    TONSILLECTOMY      CHAN TOOTH EXTRACTION       Family History   Problem Relation Age of Onset    Arthritis Mother     Heart disease Father     Diabetes Father      Social History     Socioeconomic History    Marital status: /Civil Union     Spouse name: Not on file    Number of children: Not on file    Years of education: Not on file    Highest education level: Not on file   Occupational History    Not on file   Social Needs    Financial resource strain: Not on file    Food insecurity:     Worry: Not on file     Inability: Not on file    Transportation needs:     Medical: Not on file     Non-medical: Not on file   Tobacco Use    Smoking status: Never Smoker    Smokeless tobacco: Never Used   Substance and Sexual Activity    Alcohol use: Yes     Comment: social    Drug use: No    Sexual activity: Not on file   Lifestyle    Physical activity:     Days per week: Not on file     Minutes per session: Not on file    Stress: Not on file   Relationships    Social connections:     Talks on phone: Not on file     Gets together: Not on file     Attends [employer] service: Not on file     Active member of club or organization: Not on file     Attends meetings of clubs or organizations: Not on file     Relationship status: Not on file    Intimate partner violence:     Fear of current or ex partner: Not on file     Emotionally abused: Not on file     Physically abused: Not on file     Forced sexual activity: Not on file   Other Topics Concern    Not on file   Social History Narrative    Not on file       Current Outpatient Medications:     amLODIPine (NORVASC) 10 mg tablet, Take 10 mg by mouth daily, Disp: , Rfl:     atorvastatin (LIPITOR) 20 mg tablet, Take 20 mg by mouth daily, Disp: , Rfl:     fenofibrate (TRICOR) 145 mg tablet, Take 145 mg by mouth daily, Disp: , Rfl:     FLUoxetine (PROzac) 20 MG tablet, Take 20 mg by mouth daily at bedtime  , Disp: , Rfl:     insulin glargine (LANTUS) 100 units/mL subcutaneous injection, Inject 7 Units under the skin daily at bedtime, Disp: , Rfl:     levETIRAcetam (KEPPRA) 250 mg tablet, Take 500 mg by mouth 2 (two) times a day, Disp: , Rfl:     levothyroxine 75 mcg tablet, Take 75 mcg by mouth daily in the early morning  , Disp: , Rfl:     metFORMIN (GLUCOPHAGE) 1000 MG tablet, Take 1,000 mg by mouth daily with breakfast  , Disp: , Rfl:     warfarin (COUMADIN) 2 mg tablet, Take 3 tablets (6 mg total) by mouth daily Until next blood draw, Disp: , Rfl: 0    acetaminophen (TYLENOL) 325 mg tablet, Take 1 tablet (325 mg total) by mouth every 4 (four) hours as needed for headaches or fever, Disp: 30 tablet, Rfl: 0    celecoxib (CeleBREX) 200 mg capsule, Take 1 capsule (200 mg total) by mouth daily, Disp: , Rfl: 0    docusate sodium (COLACE) 100 mg capsule, Take 1 capsule (100 mg total) by mouth 2 (two) times a day as needed for constipation, Disp: 10 capsule, Rfl: 0    Glucosamine-Chondroit-Vit C-Mn (GLUCOSAMINE CHONDR 1500 COMPLX PO), Take 1 tablet by mouth 2 (two) times a day, Disp: , Rfl:     hydrochlorothiazide (HYDRODIURIL) 25 mg tablet, Take 25 mg by mouth daily, Disp: , Rfl:     insulin lispro (HumaLOG) 100 units/mL injection, Inject 3 Units under the skin 3 (three) times a day before meals, Disp: , Rfl:     lisinopril (ZESTRIL) 30 mg tablet, Take 30 mg by mouth daily, Disp: , Rfl:     Omega-3 Fatty Acids (FISH OIL) 1000 MG CPDR, Take 1,000 mg by mouth 2 (two) times a day  , Disp: , Rfl:     oxyCODONE (ROXICODONE) 5 mg immediate release tablet, Take 1 tablet (5 mg total) by mouth every 4 (four) hours as needed for moderate pain Max Daily Amount: 30 mg, Disp: 30 tablet, Rfl: 0    traMADol (ULTRAM) 50 mg tablet, Take 0 5 tablets (25 mg total) by mouth every 6 (six) hours as needed for moderate pain, Disp: 30 tablet, Rfl: 0  No Known Allergies  Vitals:    09/25/19 1048   BP: 122/80   Pulse: (!) 53   Resp: 16   Temp: 98 5 °F (36 9 °C)       Physical Exam   Constitutional: She is oriented to person, place, and time  She appears well-developed and well-nourished  HENT:   Head: Normocephalic and atraumatic  Right Ear: External ear normal    Left Ear: External ear normal    Mouth/Throat: Oropharynx is clear and moist    Eyes: Pupils are equal, round, and reactive to light  EOM are normal    Neck: Normal range of motion  Neck supple  Cardiovascular: Normal rate, regular rhythm and normal heart sounds  Pulmonary/Chest: Effort normal and breath sounds normal    Abdominal: Soft  Bowel sounds are normal    Musculoskeletal: Normal range of motion  Neurological: She is alert and oriented to person, place, and time  Skin: Skin is warm and dry  Results:  Labs:  none    Imaging  No results found  I reviewed the above laboratory and imaging data  Discussion/Summary:  History of neuroendocrine tumor, retroperitoneal, resected 6 years ago  Will obtain updated CT scan and blood work at this time

## 2019-09-25 NOTE — PROGRESS NOTES
Surgical Oncology Follow Up       [address] [facility],[address]  [facility]  [address] [facility] [address] [facility] [address]    Eli Lilly  1950  [id number]  [address] [facility]  [facility]  [address] [address]    Chief Complaint   Patient presents with    Consult       Assessment/Plan:    No problem-specific Assessment & Plan notes found for this encounter  Diagnoses and all orders for this visit:    Neuroendocrine tumor  -     CT chest abdomen pelvis w contrast; Future  -     Basic metabolic panel; Future  -     Chromogranin A; Future        Advance Care Planning/Advance Directives:  Discussed disease status, cancer treatment plans and/or cancer treatment goals with the patient  Neuroendocrine tumor (Resolved)    7/17/2013 Biopsy     Left lower quadrant abdominal mass biopsy:  - Endocrine/neuroendocrine neoplasm      9/19/2013 Surgery     Left retroperitoneal mass excision:  -Neuroendocrine tumor, low grade    - Mitotic activity: less than 2  - Tumor completely encapsulated; no extracapsular extension identified  - No lymph node identified          History of Present Illness:  Patient is a 62-year-old woman who underwent resection of a retroperitoneal mass, hormonally active, which behave like a pheochromocytoma in 2013  She comes in for a follow-up visit   -Interval History:  She has not been here for the last 3 years  She has not had a recent scan  Review of Systems:  Review of Systems   Constitutional: Negative  HENT: Negative  Eyes: Negative  Respiratory: Negative  Cardiovascular: Negative  Gastrointestinal: Negative  Endocrine: Negative  Genitourinary: Negative  Musculoskeletal: Negative  Skin: Negative  Allergic/Immunologic: Negative  Neurological: Negative  Hematological: Negative  Psychiatric/Behavioral: Negative          Patient Active Problem List Diagnosis    History of stroke    Insulin dependent diabetes mellitus ([place] [address] )    Hypertension    Hypothyroidism    Aneurysm of basilar artery (HCC)    Seizure (HCC)    PFO (patent foramen ovale)    Pre-operative clearance    Primary osteoarthritis of left hip    Obstructive sleep apnea syndrome     Past Medical History:   Diagnosis Date    Aneur[facility]m [facility] basilar artery ([facility] ) 07/2015    Arthritis     Cardiac arrest ([place] [address] ) 2014    Complex partial seizure with impairment of consciousness at onset [facility]) 07/2017    Dependent on wheelchair     "at times"    Diabetes mellitus ([place] [address] )     IDDM    Disease of thyroid gland     Hyperlipidemia     Hypertension     Left hip pain     Left knee pain     Muscle weakness     right sided weakness    Risk for falls     Seizures ([place] [address] )     "pt reports none recent"    Sleep apnea     "doesn't use machine"    Stenosis of middle cerebral artery 07/2015    St[facility])     B BG, R frontal, L parietal, L pontine    Unsteady gait     Uses walker      Past Surgical History:   Procedure Laterality Date    ABDOMINAL SURGERY      benign mass per pt    DILATION AND CURETTAGE OF UTERUS      HYSTERECTOMY      RI TOTAL HIP ARTHROPLASTY Left 4/27/2018    Procedure: ARTHROPLASTY HIP TOTAL;  Surgeon: Mishel Reeves MD;  Location: [facility];  Service: Orthopedics    TONSILLECTOMY      WISDOM TOOTH EXTRACTION       Family History   Problem Relation Age of Onset    Arthritis Mother     Heart disease Father     Diabetes Father      Social History     Socioeconomic History    Marital status: /Civil Union     Spouse name: Not on file    Number of children: Not on file    Years of education: Not on file    Highest education level: Not on file   Occupational History    Not on file   Social Needs    Financial resource strain: Not on file    Food insecurity:     Worry: Not on file     Inability: Not on file    Transportation needs:     Medical: Not on file Non-medical: Not on file   Tobacco Use    Smoking status: Never Smoker    Smokeless tobacco: Never Used   Substance and Sexual Activity    Alcohol use: Yes     Comment: social    Drug use: No    Sexual activity: Not on file   Lifestyle    Physical activity:     Days per week: Not on file     Minutes per session: Not on file    Stress: Not on file   Relationships    Social connections:     Talks on phone: Not on file     Gets together: Not on file     Attends Sikh service: Not on file     Active member of club or organization: Not on file     Attends meetings of clubs or organizations: Not on file     Relationship status: Not on file    Intimate partner violence:     Fear of current or ex partner: Not on file     Emotionally abused: Not on file     Physically abused: Not on file     Forced sexual activity: Not on file   Other Topics Concern    Not on file   Social History Narrative    Not on file       Current Outpatient Medications:     amLODIPine (NORVASC) 10 mg tablet, Take 10 mg by mouth daily, Disp: , Rfl:     atorvastatin (LIPITOR) 20 mg tablet, Take 20 mg by mouth daily, Disp: , Rfl:     fenofibrate (TRICOR) 145 mg tablet, Take 145 mg by mouth daily, Disp: , Rfl:     FLUoxetine (PROzac) 20 MG tablet, Take 20 mg by mouth daily at bedtime  , Disp: , Rfl:     insulin glargine (LANTUS) 100 units/mL subcutaneous injection, Inject 7 Units under the skin daily at bedtime, Disp: , Rfl:     levETIRAcetam (KEPPRA) 250 mg tablet, Take 500 mg by mouth 2 (two) times a day, Disp: , Rfl:     levothyroxine 75 mcg tablet, Take 75 mcg by mouth daily in the early morning  , Disp: , Rfl:     metFORMIN (GLUCOPHAGE) 1000 MG tablet, Take 1,000 mg by mouth daily with breakfast  , Disp: , Rfl:     warfarin (COUMADIN) 2 mg tablet, Take 3 tablets (6 mg total) by mouth daily Until next blood draw, Disp: , Rfl: 0    acetaminophen (TYLENOL) 325 mg tablet, Take 1 tablet (325 mg total) by mouth every 4 (four) hours as needed for headaches or fever, Disp: 30 tablet, Rfl: 0    celecoxib (CeleBREX) 200 mg capsule, Take 1 capsule (200 mg total) by mouth daily, Disp: , Rfl: 0    docusate sodium (COLACE) 100 mg capsule, Take 1 capsule (100 mg total) by mouth 2 (two) times a day as needed for constipation, Disp: 10 capsule, Rfl: 0    Glucosamine-Chondroit-Vit C-Mn (GLUCOSAMINE CHONDR 1500 COMPLX PO), Take 1 tablet by mouth 2 (two) times a day, Disp: , Rfl:     hydrochlorothiazide (HYDRODIURIL) 25 mg tablet, Take 25 mg by mouth daily, Disp: , Rfl:     insulin lispro (HumaLOG) 100 units/mL injection, Inject 3 Units under the skin 3 (three) times a day before meals, Disp: , Rfl:     lisinopril (ZESTRIL) 30 mg tablet, Take 30 mg by mouth daily, Disp: , Rfl:     Omega-3 Fatty Acids (FISH OIL) 1000 MG CPDR, Take 1,000 mg by mouth 2 (two) times a day  , Disp: , Rfl:     oxyCODONE (ROXICODONE) 5 mg immediate release tablet, Take 1 tablet (5 mg total) by mouth every 4 (four) hours as needed for moderate pain Max Daily Amount: 30 mg, Disp: 30 tablet, Rfl: 0    traMADol (ULTRAM) 50 mg tablet, Take 0 5 tablets (25 mg total) by mouth every 6 (six) hours as needed for moderate pain, Disp: 30 tablet, Rfl: 0  No Known Allergies  Vitals:    09/25/19 1048   BP: 122/80   Pulse: (!) 53   Resp: 16   Temp: 98 5 °F (36 9 °C)       Physical Exam   Constitutional: She is oriented to person, place, and time  She appears well-developed and well-nourished  HENT:   Head: Normocephalic and atraumatic  Right Ear: External ear normal    Left Ear: External ear normal    Mouth/Throat: Oropharynx is clear and moist    Eyes: Pupils are equal, round, and reactive to light  EOM are normal    Neck: Normal range of motion  Neck supple  Cardiovascular: Normal rate, regular rhythm and normal heart sounds  Pulmonary/Chest: Effort normal and breath sounds normal    Abdominal: Soft  Bowel sounds are normal    Musculoskeletal: Normal range of motion  Neurological: She is alert and oriented to person, place, and time  Skin: Skin is warm and dry  Results:  Labs:  none    Imaging  No results found  I reviewed the above laboratory and imaging data  Discussion/Summary:  History of neuroendocrine tumor, retroperitoneal, resected 6 years ago  Will obtain updated CT scan and blood work at this time

## 2019-09-27 ENCOUNTER — TRANSCRIBE ORDERS (OUTPATIENT)
Dept: LAB | Facility: CLINIC | Age: 69
End: 2019-09-27

## 2019-09-27 ENCOUNTER — APPOINTMENT (OUTPATIENT)
Dept: LAB | Facility: CLINIC | Age: 69
End: 2019-09-27
Payer: MEDICARE

## 2019-09-27 DIAGNOSIS — D3A.8 NEUROENDOCRINE TUMOR: ICD-10-CM

## 2019-09-27 LAB
ANION GAP SERPL CALCULATED.3IONS-SCNC: 8 MMOL/L (ref 4–13)
BUN SERPL-MCNC: 24 MG/DL (ref 5–25)
CALCIUM SERPL-MCNC: 9.2 MG/DL (ref 8.3–10.1)
CHLORIDE SERPL-SCNC: 106 MMOL/L (ref 100–108)
CO2 SERPL-SCNC: 28 MMOL/L (ref 21–32)
CREAT SERPL-MCNC: 0.94 MG/DL (ref 0.6–1.3)
GFR SERPL CREATININE-BSD FRML MDRD: 63 ML/MIN/1.73SQ M
GLUCOSE P FAST SERPL-MCNC: 113 MG/DL (ref 65–99)
POTASSIUM SERPL-SCNC: 4.1 MMOL/L (ref 3.5–5.3)
SODIUM SERPL-SCNC: 142 MMOL/L (ref 136–145)

## 2019-09-27 PROCEDURE — 86316 IMMUNOASSAY TUMOR OTHER: CPT

## 2019-09-27 PROCEDURE — 80048 BASIC METABOLIC PNL TOTAL CA: CPT

## 2019-09-27 PROCEDURE — 36415 COLL VENOUS BLD VENIPUNCTURE: CPT

## 2019-09-30 LAB — CGA SERPL-SCNC: 5 NMOL/L (ref 0–5)

## 2019-10-02 ENCOUNTER — HOSPITAL ENCOUNTER (OUTPATIENT)
Dept: CT IMAGING | Facility: HOSPITAL | Age: 69
Discharge: HOME | End: 2019-10-02
Attending: SURGERY
Payer: MEDICARE

## 2019-10-02 DIAGNOSIS — D3A.8 NEUROENDOCRINE TUMOR: ICD-10-CM

## 2019-10-02 PROCEDURE — 71260 CT THORAX DX C+: CPT

## 2019-10-02 PROCEDURE — 74177 CT ABD & PELVIS W/CONTRAST: CPT

## 2019-10-02 RX ADMIN — IOHEXOL 100 ML: 350 INJECTION, SOLUTION INTRAVENOUS at 18:59

## 2019-10-09 ENCOUNTER — TRANSCRIBE ORDERS (OUTPATIENT)
Dept: LAB | Facility: CLINIC | Age: 69
End: 2019-10-09

## 2019-10-09 ENCOUNTER — APPOINTMENT (OUTPATIENT)
Dept: LAB | Facility: CLINIC | Age: 69
End: 2019-10-09
Payer: MEDICARE

## 2019-10-09 ENCOUNTER — OFFICE VISIT (OUTPATIENT)
Dept: SURGICAL ONCOLOGY | Facility: CLINIC | Age: 69
End: 2019-10-09
Payer: MEDICARE

## 2019-10-09 VITALS
DIASTOLIC BLOOD PRESSURE: 68 MMHG | WEIGHT: 162 LBS | HEART RATE: 66 BPM | TEMPERATURE: 98.5 F | BODY MASS INDEX: 34 KG/M2 | RESPIRATION RATE: 16 BRPM | SYSTOLIC BLOOD PRESSURE: 130 MMHG | HEIGHT: 58 IN

## 2019-10-09 DIAGNOSIS — E11.65 UNCONTROLLED TYPE 2 DIABETES MELLITUS WITH HYPERGLYCEMIA (HCC): ICD-10-CM

## 2019-10-09 DIAGNOSIS — E03.9 HYPOTHYROIDISM, ACQUIRED: ICD-10-CM

## 2019-10-09 DIAGNOSIS — D3A.8 NEUROENDOCRINE TUMOR: Primary | ICD-10-CM

## 2019-10-09 DIAGNOSIS — E11.00 TYPE II DIABETES MELLITUS WITH HYPEROSMOLARITY, UNCONTROLLED (HCC): Primary | ICD-10-CM

## 2019-10-09 DIAGNOSIS — E27.9 DISORDER OF ADRENAL GLAND, UNSPECIFIED (HCC): ICD-10-CM

## 2019-10-09 DIAGNOSIS — E66.8 OBESITY OF ENDOCRINE ORIGIN: ICD-10-CM

## 2019-10-09 DIAGNOSIS — E11.65 TYPE II DIABETES MELLITUS WITH HYPEROSMOLARITY, UNCONTROLLED (HCC): Primary | ICD-10-CM

## 2019-10-09 LAB
ALBUMIN SERPL BCP-MCNC: 3.3 G/DL (ref 3.5–5)
ALP SERPL-CCNC: 48 U/L (ref 46–116)
ALT SERPL W P-5'-P-CCNC: 15 U/L (ref 12–78)
ANION GAP SERPL CALCULATED.3IONS-SCNC: 8 MMOL/L (ref 4–13)
AST SERPL W P-5'-P-CCNC: 14 U/L (ref 5–45)
BACTERIA UR QL AUTO: ABNORMAL /HPF
BASOPHILS # BLD AUTO: 0.04 THOUSANDS/ΜL (ref 0–0.1)
BASOPHILS NFR BLD AUTO: 1 % (ref 0–1)
BILIRUB SERPL-MCNC: 0.6 MG/DL (ref 0.2–1)
BILIRUB UR QL STRIP: ABNORMAL
BUN SERPL-MCNC: 20 MG/DL (ref 5–25)
CALCIUM SERPL-MCNC: 8.6 MG/DL (ref 8.3–10.1)
CHLORIDE SERPL-SCNC: 105 MMOL/L (ref 100–108)
CLARITY UR: ABNORMAL
CO2 SERPL-SCNC: 27 MMOL/L (ref 21–32)
COLOR UR: YELLOW
CREAT SERPL-MCNC: 0.93 MG/DL (ref 0.6–1.3)
CREAT UR-MCNC: 218 MG/DL
EOSINOPHIL # BLD AUTO: 0.44 THOUSAND/ΜL (ref 0–0.61)
EOSINOPHIL NFR BLD AUTO: 7 % (ref 0–6)
ERYTHROCYTE [DISTWIDTH] IN BLOOD BY AUTOMATED COUNT: 12.9 % (ref 11.6–15.1)
EST. AVERAGE GLUCOSE BLD GHB EST-MCNC: 154 MG/DL
GFR SERPL CREATININE-BSD FRML MDRD: 63 ML/MIN/1.73SQ M
GLUCOSE P FAST SERPL-MCNC: 149 MG/DL (ref 65–99)
GLUCOSE UR STRIP-MCNC: NEGATIVE MG/DL
HBA1C MFR BLD: 7 % (ref 4.2–6.3)
HCT VFR BLD AUTO: 38.2 % (ref 34.8–46.1)
HGB BLD-MCNC: 12.7 G/DL (ref 11.5–15.4)
HGB UR QL STRIP.AUTO: ABNORMAL
IMM GRANULOCYTES # BLD AUTO: 0.02 THOUSAND/UL (ref 0–0.2)
IMM GRANULOCYTES NFR BLD AUTO: 0 % (ref 0–2)
KETONES UR STRIP-MCNC: NEGATIVE MG/DL
LEUKOCYTE ESTERASE UR QL STRIP: ABNORMAL
LYMPHOCYTES # BLD AUTO: 1.39 THOUSANDS/ΜL (ref 0.6–4.47)
LYMPHOCYTES NFR BLD AUTO: 23 % (ref 14–44)
MAGNESIUM SERPL-MCNC: 1.6 MG/DL (ref 1.6–2.6)
MCH RBC QN AUTO: 30.2 PG (ref 26.8–34.3)
MCHC RBC AUTO-ENTMCNC: 33.2 G/DL (ref 31.4–37.4)
MCV RBC AUTO: 91 FL (ref 82–98)
MICROALBUMIN UR-MCNC: 270 MG/L (ref 0–20)
MICROALBUMIN/CREAT 24H UR: 124 MG/G CREATININE (ref 0–30)
MONOCYTES # BLD AUTO: 0.72 THOUSAND/ΜL (ref 0.17–1.22)
MONOCYTES NFR BLD AUTO: 12 % (ref 4–12)
NEUTROPHILS # BLD AUTO: 3.33 THOUSANDS/ΜL (ref 1.85–7.62)
NEUTS SEG NFR BLD AUTO: 57 % (ref 43–75)
NITRITE UR QL STRIP: NEGATIVE
NON-SQ EPI CELLS URNS QL MICRO: ABNORMAL /HPF
NRBC BLD AUTO-RTO: 0 /100 WBCS
PH UR STRIP.AUTO: 5 [PH]
PHOSPHATE SERPL-MCNC: 4.5 MG/DL (ref 2.3–4.1)
PLATELET # BLD AUTO: 271 THOUSANDS/UL (ref 149–390)
PMV BLD AUTO: 10.8 FL (ref 8.9–12.7)
POTASSIUM SERPL-SCNC: 4.9 MMOL/L (ref 3.5–5.3)
PROT SERPL-MCNC: 6.6 G/DL (ref 6.4–8.2)
PROT UR STRIP-MCNC: ABNORMAL MG/DL
RBC # BLD AUTO: 4.21 MILLION/UL (ref 3.81–5.12)
RBC #/AREA URNS AUTO: ABNORMAL /HPF
SODIUM SERPL-SCNC: 140 MMOL/L (ref 136–145)
SP GR UR STRIP.AUTO: >=1.03 (ref 1–1.03)
T4 FREE SERPL-MCNC: 1.42 NG/DL (ref 0.76–1.46)
TSH SERPL DL<=0.05 MIU/L-ACNC: 1.59 UIU/ML (ref 0.36–3.74)
UROBILINOGEN UR QL STRIP.AUTO: 1 E.U./DL
WBC # BLD AUTO: 5.94 THOUSAND/UL (ref 4.31–10.16)
WBC #/AREA URNS AUTO: ABNORMAL /HPF

## 2019-10-09 PROCEDURE — 99214 OFFICE O/P EST MOD 30 MIN: CPT | Performed by: SURGERY

## 2019-10-09 PROCEDURE — 81001 URINALYSIS AUTO W/SCOPE: CPT | Performed by: SPECIALIST

## 2019-10-09 PROCEDURE — 84443 ASSAY THYROID STIM HORMONE: CPT

## 2019-10-09 PROCEDURE — 84439 ASSAY OF FREE THYROXINE: CPT

## 2019-10-09 PROCEDURE — 86316 IMMUNOASSAY TUMOR OTHER: CPT

## 2019-10-09 PROCEDURE — 84100 ASSAY OF PHOSPHORUS: CPT

## 2019-10-09 PROCEDURE — 83735 ASSAY OF MAGNESIUM: CPT

## 2019-10-09 PROCEDURE — 83835 ASSAY OF METANEPHRINES: CPT

## 2019-10-09 PROCEDURE — 80053 COMPREHEN METABOLIC PANEL: CPT

## 2019-10-09 PROCEDURE — 83036 HEMOGLOBIN GLYCOSYLATED A1C: CPT | Performed by: SPECIALIST

## 2019-10-09 PROCEDURE — 85025 COMPLETE CBC W/AUTO DIFF WBC: CPT

## 2019-10-09 PROCEDURE — 82043 UR ALBUMIN QUANTITATIVE: CPT | Performed by: SPECIALIST

## 2019-10-09 PROCEDURE — 82570 ASSAY OF URINE CREATININE: CPT | Performed by: SPECIALIST

## 2019-10-09 PROCEDURE — 36415 COLL VENOUS BLD VENIPUNCTURE: CPT | Performed by: SPECIALIST

## 2019-10-09 NOTE — PROGRESS NOTES
Surgical Oncology Follow Up       [address] [place] [address],[address] [facility]  [facility] [address] [place]  [facility] [address]    Amy Schafer  1950  [id number]  [address] [facility],[address] [place] [address]  [facility] [place]  [address] [facility] [address]    Chief Complaint   Patient presents with    Follow-up     F/U after CT scan  Assessment/Plan:    No problem-specific Assessment & Plan notes found for this encounter  Diagnoses and all orders for this visit:    Neuroendocrine tumor        Advance Care Planning/Advance Directives:  Discussed disease status, cancer treatment plans and/or cancer treatment goals with the patient  Neuroendocrine tumor (Resolved)    7/17/2013 Biopsy     Left lower quadrant abdominal mass biopsy:  - Endocrine/neuroendocrine neoplasm      9/19/2013 Surgery     Left retroperitoneal mass excision:  -Neuroendocrine tumor, low grade    - Mitotic activity: less than 2  - Tumor completely encapsulated; no extracapsular extension identified  - No lymph node identified          History of Present Illness:  Patient is here 6 years post resection of a retroperitoneal/adrenal mass, likely pheochromocytoma   -Interval History:  She is here with recent blood work and CT scan  No new complaints  She is doing well clinically  Review of Systems:  Review of Systems   Constitutional: Negative  HENT: Negative  Eyes: Negative  Respiratory: Negative  Cardiovascular: Negative  Gastrointestinal: Negative  Endocrine: Negative  Genitourinary: Negative  Musculoskeletal: Negative  Skin: Negative  Allergic/Immunologic: Negative  Neurological: Negative  Hematological: Negative  Psychiatric/Behavioral: Negative          Patient Active Problem List   Diagnosis    History of stroke    Insulin dependent diabetes mellitus ([facility] )    Hypertension    Hypothyroidism    Aneurysm of basilar artery ([facility] )    Seizure ([place] [address] )    PFO (patent foramen ovale)    Pre-operative clearance    Primary osteoarthritis of left hip    Obstructive sleep apnea syndrome     Past Medical History:   Diagnosis Date    Aneurysm of basilar artery ([place] [address] ) 07/2015    Arthritis     Cardiac arrest ([place] [address] ) 2014    Complex partial seizure with impairment of consciousness at onset [facility]) 07/2017    Dependent on wheelchair     "at times"    Diabetes mellitus ([place] [address] )     IDDM    Disease of thyroid gland     Hyperlipidemia     Hypertension     Left hip pain     Left knee pain     Muscle weakness     right sided weakness    Risk for falls     Seizures ([place] [address] )     "pt reports none recent"    Sleep apnea     "doesn't use machine"    Stenosis of middle cerebral artery 07/2015    Stroke (HCC)     B BG, R frontal, L parietal, L pontine    Unsteady gait     Uses walker      Past Surgical History:   Procedure Laterality Date    ABDOMINAL SURGERY      benign mass per pt    DILATION AND CURETTAGE OF UTERUS      HYSTERECTOMY      WI TOTAL HIP ARTHROPLASTY Left 4/27/2018    Procedure: ARTHROPLASTY HIP TOTAL;  Surgeon: Rodolfo Love MD;  Location: [facility];  Service: Orthopedics    TONSILLECTOMY      WISDOM TOOTH EXTRACTION       Family History   Problem Relation Age of Onset    Arthritis Mother     Heart disease Father     Diabetes Father      Social History     Socioeconomic History    Marital status: /Civil Union     Spouse name: Not on file    Number of children: Not on file    Years of education: Not on file    Highest education level: Not on file   Occupational History    Not on file   Social Needs    Financial resource strain: Not on file    Food insecurity:     Worry: Not on file     Inability: Not on file    Transportation needs:     Medical: Not on file     Non-medical: Not on file   Tobacco Use    Smoking status: Never Smoker    Smokeless tobacco: Never Used   Substance and Sexual Activity    Alcohol use:  Yes Comment: social    Drug use: No    Sexual activity: Not on file   Lifestyle    Physical activity:     Days per week: Not on file     Minutes per session: Not on file    Stress: Not on file   Relationships    Social connections:     Talks on phone: Not on file     Gets together: Not on file     Attends Faith service: Not on file     Active member of club or organization: Not on file     Attends meetings of clubs or organizations: Not on file     Relationship status: Not on file    Intimate partner violence:     Fear of current or ex partner: Not on file     Emotionally abused: Not on file     Physically abused: Not on file     Forced sexual activity: Not on file   Other Topics Concern    Not on file   Social History Narrative    Not on file       Current Outpatient Medications:     amLODIPine (NORVASC) 10 mg tablet, Take 10 mg by mouth daily, Disp: , Rfl:     atorvastatin (LIPITOR) 20 mg tablet, Take 20 mg by mouth daily, Disp: , Rfl:     fenofibrate (TRICOR) 145 mg tablet, Take 145 mg by mouth daily, Disp: , Rfl:     FLUoxetine (PROzac) 20 MG tablet, Take 20 mg by mouth daily at bedtime  , Disp: , Rfl:     Glucosamine-Chondroit-Vit C-Mn (GLUCOSAMINE CHONDR 1500 COMPLX PO), Take 1 tablet by mouth 2 (two) times a day, Disp: , Rfl:     hydrochlorothiazide (HYDRODIURIL) 25 mg tablet, Take 25 mg by mouth daily, Disp: , Rfl:     insulin glargine (LANTUS) 100 units/mL subcutaneous injection, Inject 7 Units under the skin daily at bedtime, Disp: , Rfl:     insulin lispro (HumaLOG) 100 units/mL injection, Inject 3 Units under the skin 3 (three) times a day before meals, Disp: , Rfl:     levETIRAcetam (KEPPRA) 250 mg tablet, Take 500 mg by mouth 2 (two) times a day, Disp: , Rfl:     levothyroxine 75 mcg tablet, Take 75 mcg by mouth daily in the early morning  , Disp: , Rfl:     lisinopril (ZESTRIL) 30 mg tablet, Take 30 mg by mouth daily, Disp: , Rfl:     metFORMIN (GLUCOPHAGE) 1000 MG tablet, Take 1,000 mg by mouth daily with breakfast  , Disp: , Rfl:     warfarin (COUMADIN) 2 mg tablet, Take 3 tablets (6 mg total) by mouth daily Until next blood draw, Disp: , Rfl: 0  No Known Allergies  Vitals:    10/09/19 1431   BP: 130/68   Pulse: 66   Resp: 16   Temp: 98 5 °F (36 9 °C)       Physical Exam   Constitutional: She is oriented to person, place, and time  She appears well-developed and well-nourished  HENT:   Head: Normocephalic and atraumatic  Right Ear: External ear normal    Left Ear: External ear normal    Eyes: Pupils are equal, round, and reactive to light  EOM are normal    Neck: Normal range of motion  Neck supple  Cardiovascular: Normal rate and regular rhythm  Pulmonary/Chest: Effort normal and breath sounds normal    Abdominal: Soft  Bowel sounds are normal  She exhibits no distension and no mass  There is no tenderness  There is no rebound and no guarding  No hernia  Musculoskeletal: Normal range of motion  Neurological: She is alert and oriented to person, place, and time  Psychiatric: She has a normal mood and affect  Her behavior is normal  Judgment and thought content normal          Results:  Labs:  Ref Range & Units 9/27/19 10:24 AM 6/6/16 10:44 AM    Chromogranin A 0 - 5 nmol/L 5  2 CM   Comment: Chromogranin A performed by [facility]  Results for this test are designated to be for research purposes   only by the assay's   The performance characteristics   of this product have not been established  Results for this test   should not be used as absolute evidence of presence or absence of   malignant disease without confirmation of the diagnosis by another   medically established diagnostic product or procedure  Values   obtained with different assay methods or kits cannot be used   interchangeably           Imaging  Ct Chest Abdomen Pelvis W Contrast    Result Date: 10/4/2019  Narrative: CT CHEST, ABDOMEN AND PELVIS WITH IV CONTRAST INDICATION: D3A 8: Other benign neuroendocrine tumors  History of a left retroperitoneal neuroendocrine tumor which was resected approximately 6 years ago  Restaging  COMPARISON:  CT, dated June 8, 2016  TECHNIQUE: CT examination of the chest, abdomen and pelvis was performed  Axial, sagittal, and coronal 2D reformatted images were created from the source data and submitted for interpretation  Radiation dose length product (DLP) for this visit:  710 mGy-cm   This examination, like all CT scans performed in the [facility], was performed utilizing techniques to minimize radiation dose exposure, including the use of iterative reconstruction and automated exposure control  IV Contrast:  100 mL of iohexol (OMNIPAQUE) Enteric Contrast: Enteric contrast was administered  FINDINGS: CHEST LUNGS:  There are no pulmonary nodules  No endobronchial or tracheal lesion is seen  A small amount of scarring is seen at the right lung base  PLEURA:  A small area of partially calcified pleural thickening is seen near the left costophrenic sulcus  HEART/GREAT VESSELS:  Unremarkable for patient's age  MEDIASTINUM AND JASMINE:  Unremarkable  CHEST WALL AND LOWER NECK:   Unremarkable  ABDOMEN LIVER/BILIARY TREE:  Unremarkable  GALLBLADDER:  There are gallstone(s) within the gallbladder, without pericholecystic inflammatory changes  SPLEEN:  Several unchanged scattered hypoattenuating lesions are seen throughout the spleen, technically indeterminate, but presumed benign  PANCREAS:  Unremarkable  ADRENAL GLANDS:  Unremarkable  KIDNEYS/URETERS:  Unremarkable  No hydronephrosis  STOMACH AND BOWEL:  Unremarkable  APPENDIX:  No findings to suggest appendicitis  ABDOMINOPELVIC CAVITY:  Redemonstrated are postsurgical changes related to left retroperitoneal mass resection  A soft tissue nodule abutting the surgical clips measures 12 x 11 mm in greatest transverse dimensions (series 2, image 69), unchanged from the prior study in 2016    Also unchanged are small areas of soft tissue nodularity extending through the surgical clips  No ascites or free intraperitoneal air  No lymphadenopathy  VESSELS:  Unremarkable for patient's age  PELVIS REPRODUCTIVE ORGANS:  Limited evaluation secondary to a left total hip replacement  URINARY BLADDER:  Limited evaluation secondary to left total hip replacement  ABDOMINAL WALL/INGUINAL REGIONS:  Unremarkable  OSSEOUS STRUCTURES:  There are age appropriate degenerative changes  No acute fracture or destructive osseous lesion  Left hip arthroplasty has been performed  There is no evidence of hardware complication  Impression: CHEST: No evidence of metastatic disease  ABDOMEN AND PELVIS: 1   Stable postsurgical changes related to left retroperitoneal mass resection  An area of soft tissue nodularity within the surgical field measures 12 x 11 mm, which is unchanged from the comparison examination in 2016  Additionally, soft tissue nodularity extends through the surgical clips within the left retroperitoneum, again unchanged  2   No evidence of distant metastatic disease in the abdomen or pelvis  Workstation performed: [id number]     I reviewed the above laboratory and imaging data  Discussion/Summary:  History of left adrenalectomy, 6 years post   No evidence of recurrence or metastasis  Will follow up on a p r n  Basis

## 2019-10-09 NOTE — LETTER
October 9, 2019     Zenia Grigsby DO  [address]  [email or web address] [address]    Patient: Jennifer Salazar   YOB: 1950   Date of Visit: 10/9/2019       Dear Dr Maikel Guerra: Thank you for referring Lesly Leung to me for evaluation  Below are my notes for this consultation  If you have questions, please do not hesitate to call me  I look forward to following your patient along with you  Sincerely,        Allie Boudreaux MD        CC: MD Santiago Zhao MD Hetty Rams, MD  10/9/2019  2:55 PM  Sign at close encounter     Surgical Oncology Follow Up       [address] [facility]  [address] [facility] [address]  1950  [id number]  [address] [facility]  [facility] [place]  [address] [facility] [address]    Chief Complaint   Patient presents with    Follow-up     F/U after CT scan  Assessment/Plan:    No problem-specific Assessment & Plan notes found for this encounter  Diagnoses and all orders for this visit:    Neuroendocrine tumor        Advance Care Planning/Advance Directives:  Discussed disease status, cancer treatment plans and/or cancer treatment goals with the patient  Neuroendocrine tumor (Resolved)    7/17/2013 Biopsy     Left lower quadrant abdominal mass biopsy:  - Endocrine/neuroendocrine neoplasm      9/19/2013 Surgery     Left retroperitoneal mass excision:  -Neuroendocrine tumor, low grade    - Mitotic activity: less than 2  - Tumor completely encapsulated; no extracapsular extension identified  - No lymph node identified          History of Present Illness:  Patient is here 6 years post resection of a retroperitoneal/adrenal mass, likely pheochromocytoma   -Interval History:  She is here with recent blood work and CT scan  No new complaints  She is doing well clinically      Review of Systems:  Review of Systems Constitutional: Negative  HENT: Negative  Eyes: Negative  Respiratory: Negative  Cardiovascular: Negative  Gastrointestinal: Negative  Endocrine: Negative  Genitourinary: Negative  Musculoskeletal: Negative  Skin: Negative  Allergic/Immunologic: Negative  Neurological: Negative  Hematological: Negative  Psychiatric/Behavioral: Negative          Patient Active Problem List   Diagnosis    History of stroke    Insulin dependent diabetes mellitus ([place] [address] )    Hypertension    Hypothyroidism    Aneurysm of basilar artery (HCC)    Seizure ([place] [address] )    PFO (patent foramen ovale)    Pre-operative clearance    Primary osteoarthritis of left hip    Obstructive sleep apnea syndrome     Past Medical History:   Diagnosis Date    Aneurysm of basilar artery ([place] [address] ) 07/2015    Arthritis     Cardiac arrest ([place] [address] ) 2014    Complex partial seizure with impairment of consciousness at onset [facility]) 07/2017    Dependent on wheelchair     "at times"    Diabetes mellitus ([place] [address] )     IDDM    Disease of thyroid gland     Hyperlipidemia     Hypertension     Left hip pain     Left knee pain     Muscle weakness     right sided weakness    Risk for falls     Seizures ([place] [address] )     "pt reports none recent"    Sleep apnea     "doesn't use machine"    Stenosis of middle cerebral artery 07/2015    Stroke ([place] [address] )     B BG, R frontal, L parietal, L pontine    Unsteady gait     Uses walker      Past Surgical History:   Procedure Laterality Date    ABDOMINAL SURGERY      benign mass per pt    DILATION AND CURETTAGE OF UTERUS      HYSTERECTOMY      DE TOTAL HIP ARTHROPLASTY Left 4/27/2018    Procedure: ARTHROPLASTY HIP TOTAL;  Surgeon: Kulwinder Cormier MD;  [facility];  Service: Orthopedics    TONSILLECTOMY      WISDOM TOOTH EXTRACTION       Family History   Problem Relation Age of Onset    Arthritis Mother     Heart disease Father     Diabetes Father      Social History     Socioeconomic History    Marital status: /Civil Union     Spouse name: Not on file    Number of children: Not on file    Years of education: Not on file    Highest education level: Not on file   Occupational History    Not on file   Social Needs    Financial resource strain: Not on file    Food insecurity:     Worry: Not on file     Inability: Not on file    Transportation needs:     Medical: Not on file     Non-medical: Not on file   Tobacco Use    Smoking status: Never Smoker    Smokeless tobacco: Never Used   Substance and Sexual Activity    Alcohol use: Yes     Comment: social    Drug use: No    Sexual activity: Not on file   Lifestyle    Physical activity:     Days per week: Not on file     Minutes per session: Not on file    Stress: Not on file   Relationships    Social connections:     Talks on phone: Not on file     Gets together: Not on file     Attends [employer] service: Not on file     Active member of club or organization: Not on file     Attends meetings of clubs or organizations: Not on file     Relationship status: Not on file    Intimate partner violence:     Fear of current or ex partner: Not on file     Emotionally abused: Not on file     Physically abused: Not on file     Forced sexual activity: Not on file   Other Topics Concern    Not on file   Social History Narrative    Not on file       Current Outpatient Medications:     amLODIPine (NORVASC) 10 mg tablet, Take 10 mg by mouth daily, Disp: , Rfl:     atorvastatin (LIPITOR) 20 mg tablet, Take 20 mg by mouth daily, Disp: , Rfl:     fenofibrate (TRICOR) 145 mg tablet, Take 145 mg by mouth daily, Disp: , Rfl:     FLUoxetine (PROzac) 20 MG tablet, Take 20 mg by mouth daily at bedtime  , Disp: , Rfl:     Glucosamine-Chondroit-Vit C-Mn (GLUCOSAMINE CHONDR 1500 COMPLX PO), Take 1 tablet by mouth 2 (two) times a day, Disp: , Rfl:     hydrochlorothiazide (HYDRODIURIL) 25 mg tablet, Take 25 mg by mouth daily, Disp: , Rfl:     insulin glargine (LANTUS) 100 units/mL subcutaneous injection, Inject 7 Units under the skin daily at bedtime, Disp: , Rfl:     insulin lispro (HumaLOG) 100 units/mL injection, Inject 3 Units under the skin 3 (three) times a day before meals, Disp: , Rfl:     levETIRAcetam (KEPPRA) 250 mg tablet, Take 500 mg by mouth 2 (two) times a day, Disp: , Rfl:     levothyroxine 75 mcg tablet, Take 75 mcg by mouth daily in the early morning  , Disp: , Rfl:     lisinopril (ZESTRIL) 30 mg tablet, Take 30 mg by mouth daily, Disp: , Rfl:     metFORMIN (GLUCOPHAGE) 1000 MG tablet, Take 1,000 mg by mouth daily with breakfast  , Disp: , Rfl:     warfarin (COUMADIN) 2 mg tablet, Take 3 tablets (6 mg total) by mouth daily Until next blood draw, Disp: , Rfl: 0  No Known Allergies  Vitals:    10/09/19 1431   BP: 130/68   Pulse: 66   Resp: 16   Temp: 98 5 °F (36 9 °C)       Physical Exam   Constitutional: She is oriented to person, place, and time  She appears well-developed and well-nourished  HENT:   Head: Normocephalic and atraumatic  Right Ear: External ear normal    Left Ear: External ear normal    Eyes: Pupils are equal, round, and reactive to light  EOM are normal    Neck: Normal range of motion  Neck supple  Cardiovascular: Normal rate and regular rhythm  Pulmonary/Chest: Effort normal and breath sounds normal    Abdominal: Soft  Bowel sounds are normal  She exhibits no distension and no mass  There is no tenderness  There is no rebound and no guarding  No hernia  Musculoskeletal: Normal range of motion  Neurological: She is alert and oriented to person, place, and time  Psychiatric: She has a normal mood and affect  Her behavior is normal  Judgment and thought content normal          Results:  Labs:  Ref Range & Units 9/27/19 10:24 AM 6/6/16 10:44 AM    Chromogranin A 0 - 5 nmol/L 5  2 CM   Comment: Chromogranin A performed by [facility]     Results for this test are designated to be for research purposes   only by the assay's   The performance characteristics   of this product have not been established  Results for this test   should not be used as absolute evidence of presence or absence of   malignant disease without confirmation of the diagnosis by another   medically established diagnostic product or procedure  Values   obtained with different assay methods or kits cannot be used   interchangeably  Imaging  Ct Chest Abdomen Pelvis W Contrast    Result Date: 10/4/2019  Narrative: CT CHEST, ABDOMEN AND PELVIS WITH IV CONTRAST INDICATION:   D3A 8: Other benign neuroendocrine tumors  History of a left retroperitoneal neuroendocrine tumor which was resected approximately 6 years ago  Restaging  COMPARISON:  CT, dated June 8, 2016  TECHNIQUE: CT examination of the chest, abdomen and pelvis was performed  Axial, sagittal, and coronal 2D reformatted images were created from the source data and submitted for interpretation  Radiation dose length product (DLP) for this visit:  710 mGy-cm   This examination, like all CT scans performed in the [facility], was performed utilizing techniques to minimize radiation dose exposure, including the use of iterative reconstruction and automated exposure control  IV Contrast:  100 mL of iohexol (OMNIPAQUE) Enteric Contrast: Enteric contrast was administered  FINDINGS: CHEST LUNGS:  There are no pulmonary nodules  No endobronchial or tracheal lesion is seen  A small amount of scarring is seen at the right lung base  PLEURA:  A small area of partially calcified pleural thickening is seen near the left costophrenic sulcus  HEART/GREAT VESSELS:  Unremarkable for patient's age  MEDIASTINUM AND JASMINE:  Unremarkable  CHEST WALL AND LOWER NECK:   Unremarkable  ABDOMEN LIVER/BILIARY TREE:  Unremarkable  GALLBLADDER:  There are gallstone(s) within the gallbladder, without pericholecystic inflammatory changes   SPLEEN:  Several unchanged scattered hypoattenuating lesions are seen throughout the spleen, technically indeterminate, but presumed benign  PANCREAS:  Unremarkable  ADRENAL GLANDS:  Unremarkable  KIDNEYS/URETERS:  Unremarkable  No hydronephrosis  STOMACH AND BOWEL:  Unremarkable  APPENDIX:  No findings to suggest appendicitis  ABDOMINOPELVIC CAVITY:  Redemonstrated are postsurgical changes related to left retroperitoneal mass resection  A soft tissue nodule abutting the surgical clips measures 12 x 11 mm in greatest transverse dimensions (series 2, image 69), unchanged from the prior study in 2016  Also unchanged are small areas of soft tissue nodularity extending through the surgical clips  No ascites or free intraperitoneal air  No lymphadenopathy  VESSELS:  Unremarkable for patient's age  PELVIS REPRODUCTIVE ORGANS:  Limited evaluation secondary to a left total hip replacement  URINARY BLADDER:  Limited evaluation secondary to left total hip replacement  ABDOMINAL WALL/INGUINAL REGIONS:  Unremarkable  OSSEOUS STRUCTURES:  There are age appropriate degenerative changes  No acute fracture or destructive osseous lesion  Left hip arthroplasty has been performed  There is no evidence of hardware complication  Impression: CHEST: No evidence of metastatic disease  ABDOMEN AND PELVIS: 1   Stable postsurgical changes related to left retroperitoneal mass resection  An area of soft tissue nodularity within the surgical field measures 12 x 11 mm, which is unchanged from the comparison examination in 2016  Additionally, soft tissue nodularity extends through the surgical clips within the left retroperitoneum, again unchanged  2   No evidence of distant metastatic disease in the abdomen or pelvis  Workstation performed: [id number]     I reviewed the above laboratory and imaging data  Discussion/Summary:  History of left adrenalectomy, 6 years post   No evidence of recurrence or metastasis  Will follow up on a p r n  Basis

## 2019-10-11 LAB — CGA SERPL-SCNC: 3 NMOL/L (ref 0–5)

## 2019-10-15 LAB
METANEPH FREE SERPL-MCNC: 11 PG/ML (ref 0–62)
NORMETANEPHRINE SERPL-MCNC: 34 PG/ML (ref 0–145)

## 2020-09-21 ENCOUNTER — APPOINTMENT (EMERGENCY)
Dept: RADIOLOGY | Facility: HOSPITAL | Age: 70
DRG: 552 | End: 2020-09-21
Payer: MEDICARE

## 2020-09-21 ENCOUNTER — HOSPITAL ENCOUNTER (INPATIENT)
Facility: HOSPITAL | Age: 70
LOS: 1 days | Discharge: NURSING, REHAB OR LONG-TERM CARE | DRG: 552 | End: 2020-09-23
Attending: EMERGENCY MEDICINE | Admitting: INTERNAL MEDICINE
Payer: MEDICARE

## 2020-09-21 ENCOUNTER — APPOINTMENT (EMERGENCY)
Dept: CT IMAGING | Facility: HOSPITAL | Age: 70
DRG: 552 | End: 2020-09-21
Payer: MEDICARE

## 2020-09-21 DIAGNOSIS — G89.29 CHRONIC BILATERAL LOW BACK PAIN WITHOUT SCIATICA: Primary | ICD-10-CM

## 2020-09-21 DIAGNOSIS — E11.9 DM2 (DIABETES MELLITUS, TYPE 2) (HCC): ICD-10-CM

## 2020-09-21 DIAGNOSIS — M54.50 CHRONIC BILATERAL LOW BACK PAIN WITHOUT SCIATICA: Primary | ICD-10-CM

## 2020-09-21 DIAGNOSIS — R26.2 AMBULATORY DYSFUNCTION: ICD-10-CM

## 2020-09-21 DIAGNOSIS — M54.50 ACUTE LOW BACK PAIN: ICD-10-CM

## 2020-09-21 DIAGNOSIS — K59.00 CONSTIPATION: ICD-10-CM

## 2020-09-21 PROBLEM — R33.9 URINARY RETENTION: Status: ACTIVE | Noted: 2020-09-21

## 2020-09-21 PROBLEM — I16.0 HYPERTENSIVE URGENCY: Status: ACTIVE | Noted: 2020-09-21

## 2020-09-21 LAB
ALBUMIN SERPL BCP-MCNC: 3.4 G/DL (ref 3.5–5)
ALP SERPL-CCNC: 59 U/L (ref 46–116)
ALT SERPL W P-5'-P-CCNC: 28 U/L (ref 12–78)
ANION GAP SERPL CALCULATED.3IONS-SCNC: 13 MMOL/L (ref 4–13)
AST SERPL W P-5'-P-CCNC: 22 U/L (ref 5–45)
BACTERIA UR QL AUTO: ABNORMAL /HPF
BASOPHILS # BLD AUTO: 0.04 THOUSANDS/ΜL (ref 0–0.1)
BASOPHILS NFR BLD AUTO: 1 % (ref 0–1)
BILIRUB SERPL-MCNC: 0.9 MG/DL (ref 0.2–1)
BILIRUB UR QL STRIP: NEGATIVE
BUN SERPL-MCNC: 18 MG/DL (ref 5–25)
CALCIUM ALBUM COR SERPL-MCNC: 9.5 MG/DL (ref 8.3–10.1)
CALCIUM SERPL-MCNC: 9 MG/DL (ref 8.3–10.1)
CHLORIDE SERPL-SCNC: 104 MMOL/L (ref 100–108)
CLARITY UR: CLEAR
CO2 SERPL-SCNC: 24 MMOL/L (ref 21–32)
COLOR UR: YELLOW
CREAT SERPL-MCNC: 0.83 MG/DL (ref 0.6–1.3)
EOSINOPHIL # BLD AUTO: 0.2 THOUSAND/ΜL (ref 0–0.61)
EOSINOPHIL NFR BLD AUTO: 3 % (ref 0–6)
ERYTHROCYTE [DISTWIDTH] IN BLOOD BY AUTOMATED COUNT: 13.4 % (ref 11.6–15.1)
GFR SERPL CREATININE-BSD FRML MDRD: 72 ML/MIN/1.73SQ M
GLUCOSE SERPL-MCNC: 206 MG/DL (ref 65–140)
GLUCOSE SERPL-MCNC: 220 MG/DL (ref 65–140)
GLUCOSE SERPL-MCNC: 328 MG/DL (ref 65–140)
GLUCOSE UR STRIP-MCNC: ABNORMAL MG/DL
HCT VFR BLD AUTO: 41.5 % (ref 34.8–46.1)
HGB BLD-MCNC: 13.9 G/DL (ref 11.5–15.4)
HGB UR QL STRIP.AUTO: ABNORMAL
IMM GRANULOCYTES # BLD AUTO: 0.02 THOUSAND/UL (ref 0–0.2)
IMM GRANULOCYTES NFR BLD AUTO: 0 % (ref 0–2)
KETONES UR STRIP-MCNC: ABNORMAL MG/DL
LEUKOCYTE ESTERASE UR QL STRIP: NEGATIVE
LYMPHOCYTES # BLD AUTO: 1.36 THOUSANDS/ΜL (ref 0.6–4.47)
LYMPHOCYTES NFR BLD AUTO: 20 % (ref 14–44)
MCH RBC QN AUTO: 30.8 PG (ref 26.8–34.3)
MCHC RBC AUTO-ENTMCNC: 33.5 G/DL (ref 31.4–37.4)
MCV RBC AUTO: 92 FL (ref 82–98)
MONOCYTES # BLD AUTO: 0.78 THOUSAND/ΜL (ref 0.17–1.22)
MONOCYTES NFR BLD AUTO: 11 % (ref 4–12)
NEUTROPHILS # BLD AUTO: 4.51 THOUSANDS/ΜL (ref 1.85–7.62)
NEUTS SEG NFR BLD AUTO: 65 % (ref 43–75)
NITRITE UR QL STRIP: NEGATIVE
NON-SQ EPI CELLS URNS QL MICRO: ABNORMAL /HPF
NRBC BLD AUTO-RTO: 0 /100 WBCS
PH UR STRIP.AUTO: 5.5 [PH]
PLATELET # BLD AUTO: 268 THOUSANDS/UL (ref 149–390)
PMV BLD AUTO: 11 FL (ref 8.9–12.7)
POTASSIUM SERPL-SCNC: 3.5 MMOL/L (ref 3.5–5.3)
PROT SERPL-MCNC: 7 G/DL (ref 6.4–8.2)
PROT UR STRIP-MCNC: >=300 MG/DL
RBC # BLD AUTO: 4.52 MILLION/UL (ref 3.81–5.12)
RBC #/AREA URNS AUTO: ABNORMAL /HPF
SODIUM SERPL-SCNC: 141 MMOL/L (ref 136–145)
SP GR UR STRIP.AUTO: 1.02 (ref 1–1.03)
UROBILINOGEN UR QL STRIP.AUTO: 0.2 E.U./DL
WBC # BLD AUTO: 6.91 THOUSAND/UL (ref 4.31–10.16)
WBC #/AREA URNS AUTO: ABNORMAL /HPF

## 2020-09-21 PROCEDURE — G1004 CDSM NDSC: HCPCS

## 2020-09-21 PROCEDURE — 72100 X-RAY EXAM L-S SPINE 2/3 VWS: CPT

## 2020-09-21 PROCEDURE — 71275 CT ANGIOGRAPHY CHEST: CPT

## 2020-09-21 PROCEDURE — 1123F ACP DISCUSS/DSCN MKR DOCD: CPT | Performed by: EMERGENCY MEDICINE

## 2020-09-21 PROCEDURE — 99285 EMERGENCY DEPT VISIT HI MDM: CPT

## 2020-09-21 PROCEDURE — 82948 REAGENT STRIP/BLOOD GLUCOSE: CPT

## 2020-09-21 PROCEDURE — 74175 CTA ABDOMEN W/CONTRAST: CPT

## 2020-09-21 PROCEDURE — 85025 COMPLETE CBC W/AUTO DIFF WBC: CPT | Performed by: EMERGENCY MEDICINE

## 2020-09-21 PROCEDURE — 80053 COMPREHEN METABOLIC PANEL: CPT | Performed by: EMERGENCY MEDICINE

## 2020-09-21 PROCEDURE — 36415 COLL VENOUS BLD VENIPUNCTURE: CPT | Performed by: EMERGENCY MEDICINE

## 2020-09-21 PROCEDURE — 96374 THER/PROPH/DIAG INJ IV PUSH: CPT

## 2020-09-21 PROCEDURE — 99285 EMERGENCY DEPT VISIT HI MDM: CPT | Performed by: EMERGENCY MEDICINE

## 2020-09-21 PROCEDURE — 99220 PR INITIAL OBSERVATION CARE/DAY 70 MINUTES: CPT | Performed by: INTERNAL MEDICINE

## 2020-09-21 PROCEDURE — 81001 URINALYSIS AUTO W/SCOPE: CPT | Performed by: EMERGENCY MEDICINE

## 2020-09-21 RX ORDER — ACETAMINOPHEN 325 MG/1
975 TABLET ORAL ONCE
Status: COMPLETED | OUTPATIENT
Start: 2020-09-21 | End: 2020-09-21

## 2020-09-21 RX ORDER — HYDRALAZINE HYDROCHLORIDE 20 MG/ML
5 INJECTION INTRAMUSCULAR; INTRAVENOUS EVERY 8 HOURS PRN
Status: DISCONTINUED | OUTPATIENT
Start: 2020-09-21 | End: 2020-09-23 | Stop reason: HOSPADM

## 2020-09-21 RX ORDER — HYDRALAZINE HYDROCHLORIDE 20 MG/ML
5 INJECTION INTRAMUSCULAR; INTRAVENOUS ONCE
Status: COMPLETED | OUTPATIENT
Start: 2020-09-21 | End: 2020-09-21

## 2020-09-21 RX ORDER — AMLODIPINE BESYLATE 10 MG/1
10 TABLET ORAL DAILY
Status: DISCONTINUED | OUTPATIENT
Start: 2020-09-22 | End: 2020-09-22

## 2020-09-21 RX ORDER — HYDRALAZINE HYDROCHLORIDE 20 MG/ML
10 INJECTION INTRAMUSCULAR; INTRAVENOUS ONCE AS NEEDED
Status: COMPLETED | OUTPATIENT
Start: 2020-09-21 | End: 2020-09-21

## 2020-09-21 RX ORDER — MUSCLE RUB CREAM 100; 150 MG/G; MG/G
CREAM TOPICAL 3 TIMES DAILY
Status: DISCONTINUED | OUTPATIENT
Start: 2020-09-21 | End: 2020-09-23 | Stop reason: HOSPADM

## 2020-09-21 RX ORDER — METHOCARBAMOL 500 MG/1
500 TABLET, FILM COATED ORAL EVERY 6 HOURS SCHEDULED
Status: DISCONTINUED | OUTPATIENT
Start: 2020-09-21 | End: 2020-09-23

## 2020-09-21 RX ORDER — LIDOCAINE 50 MG/G
1 PATCH TOPICAL ONCE
Status: COMPLETED | OUTPATIENT
Start: 2020-09-21 | End: 2020-09-21

## 2020-09-21 RX ORDER — FENOFIBRATE 145 MG/1
145 TABLET, COATED ORAL DAILY
Status: DISCONTINUED | OUTPATIENT
Start: 2020-09-22 | End: 2020-09-23 | Stop reason: HOSPADM

## 2020-09-21 RX ORDER — CLOPIDOGREL BISULFATE 75 MG/1
TABLET ORAL
COMMUNITY
Start: 2020-07-09

## 2020-09-21 RX ORDER — HYDROCODONE BITARTRATE AND ACETAMINOPHEN 5; 325 MG/1; MG/1
1 TABLET ORAL ONCE
Status: DISCONTINUED | OUTPATIENT
Start: 2020-09-21 | End: 2020-09-21

## 2020-09-21 RX ORDER — ATORVASTATIN CALCIUM 40 MG/1
40 TABLET, FILM COATED ORAL DAILY
Status: DISCONTINUED | OUTPATIENT
Start: 2020-09-22 | End: 2020-09-23 | Stop reason: HOSPADM

## 2020-09-21 RX ORDER — MORPHINE SULFATE 15 MG/1
15 TABLET ORAL ONCE
Status: COMPLETED | OUTPATIENT
Start: 2020-09-21 | End: 2020-09-21

## 2020-09-21 RX ORDER — LEVOTHYROXINE SODIUM 0.07 MG/1
75 TABLET ORAL
Status: DISCONTINUED | OUTPATIENT
Start: 2020-09-22 | End: 2020-09-23 | Stop reason: HOSPADM

## 2020-09-21 RX ORDER — FLUOXETINE HYDROCHLORIDE 20 MG/1
20 CAPSULE ORAL
Status: DISCONTINUED | OUTPATIENT
Start: 2020-09-21 | End: 2020-09-23 | Stop reason: HOSPADM

## 2020-09-21 RX ORDER — INSULIN GLARGINE 100 [IU]/ML
7 INJECTION, SOLUTION SUBCUTANEOUS
Status: DISCONTINUED | OUTPATIENT
Start: 2020-09-21 | End: 2020-09-22

## 2020-09-21 RX ORDER — HYDROCHLOROTHIAZIDE 25 MG/1
25 TABLET ORAL DAILY
Status: DISCONTINUED | OUTPATIENT
Start: 2020-09-22 | End: 2020-09-22

## 2020-09-21 RX ORDER — CLONIDINE HYDROCHLORIDE 0.1 MG/1
0.3 TABLET ORAL DAILY
Status: DISCONTINUED | OUTPATIENT
Start: 2020-09-22 | End: 2020-09-22

## 2020-09-21 RX ORDER — CLOPIDOGREL BISULFATE 75 MG/1
75 TABLET ORAL DAILY
Status: DISCONTINUED | OUTPATIENT
Start: 2020-09-22 | End: 2020-09-23 | Stop reason: HOSPADM

## 2020-09-21 RX ORDER — LEVETIRACETAM 500 MG/1
500 TABLET ORAL 2 TIMES DAILY
Status: DISCONTINUED | OUTPATIENT
Start: 2020-09-21 | End: 2020-09-23 | Stop reason: HOSPADM

## 2020-09-21 RX ORDER — CHLORAL HYDRATE 500 MG
CAPSULE ORAL 2 TIMES DAILY
COMMUNITY

## 2020-09-21 RX ORDER — ONDANSETRON 2 MG/ML
4 INJECTION INTRAMUSCULAR; INTRAVENOUS EVERY 4 HOURS PRN
Status: DISCONTINUED | OUTPATIENT
Start: 2020-09-21 | End: 2020-09-23 | Stop reason: HOSPADM

## 2020-09-21 RX ORDER — KETOROLAC TROMETHAMINE 30 MG/ML
15 INJECTION, SOLUTION INTRAMUSCULAR; INTRAVENOUS EVERY 6 HOURS PRN
Status: DISCONTINUED | OUTPATIENT
Start: 2020-09-21 | End: 2020-09-23 | Stop reason: HOSPADM

## 2020-09-21 RX ORDER — LABETALOL 20 MG/4 ML (5 MG/ML) INTRAVENOUS SYRINGE
10 EVERY 6 HOURS PRN
Status: DISCONTINUED | OUTPATIENT
Start: 2020-09-21 | End: 2020-09-23 | Stop reason: HOSPADM

## 2020-09-21 RX ORDER — LIDOCAINE 50 MG/G
1 PATCH TOPICAL DAILY
Status: DISCONTINUED | OUTPATIENT
Start: 2020-09-22 | End: 2020-09-23

## 2020-09-21 RX ORDER — OXYCODONE HYDROCHLORIDE 5 MG/1
5 TABLET ORAL EVERY 4 HOURS PRN
Status: DISCONTINUED | OUTPATIENT
Start: 2020-09-21 | End: 2020-09-23 | Stop reason: HOSPADM

## 2020-09-21 RX ORDER — ACETAMINOPHEN 325 MG/1
975 TABLET ORAL EVERY 8 HOURS SCHEDULED
Status: DISCONTINUED | OUTPATIENT
Start: 2020-09-21 | End: 2020-09-23 | Stop reason: HOSPADM

## 2020-09-21 RX ADMIN — KETOROLAC TROMETHAMINE 15 MG: 30 INJECTION, SOLUTION INTRAMUSCULAR at 22:37

## 2020-09-21 RX ADMIN — HYDRALAZINE HYDROCHLORIDE 5 MG: 20 INJECTION INTRAMUSCULAR; INTRAVENOUS at 10:49

## 2020-09-21 RX ADMIN — MORPHINE SULFATE 15 MG: 15 TABLET ORAL at 10:29

## 2020-09-21 RX ADMIN — INSULIN GLARGINE 7 UNITS: 100 INJECTION, SOLUTION SUBCUTANEOUS at 21:30

## 2020-09-21 RX ADMIN — ACETAMINOPHEN 975 MG: 325 TABLET, FILM COATED ORAL at 08:10

## 2020-09-21 RX ADMIN — METHOCARBAMOL 500 MG: 500 TABLET ORAL at 17:31

## 2020-09-21 RX ADMIN — IOHEXOL 100 ML: 350 INJECTION, SOLUTION INTRAVENOUS at 11:05

## 2020-09-21 RX ADMIN — INSULIN LISPRO 3 UNITS: 100 INJECTION, SOLUTION INTRAVENOUS; SUBCUTANEOUS at 22:36

## 2020-09-21 RX ADMIN — INSULIN LISPRO 3 UNITS: 100 INJECTION, SOLUTION INTRAVENOUS; SUBCUTANEOUS at 19:14

## 2020-09-21 RX ADMIN — LEVETIRACETAM 500 MG: 500 TABLET, FILM COATED ORAL at 17:31

## 2020-09-21 RX ADMIN — LABETALOL 20 MG/4 ML (5 MG/ML) INTRAVENOUS SYRINGE 10 MG: at 17:29

## 2020-09-21 RX ADMIN — ACETAMINOPHEN 975 MG: 325 TABLET, FILM COATED ORAL at 17:30

## 2020-09-21 RX ADMIN — HYDRALAZINE HYDROCHLORIDE 5 MG: 20 INJECTION INTRAMUSCULAR; INTRAVENOUS at 22:44

## 2020-09-21 RX ADMIN — ENOXAPARIN SODIUM 40 MG: 40 INJECTION SUBCUTANEOUS at 17:31

## 2020-09-21 RX ADMIN — LIDOCAINE 5% 1 PATCH: 700 PATCH TOPICAL at 08:11

## 2020-09-21 RX ADMIN — FLUOXETINE 20 MG: 20 CAPSULE ORAL at 21:30

## 2020-09-21 RX ADMIN — INSULIN LISPRO 1 UNITS: 100 INJECTION, SOLUTION INTRAVENOUS; SUBCUTANEOUS at 19:14

## 2020-09-21 RX ADMIN — HYDRALAZINE HYDROCHLORIDE 10 MG: 20 INJECTION INTRAMUSCULAR; INTRAVENOUS at 18:34

## 2020-09-21 NOTE — ASSESSMENT & PLAN NOTE
Lab Results   Component Value Date    HGBA1C 7 0 (H) 10/09/2019       No results for input(s): POCGLU in the last 72 hours  Blood Sugar Average: Last 72 hrs:   recheck A1c as patient is overdue  Monitor on Accu-Cheks with sliding scale coverage  Continue home doses of Lantus and lispro for now    Hold metformin in the context of recent CT scan dye

## 2020-09-21 NOTE — ED NOTES
Visitor at bedside, Wilfredo Steele, states she is the [facility] for this patient and wants to be updated on plan of care  Provided update to visitor with permission from patient  Checked with registration and unable to find any documentation listing this person as medical power of  for patient  Spoke with visitor again she states "it should be in there" advised I was unable to locate it and if she would provider the documentation we could have it scanned into the medical record for her  Visitor is calling patient's  for copies       Saman Ornelas RN  09/21/20 0845

## 2020-09-21 NOTE — ED NOTES
Attempted to ambulate patient using walker  Unable to step away from bedside due to pain       Salina Haro RN  09/21/20 9775

## 2020-09-21 NOTE — ED PROVIDER NOTES
History  Chief Complaint   Patient presents with    Back Pain     patient presents by ems for back pain fell today  has hx of falling due to muscle spasms      Patient is a 80-year-old female with a history of diabetes, hypertension, stroke and pheochromocytoma who presents with back pain  Patient states that she has chronic back pain but it seemed worse this morning  She had pain while laying in bed and tried to get up and ambulate to the restroom  After taking a few steps, she had to lower herself to her knees  She crawled to the restroom and crawled back to her room  She called EMS and she was brought to the emergency department for evaluation  Patient states that her chronic back pain waxes and wanes  She denies any recent trauma or injuries  The pain does not radiate  She denies any associated numbness, tingling or weakness in the lower extremities  She also denies any bowel or bladder dysfunction  History provided by:  Patient  Back Pain   Location:  Lumbar spine  Radiates to:  Does not radiate  Pain severity:  Moderate  Pain is:  Same all the time  Progression:  Waxing and waning  Chronicity:  Chronic  Ineffective treatments:  None tried  Associated symptoms: no abdominal pain, no bladder incontinence, no bowel incontinence, no chest pain, no dysuria, no fever, no headaches, no numbness, no paresthesias, no pelvic pain, no tingling and no weakness        Prior to Admission Medications   Prescriptions Last Dose Informant Patient Reported? Taking?    CLONIDINE HCL PO 9/20/2020 at Unknown time  Yes Yes   Sig: Take 0 3 mg by mouth daily   FLUoxetine (PROzac) 20 MG tablet 9/20/2020 at Unknown time Self Yes Yes   Sig: Take 20 mg by mouth daily at bedtime     Glucosamine-Chondroit-Vit C-Mn (GLUCOSAMINE CHONDR 1500 COMPLX PO) 9/20/2020 at Unknown time Self Yes Yes   Sig: Take 1 tablet by mouth 2 (two) times a day   Omega-3 1000 MG CAPS 9/20/2020 at Unknown time  Yes Yes   Sig: Take by mouth 2 (two) times a day   amLODIPine (NORVASC) 10 mg tablet 9/20/2020 at Unknown time Self Yes Yes   Sig: Take 10 mg by mouth daily   atorvastatin (LIPITOR) 20 mg tablet 9/20/2020 at Unknown time Self Yes Yes   Sig: Take 40 mg by mouth daily    clopidogrel (PLAVIX) 75 mg tablet 9/20/2020 at Unknown time  Yes Yes   Sig: TAKE 1 TABLET BY MOUTH  DAILY   fenofibrate (TRICOR) 145 mg tablet 9/20/2020 at Unknown time Self Yes Yes   Sig: Take 145 mg by mouth daily   hydrochlorothiazide (HYDRODIURIL) 25 mg tablet Not Taking at Unknown time Self Yes No   Sig: Take 25 mg by mouth daily   insulin glargine (LANTUS) 100 units/mL subcutaneous injection 9/20/2020 at Unknown time Self No Yes   Sig: Inject 7 Units under the skin daily at bedtime   insulin lispro (HumaLOG) 100 units/mL injection 9/20/2020 at Unknown time Self Yes Yes   Sig: Inject 3 Units under the skin 3 (three) times a day before meals   levETIRAcetam (KEPPRA) 250 mg tablet 9/20/2020 at Unknown time Self Yes Yes   Sig: Take 500 mg by mouth 2 (two) times a day   levothyroxine 75 mcg tablet 9/20/2020 at Unknown time Self Yes Yes   Sig: Take 75 mcg by mouth daily in the early morning     lisinopril (ZESTRIL) 30 mg tablet 9/20/2020 at Unknown time Self Yes Yes   Sig: Take 30 mg by mouth daily   metFORMIN (GLUCOPHAGE) 1000 MG tablet 9/20/2020 at Unknown time Self Yes Yes   Sig: Take 1,000 mg by mouth daily with breakfast     warfarin (COUMADIN) 2 mg tablet Not Taking at Unknown time  No No   Sig: Take 3 tablets (6 mg total) by mouth daily Until next blood draw   Patient not taking: Reported on 9/21/2020      Facility-Administered Medications: None       Past Medical History:   Diagnosis Date    Aneurysm of basilar artery ([facility] ) 07/2015    Arthritis     Cardiac arrest ([facility] ) 2014    Complex partial seizure with impairment of consciousness at onset [facility]) 07/2017    Dependent on wheelchair     "at times"    Diabetes mellitus ([facility] )     IDDM    Disease of thyroid gland     Hyperlipidemia     Hypertension     Left hip pain     Left knee pain     Muscle weakness     right sided weakness    Risk for falls     Seizures ([facility] )     "pt reports none recent"    Sleep apnea     "doesn't use machine"    Stenosis of middle cerebral artery 07/2015    Stroke ([facility] )     B BG, R frontal, L parietal, L pontine    Unsteady gait     Uses walker        Past Surgical History:   Procedure Laterality Date    ABDOMINAL SURGERY      benign mass per pt    DILATION AND CURETTAGE OF UTERUS      HYSTERECTOMY      RI TOTAL HIP ARTHROPLASTY Left 4/27/2018    Procedure: ARTHROPLASTY HIP TOTAL;  Surgeon: Nathalia Ahn MD;  Location: [facility];  Service: Orthopedics    TONSILLECTOMY      WISDOM TOOTH EXTRACTION         Family History   Problem Relation Age of Onset    Arthritis Mother     Heart disease Father     Diabetes Father      I have reviewed and agree with the history as documented  E-Cigarette/Vaping     E-Cigarette/Vaping Substances     Social History     Tobacco Use    Smoking status: Never Smoker    Smokeless tobacco: Never Used   Substance Use Topics    Alcohol use: Yes     Comment: social    Drug use: No       Review of Systems   Constitutional: Negative for chills, diaphoresis and fever  HENT: Negative for nosebleeds, sore throat and trouble swallowing  Eyes: Negative for photophobia, pain and visual disturbance  Respiratory: Negative for cough, chest tightness and shortness of breath  Cardiovascular: Negative for chest pain, palpitations and leg swelling  Gastrointestinal: Negative for abdominal pain, bowel incontinence, constipation, diarrhea, nausea and vomiting  Endocrine: Negative for polydipsia and polyuria  Genitourinary: Negative for bladder incontinence, difficulty urinating, dysuria, hematuria, pelvic pain, vaginal bleeding and vaginal discharge  Musculoskeletal: Positive for back pain  Negative for neck pain and neck stiffness     Skin: Negative for pallor and rash  Neurological: Negative for dizziness, tingling, seizures, weakness, light-headedness, numbness, headaches and paresthesias  All other systems reviewed and are negative  Physical Exam  Physical Exam  Vitals signs and nursing note reviewed  Constitutional:       General: She is not in acute distress  Appearance: She is well-developed  HENT:      Head: Normocephalic and atraumatic  Eyes:      Pupils: Pupils are equal, round, and reactive to light  Neck:      Musculoskeletal: Normal range of motion and neck supple  Cardiovascular:      Rate and Rhythm: Normal rate and regular rhythm  Pulses: Normal pulses  Heart sounds: Normal heart sounds  Pulmonary:      Effort: Pulmonary effort is normal  No respiratory distress  Breath sounds: Normal breath sounds  Abdominal:      General: There is no distension  Palpations: Abdomen is soft  Abdomen is not rigid  Tenderness: There is no abdominal tenderness  There is no guarding or rebound  Musculoskeletal: Normal range of motion  Right shoulder: She exhibits tenderness (Bilateral paraspinal regions)  Lymphadenopathy:      Cervical: No cervical adenopathy  Skin:     General: Skin is warm and dry  Capillary Refill: Capillary refill takes less than 2 seconds  Neurological:      Mental Status: She is alert and oriented to person, place, and time  Cranial Nerves: No cranial nerve deficit  Sensory: No sensory deficit           Vital Signs  ED Triage Vitals [09/21/20 0751]   Temperature Pulse Respirations Blood Pressure SpO2   98 2 °F (36 8 °C) 68 18 (!) 218/98 100 %      Temp Source Heart Rate Source Patient Position - Orthostatic VS BP Location FiO2 (%)   Oral Monitor Lying Right arm --      Pain Score       5           Vitals:    09/21/20 1003 09/21/20 1015 09/21/20 1049 09/21/20 1613   BP: (!) 218/94 (!) 218/94 (!) 214/84 (!) 211/86   Pulse: 64 58  71   Patient Position - Orthostatic VS: Lying   Lying         Visual Acuity      ED Medications  Medications   lidocaine (LIDODERM) 5 % patch 1 patch (1 patch Topical Medication Applied 9/21/20 0811)   insulin lispro (HumaLOG) 100 units/mL subcutaneous injection 1-5 Units (has no administration in time range)   ondansetron (ZOFRAN) injection 4 mg (has no administration in time range)   enoxaparin (LOVENOX) subcutaneous injection 40 mg (has no administration in time range)   acetaminophen (TYLENOL) tablet 975 mg (975 mg Oral Given 9/21/20 0810)   morphine (MSIR) IR tablet 15 mg (15 mg Oral Given 9/21/20 1029)   hydrALAZINE (APRESOLINE) injection 5 mg (5 mg Intravenous Given 9/21/20 1049)   iohexol (OMNIPAQUE) 350 MG/ML injection (SINGLE-DOSE) 100 mL (100 mL Intravenous Given 9/21/20 1105)       Diagnostic Studies  Results Reviewed     Procedure Component Value Units Date/Time    Urine Microscopic [[id number]]  (Abnormal) Collected:  09/21/20 1559    Lab Status:  Final result Specimen:  Urine, Straight Cath Updated:  09/21/20 1623     RBC, UA 0-1 /hpf      WBC, UA 2-4 /hpf      Epithelial Cells Occasional /hpf      Bacteria, UA None Seen /hpf     UA (URINE) with reflex to Scope [[id number]]  (Abnormal) Collected:  09/21/20 1559    Lab Status:  Final result Specimen:  Urine, Straight Cath Updated:  09/21/20 1607     Color, UA Yellow     Clarity, UA Clear     Specific [place], UA 1 020     pH, UA 5 5     Leukocytes, UA Negative     Nitrite, UA Negative     Protein, UA >=300 mg/dl      Glucose,  (1/10%) mg/dl      Ketones, UA Trace mg/dl      Urobilinogen, UA 0 2 E U /dl      Bilirubin, UA Negative     Blood, UA Moderate    Platelet count [[id number]]     Lab Status:  No result Specimen:  Blood     Comprehensive metabolic panel [[id number]]  (Abnormal) Collected:  09/21/20 0823    Lab Status:  Final result Specimen:  Blood from Arm, Right Updated:  09/21/20 0857     Sodium 141 mmol/L      Potassium 3 5 mmol/L      Chloride 104 mmol/L      CO2 24 mmol/L ANION GAP 13 mmol/L      BUN 18 mg/dL      Creatinine 0 83 mg/dL      Glucose 206 mg/dL      Calcium 9 0 mg/dL      Corrected Calcium 9 5 mg/dL      AST 22 U/L      ALT 28 U/L      Alkaline Phosphatase 59 U/L      Total Protein 7 0 g/dL      Albumin 3 4 g/dL      Total Bilirubin 0 90 mg/dL      eGFR 72 ml/min/1 73sq m     Narrative:       National Kidney Disease Foundation guidelines for Chronic Kidney Disease (CKD):     Stage 1 with normal or high GFR (GFR > 90 mL/min/1 73 square meters)    Stage 2 Mild CKD (GFR = 60-89 mL/min/1 73 square meters)    Stage 3A Moderate CKD (GFR = 45-59 mL/min/1 73 square meters)    Stage 3B Moderate CKD (GFR = 30-44 mL/min/1 73 square meters)    Stage 4 Severe CKD (GFR = 15-29 mL/min/1 73 square meters)    Stage 5 End Stage CKD (GFR <15 mL/min/1 73 square meters)  Note: GFR calculation is accurate only with a steady state creatinine    CBC and differential [[id number]] Collected:  09/21/20 0823    Lab Status:  Final result Specimen:  Blood from Arm, Right Updated:  09/21/20 0836     WBC 6 91 Thousand/uL      RBC 4 52 Million/uL      Hemoglobin 13 9 g/dL      Hematocrit 41 5 %      MCV 92 fL      MCH 30 8 pg      MCHC 33 5 g/dL      RDW 13 4 %      MPV 11 0 fL      Platelets 224 Thousands/uL      nRBC 0 /100 WBCs      Neutrophils Relative 65 %      Immat GRANS % 0 %      Lymphocytes Relative 20 %      Monocytes Relative 11 %      Eosinophils Relative 3 %      Basophils Relative 1 %      Neutrophils Absolute 4 51 Thousands/µL      Immature Grans Absolute 0 02 Thousand/uL      Lymphocytes Absolute 1 36 Thousands/µL      Monocytes Absolute 0 78 Thousand/µL      Eosinophils Absolute 0 20 Thousand/µL      Basophils Absolute 0 04 Thousands/µL                  CTA dissection protocol chest/abdomen/pelvis   Final Result by Birdie Lozoya MD (09/21 0465)      No acute pathology  No aortic aneurysm or dissection        Postsurgical changes in the retroperitoneum again seen, with a stable small left-sided nodule since 2016  Cholelithiasis  Workstation performed: [id number]         XR spine lumbar 2 or 3 views injury   Final Result by Georgina Zavala MD (09/21 0900)      Mild degenerative changes with increasing grade 1 spondylolisthesis of L5 on L6         Workstation performed: [id number]                    Procedures  Procedures         ED Course  ED Course as of Sep 21 1654   Mon Sep 21, 2020   0818 Unable to obtain complete visualization of the abdominal aorta with bedside ultrasound  Limited by bowel gas  1002 Patient continues to have back pain with minimal improvement  Will give p o  Morphine  [phone] Patient remains hypertensive with back pain  Will check CT with contrast to rule out aortic pathology  91 21 06 Patient states that pain is slightly better   is uncomfortable with discharge given her inability to ambulate  Will attempt to ambulate in the emergency department  SBIRT 22yo+      Most Recent Value   SBIRT (22 yo +)   In order to provide better care to our patients, we are screening all of our patients for alcohol and drug use  Would it be okay to ask you these screening questions? No Filed at: 09/21/2020 0753   Initial Alcohol Screen: US AUDIT-C    1  How often do you have a drink containing alcohol?  0 Filed at: 09/21/2020 0753   2  How many drinks containing alcohol do you have on a typical day you are drinking? 0 Filed at: 09/21/2020 0753   3a  Male UNDER 65: How often do you have five or more drinks on one occasion? 0 Filed at: 09/21/2020 0753   3b  FEMALE Any Age, or MALE 65+: How often do you have 4 or more drinks on one occassion? 0 Filed at: 09/21/2020 0753   Audit-C Score  0 Filed at: 09/21/2020 0898   TAI: How many times in the past year have you    Used an illegal drug or used a prescription medication for non-medical reasons?   Never Filed at: 09/21/2020 0753                  MDM  Number of Diagnoses or Management Options  Ambulatory dysfunction: new and requires workup  Chronic bilateral low back pain without sciatica: new and requires workup  Diagnosis management comments: Patient with a history of chronic back pain presents with acute worsening of lower back pain today  She denies recent trauma  Patient was persistently hypertensive in the emergency department and I was unable to visualize abdominal aorta on bedside ultrasound  Therefore CT was performed that shows no acute pathology  Attempted symptomatic treatment and ambulation  However patient failed ambulatory trial   Will hospitalize for intractable back pain and ambulatory dysfunction  Amount and/or Complexity of Data Reviewed  Clinical lab tests: ordered and reviewed  Tests in the radiology section of CPT®: ordered and reviewed  Tests in the medicine section of CPT®: ordered and reviewed    Risk of Complications, Morbidity, and/or Mortality  Presenting problems: high  Diagnostic procedures: high  Management options: high    Patient Progress  Patient progress: stable      Disposition  Final diagnoses:   Chronic bilateral low back pain without sciatica   Ambulatory dysfunction     Time reflects when diagnosis was documented in both MDM as applicable and the Disposition within this note     Time User Action Codes Description Comment    9/21/2020  1:41 PM Burke Bowels Add [M54 5,  G89 29] Chronic bilateral low back pain without sciatica     9/21/2020  1:41 PM Ladi Bowels Add [R26 2] Ambulatory dysfunction       ED Disposition     ED Disposition Condition Date/Time Comment    Admit Stable Mon Sep 21, 2020  1:40 PM Case was discussed with LEE and the patient's admission status was agreed to be Admission Status: observation status to the service of Dr Abhay Lopez  Follow-up Information    None         Patient's Medications   Discharge Prescriptions    No medications on file     No discharge procedures on file      PDMP Review None          ED Provider  Electronically Signed by           Quique Major DO  09/21/20 5348

## 2020-09-21 NOTE — ASSESSMENT & PLAN NOTE
· Patient presented with 1 week of low back pain which appears to be fairly focal and non-radiating, also today with inability to ambulate due to pain  No clear inciting event  Seems to be somewhat relieved with Tylenol at home  · X-ray shows some lumbar degenerative disease without acute fracture  CT scan done in the context of CTA to rule out dissection confirmed same  · In the absence of any radiculopathic findings we can likely hold off on ordering MRI of the lumbar sacral spine temporarily and orthopedic consult, but should she fail conservative measures this may be needed  · Consult physical therapy  · Pain regimen as follows:  Around the clock Tylenol 975 t i d , topical menthol and lidocaine daily, Robaxin 500 mg q i d  Around the clock, Toradol 15 mg IV q 6 hours p r n  Moderate breakthrough pain, oxycodone 5 mg for severe pain    Also utilize stretching and heating pad

## 2020-09-21 NOTE — ASSESSMENT & PLAN NOTE
· Patient was severely elevated blood pressure on admission of 218/98  This is likely due to her known history of difficult to treat hypertension requiring multiple agents as well as exacerbated by pain  · Continue home regimen of Norvasc, clonidine, HCTZ, lisinopril   P r n  IV labetalol will be available    · Pain control regimen

## 2020-09-21 NOTE — H&P
H&P- [facility] 1950, 72 y o  female MRN: [id number]    Unit/Bed#: ED 25 Encounter: [id number]    Primary Care Provider: Albino Kitchen DO   Date and time admitted to hospital: 9/21/2020  7:47 AM  * Acute low back pain  Assessment & Plan  · Patient presented with 1 week of low back pain which appears to be fairly focal and non-radiating, also today with inability to ambulate due to pain  No clear inciting event  Seems to be somewhat relieved with Tylenol at home  · X-ray shows some lumbar degenerative disease without acute fracture  CT scan done in the context of CTA to rule out dissection confirmed same  · In the absence of any radiculopathic findings we can likely hold off on ordering MRI of the lumbar sacral spine temporarily and orthopedic consult, but should she fail conservative measures this may be needed  · Consult physical therapy  · Pain regimen as follows:  Around the clock Tylenol 975 t i d , topical menthol and lidocaine daily, Robaxin 500 mg q i d  Around the clock, Toradol 15 mg IV q 6 hours p r n  Moderate breakthrough pain, oxycodone 5 mg for severe pain  Also utilize stretching and heating pad    Hypertensive urgency  Assessment & Plan  · Patient was severely elevated blood pressure on admission of 218/98  This is likely due to her known history of difficult to treat hypertension requiring multiple agents as well as exacerbated by pain  · Continue home regimen of Norvasc, clonidine, HCTZ, lisinopril   P r n  IV labetalol will be available  · Pain control regimen    Urinary retention  Assessment & Plan  · Patient with complaints of difficulty passing urine today  Noted to have bladder scan with 390 mL in the ER  · Straight cath x1 now    Place patient on urinary retention protocol  · Obtain UA C&S    DM2 (diabetes mellitus, type 2) ([facility] )  Assessment & Plan  Lab Results   Component Value Date    HGBA1C 7 0 (H) 10/09/2019       No results for input(s): POCGLU in the last 72 hours  Blood Sugar Average: Last 72 hrs:   recheck A1c as patient is overdue  Monitor on Accu-Cheks with sliding scale coverage  Continue home doses of Lantus and lispro for now  Hold metformin in the context of recent CT scan dye    History of stroke  Assessment & Plan  · Continue Plavix    VTE Prophylaxis: Enoxaparin (Lovenox)   Code Status:  Full code per discussion with patient  POLST: POLST is not applicable to this patient  Discussion with family:     Anticipated Length of Stay:  Patient will be admitted on an Observation basis with an anticipated length of stay of  less than 2 midnights  Justification for Hospital Stay:  Observation overnight to achieve pain control and physical therapy evaluation    Total Time for Visit, including Counseling / Coordination of Care: 1 hour  Greater than 50% of this total time spent on direct patient counseling and coordination of care  Chief Complaint:   Back pain    History of Present Illness:    Alisha García is a 72 y o  female who presents with 1 week of severe back pain  This morning she states that she was ambulating to the bathroom and could not take the pain and lowered herself to the her knees, crawling out of the bathroom  She states that there was no specific triggering event to the back pain last week but believed it was possibly due to cold air conditioning/fan blowing on her back and causing her to be stiff  She adamantly denies any traumatic event or any heavy lifting, pushing, or pulling which occurred prior to this  She reported that the pain was initially on the right side and then more so on the left lower back and slightly into the buttock  She does not have any significant history of back pain requiring any use of pain medication or previous orthopedic input  She noted the pain when she rolled over that morning 1 week ago and it did not resolve over the course of this past week    She was spending more time on her couch than usual which also caused the pain to get worse  She reports some improvement in the pain when she takes Tylenol but became concerned because she was taking Tylenol 4 times per day  She denies any radiation of the pain down her leg or any associated paresthesias, saddle anesthesia, weakness in her legs or foot drop, or new bowel or bladder incontinence  However, in the ER she was having difficulty passing urine and was noted to have urinary retention  Review of Systems:    Review of Systems   Constitutional: Positive for activity change  Negative for appetite change, chills, diaphoresis, fatigue, fever and unexpected weight change  HENT: Negative for trouble swallowing  Respiratory: Negative for cough, chest tightness, shortness of breath and wheezing  Cardiovascular: Negative for chest pain, palpitations and leg swelling  Gastrointestinal: Negative for abdominal pain, constipation, nausea and vomiting  Occasional loose stools   Genitourinary: Positive for difficulty urinating  Negative for dysuria, flank pain, frequency and hematuria  Denies burning with urination but reports some difficulty passing urine at times   Musculoskeletal: Positive for back pain and gait problem  Negative for arthralgias, joint swelling, myalgias and neck pain  No falls   Skin: Negative for color change, pallor, rash and wound  Neurological: Negative for dizziness, tremors, syncope, facial asymmetry, speech difficulty, weakness, light-headedness, numbness and headaches  Psychiatric/Behavioral: Negative for confusion         Past Medical and Surgical History:     Past Medical History:   Diagnosis Date    Aneurysm of basilar artery ([facility] ) 07/2015    Arthritis     Cardiac arrest ([facility] ) 2014    Complex partial seizure with impairment of consciousness at onset [facility]) 07/2017    Dependent on wheelchair     "at times"    Diabetes mellitus ([facility] )     IDDM    Disease of thyroid gland     Hyperlipidemia     Hypertension     Left hip pain     Left knee pain     Muscle weakness     right sided weakness    Risk for falls     Seizures ([address] )     "pt reports none recent"    Sleep apnea     "doesn't use machine"    Stenosis of middle cerebral artery 07/2015    Stroke (HCC)     B BG, R frontal, L parietal, L pontine    Unsteady gait     Uses walker        Past Surgical History:   Procedure Laterality Date    ABDOMINAL SURGERY      benign mass per pt    DILATION AND CURETTAGE OF UTERUS      HYSTERECTOMY      SC TOTAL HIP ARTHROPLASTY Left 4/27/2018    Procedure: ARTHROPLASTY HIP TOTAL;  Surgeon: Yeni Carroll MD;  Location: [facility];  Service: Orthopedics    TONSILLECTOMY      WISDOM TOOTH EXTRACTION         Meds/Allergies:    Prior to Admission medications    Medication Sig Start Date End Date Taking?  Authorizing Provider   amLODIPine (NORVASC) 10 mg tablet Take 10 mg by mouth daily   Yes Historical Provider, MD   atorvastatin (LIPITOR) 20 mg tablet Take 40 mg by mouth daily    Yes Historical Provider, MD   CLONIDINE HCL PO Take 0 3 mg by mouth daily   Yes Historical Provider, MD   clopidogrel (PLAVIX) 75 mg tablet TAKE 1 TABLET BY MOUTH  DAILY 7/9/20  Yes Historical Provider, MD   fenofibrate (TRICOR) 145 mg tablet Take 145 mg by mouth daily   Yes Historical Provider, MD   FLUoxetine (PROzac) 20 MG tablet Take 20 mg by mouth daily at bedtime     Yes Historical Provider, MD   Glucosamine-Chondroit-Vit C-Mn (GLUCOSAMINE CHONDR 1500 COMPLX PO) Take 1 tablet by mouth 2 (two) times a day   Yes Historical Provider, MD   insulin glargine (LANTUS) 100 units/mL subcutaneous injection Inject 7 Units under the skin daily at bedtime 7/31/17  Yes Fela Rios MD   insulin lispro (HumaLOG) 100 units/mL injection Inject 3 Units under the skin 3 (three) times a day before meals   Yes Historical Provider, MD   levETIRAcetam (KEPPRA) 250 mg tablet Take 500 mg by mouth 2 (two) times a day   Yes Historical Provider, MD levothyroxine 75 mcg tablet Take 75 mcg by mouth daily in the early morning     Yes Historical Provider, MD   lisinopril (ZESTRIL) 30 mg tablet Take 30 mg by mouth daily   Yes Historical Provider, MD   metFORMIN (GLUCOPHAGE) 1000 MG tablet Take 1,000 mg by mouth daily with breakfast     Yes Historical Provider, MD   [place]-3 1000 MG CAPS Take by mouth 2 (two) times a day   Yes Historical Provider, MD   hydrochlorothiazide (HYDRODIURIL) 25 mg tablet Take 25 mg by mouth daily    Historical Provider, MD   warfarin (COUMADIN) 2 mg tablet Take 3 tablets (6 mg total) by mouth daily Until next blood draw  Patient not taking: Reported on 9/21/2020 4/30/18   Silvio Matos PA-C     I have reviewed home medications using allscripts  Allergies: No Known Allergies    Social History:     Marital Status: /Civil Union   Occupation:   Patient Pre-hospital Living Situation: lives with   Patient Pre-hospital Level of Mobility: using cane this past week due to back pain  Patient Pre-hospital Diet Restrictions: none  Substance Use History:   Social History     Substance and Sexual Activity   Alcohol Use Yes    Comment: social     Social History     Tobacco Use   Smoking Status Never Smoker   Smokeless Tobacco Never Used     Social History     Substance and Sexual Activity   Drug Use No       Family History:    non-contributory    Physical Exam:     Vitals:   Blood Pressure: (!) 214/84 (09/21/20 1049)  Pulse: 58 (09/21/20 1015)  Temperature: 98 2 °F (36 8 °C) (09/21/20 0751)  Temp Source: Oral (09/21/20 0751)  Respirations: 18 (09/21/20 1015)  Weight - Scale: 74 7 kg (164 lb 10 9 oz) (09/21/20 0750)  SpO2: 95 % (09/21/20 1015)    Physical Exam  Vitals signs reviewed  Constitutional:       Appearance: She is obese  She is not ill-appearing, toxic-appearing or diaphoretic  Comments: Clearly uncomfortable appearing at times in bed, with facial grimace   HENT:      Head: Normocephalic and atraumatic        Nose: No congestion or rhinorrhea  Mouth/Throat:      Pharynx: Oropharynx is clear  No oropharyngeal exudate or posterior oropharyngeal erythema  Eyes:      General: No scleral icterus  Right eye: No discharge  Left eye: No discharge  Neck:      Musculoskeletal: Neck supple  Cardiovascular:      Rate and Rhythm: Normal rate and regular rhythm  Heart sounds: No murmur  Pulmonary:      Effort: Pulmonary effort is normal  No respiratory distress  Breath sounds: Normal breath sounds  No stridor  No wheezing, rhonchi or rales  Abdominal:      General: Bowel sounds are normal  There is no distension  Palpations: Abdomen is soft  Tenderness: There is no abdominal tenderness  There is no guarding  Musculoskeletal:         General: No deformity  Right lower leg: No edema  Left lower leg: No edema  Skin:     General: Skin is warm and dry  Coloration: Skin is not jaundiced or pale  Findings: Bruising (Mild area of ecchymosis on her abdomen due to injecting insulin) present  No erythema, lesion or rash  Neurological:      Mental Status: She is alert  Comments: Awake alert interactive  She has some difficulty recounting history and has some mild subtle dysarthric quality to her speech  With leg raise she had pain in her right thigh upon raising her right leg and had no specific pain with left leg raise  Neither elicited pain in her back  Her sensation appear to be intact and symmetric in bilateral lower extremities  She follows commands appropriately  Psychiatric:         Mood and Affect: Mood normal        Additional Data:     Lab Results: I have personally reviewed pertinent reports        Results from last 7 days   Lab Units 09/21/20  0823   WBC Thousand/uL 6 91   HEMOGLOBIN g/dL 13 9   HEMATOCRIT % 41 5   PLATELETS Thousands/uL 268   NEUTROS PCT % 65   LYMPHS PCT % 20   MONOS PCT % 11   EOS PCT % 3     Results from last 7 days   Lab Units 09/21/20  0823   SODIUM mmol/L 141   POTASSIUM mmol/L 3 5   CHLORIDE mmol/L 104   CO2 mmol/L 24   BUN mg/dL 18   CREATININE mg/dL 0 83   ANION GAP mmol/L 13   CALCIUM mg/dL 9 0   ALBUMIN g/dL 3 4*   TOTAL BILIRUBIN mg/dL 0 90   ALK PHOS U/L 59   ALT U/L 28   AST U/L 22   GLUCOSE RANDOM mg/dL 206*                       Imaging: I have personally reviewed pertinent reports  CTA dissection protocol chest/abdomen/pelvis   Final Result by Suraj Estevez MD (09/21 1159)      No acute pathology  No aortic aneurysm or dissection  Postsurgical changes in the retroperitoneum again seen, with a stable small left-sided nodule since 2016  Cholelithiasis  Workstation performed: [id number]         XR spine lumbar 2 or 3 views injury   Final Result by Nereida Ross MD (09/21 0900)      Mild degenerative changes with increasing grade 1 spondylolisthesis of L5 on L6         Workstation performed: [id number]             EKG, Pathology, and Other Studies Reviewed on Admission:   ·     Allscripts / Epic Records Reviewed: Yes     ** Please Note: This note has been constructed using a voice recognition system   **

## 2020-09-21 NOTE — ED NOTES
Provided patient with commode and assisted to it  Patient unable to urinate at this time         Lady Coffey RN  09/21/20 1546

## 2020-09-21 NOTE — ASSESSMENT & PLAN NOTE
· Patient with complaints of difficulty passing urine today  Noted to have bladder scan with 390 mL in the ER  · Straight cath x1 now    Place patient on urinary retention protocol  · Obtain UA C&S

## 2020-09-22 ENCOUNTER — APPOINTMENT (OUTPATIENT)
Dept: CT IMAGING | Facility: HOSPITAL | Age: 70
DRG: 552 | End: 2020-09-22
Payer: MEDICARE

## 2020-09-22 LAB
ANION GAP SERPL CALCULATED.3IONS-SCNC: 9 MMOL/L (ref 4–13)
BUN SERPL-MCNC: 22 MG/DL (ref 5–25)
CALCIUM SERPL-MCNC: 8.7 MG/DL (ref 8.3–10.1)
CHLORIDE SERPL-SCNC: 104 MMOL/L (ref 100–108)
CO2 SERPL-SCNC: 25 MMOL/L (ref 21–32)
CREAT SERPL-MCNC: 0.97 MG/DL (ref 0.6–1.3)
EST. AVERAGE GLUCOSE BLD GHB EST-MCNC: 180 MG/DL
GFR SERPL CREATININE-BSD FRML MDRD: 60 ML/MIN/1.73SQ M
GLUCOSE P FAST SERPL-MCNC: 347 MG/DL (ref 65–99)
GLUCOSE SERPL-MCNC: 126 MG/DL (ref 65–140)
GLUCOSE SERPL-MCNC: 168 MG/DL (ref 65–140)
GLUCOSE SERPL-MCNC: 265 MG/DL (ref 65–140)
GLUCOSE SERPL-MCNC: 299 MG/DL (ref 65–140)
GLUCOSE SERPL-MCNC: 346 MG/DL (ref 65–140)
GLUCOSE SERPL-MCNC: 347 MG/DL (ref 65–140)
HBA1C MFR BLD: 7.9 %
POTASSIUM SERPL-SCNC: 3.5 MMOL/L (ref 3.5–5.3)
SODIUM SERPL-SCNC: 138 MMOL/L (ref 136–145)

## 2020-09-22 PROCEDURE — 82948 REAGENT STRIP/BLOOD GLUCOSE: CPT

## 2020-09-22 PROCEDURE — 97110 THERAPEUTIC EXERCISES: CPT

## 2020-09-22 PROCEDURE — 80048 BASIC METABOLIC PNL TOTAL CA: CPT | Performed by: PHYSICIAN ASSISTANT

## 2020-09-22 PROCEDURE — 97163 PT EVAL HIGH COMPLEX 45 MIN: CPT

## 2020-09-22 PROCEDURE — 70450 CT HEAD/BRAIN W/O DYE: CPT

## 2020-09-22 PROCEDURE — 83036 HEMOGLOBIN GLYCOSYLATED A1C: CPT | Performed by: PHYSICIAN ASSISTANT

## 2020-09-22 PROCEDURE — G1004 CDSM NDSC: HCPCS

## 2020-09-22 PROCEDURE — 99232 SBSQ HOSP IP/OBS MODERATE 35: CPT | Performed by: PHYSICIAN ASSISTANT

## 2020-09-22 RX ORDER — HYDROCHLOROTHIAZIDE 25 MG/1
25 TABLET ORAL DAILY
Status: DISCONTINUED | OUTPATIENT
Start: 2020-09-22 | End: 2020-09-23 | Stop reason: HOSPADM

## 2020-09-22 RX ORDER — INSULIN GLARGINE 100 [IU]/ML
12 INJECTION, SOLUTION SUBCUTANEOUS
Status: DISCONTINUED | OUTPATIENT
Start: 2020-09-22 | End: 2020-09-23 | Stop reason: HOSPADM

## 2020-09-22 RX ORDER — CLONIDINE HYDROCHLORIDE 0.1 MG/1
0.3 TABLET ORAL DAILY
Status: DISCONTINUED | OUTPATIENT
Start: 2020-09-22 | End: 2020-09-23 | Stop reason: HOSPADM

## 2020-09-22 RX ORDER — AMLODIPINE BESYLATE 10 MG/1
10 TABLET ORAL DAILY
Status: DISCONTINUED | OUTPATIENT
Start: 2020-09-22 | End: 2020-09-23 | Stop reason: HOSPADM

## 2020-09-22 RX ADMIN — CLONIDINE HYDROCHLORIDE 0.3 MG: 0.1 TABLET ORAL at 01:48

## 2020-09-22 RX ADMIN — LABETALOL 20 MG/4 ML (5 MG/ML) INTRAVENOUS SYRINGE 10 MG: at 00:40

## 2020-09-22 RX ADMIN — METHOCARBAMOL 500 MG: 500 TABLET ORAL at 00:41

## 2020-09-22 RX ADMIN — MENTHOL, METHYL SALICYLATE 1 APPLICATION: 10; 15 CREAM TOPICAL at 09:35

## 2020-09-22 RX ADMIN — LISINOPRIL 30 MG: 20 TABLET ORAL at 01:49

## 2020-09-22 RX ADMIN — METHOCARBAMOL 500 MG: 500 TABLET ORAL at 20:42

## 2020-09-22 RX ADMIN — FENOFIBRATE 145 MG: 145 TABLET, COATED ORAL at 09:36

## 2020-09-22 RX ADMIN — INSULIN GLARGINE 12 UNITS: 100 INJECTION, SOLUTION SUBCUTANEOUS at 22:00

## 2020-09-22 RX ADMIN — LEVETIRACETAM 500 MG: 500 TABLET, FILM COATED ORAL at 20:41

## 2020-09-22 RX ADMIN — CLOPIDOGREL BISULFATE 75 MG: 75 TABLET ORAL at 09:36

## 2020-09-22 RX ADMIN — ACETAMINOPHEN 975 MG: 325 TABLET, FILM COATED ORAL at 05:51

## 2020-09-22 RX ADMIN — HYDROCHLOROTHIAZIDE 25 MG: 25 TABLET ORAL at 09:36

## 2020-09-22 RX ADMIN — INSULIN LISPRO 1 UNITS: 100 INJECTION, SOLUTION INTRAVENOUS; SUBCUTANEOUS at 22:00

## 2020-09-22 RX ADMIN — LEVETIRACETAM 500 MG: 500 TABLET, FILM COATED ORAL at 09:36

## 2020-09-22 RX ADMIN — CLONIDINE HYDROCHLORIDE 0.3 MG: 0.1 TABLET ORAL at 09:36

## 2020-09-22 RX ADMIN — LEVOTHYROXINE SODIUM 75 MCG: 75 TABLET ORAL at 05:52

## 2020-09-22 RX ADMIN — ACETAMINOPHEN 975 MG: 325 TABLET, FILM COATED ORAL at 21:59

## 2020-09-22 RX ADMIN — INSULIN LISPRO 3 UNITS: 100 INJECTION, SOLUTION INTRAVENOUS; SUBCUTANEOUS at 09:35

## 2020-09-22 RX ADMIN — AMLODIPINE BESYLATE 10 MG: 10 TABLET ORAL at 01:49

## 2020-09-22 RX ADMIN — ACETAMINOPHEN 975 MG: 325 TABLET, FILM COATED ORAL at 13:11

## 2020-09-22 RX ADMIN — MENTHOL, METHYL SALICYLATE 1 APPLICATION: 10; 15 CREAM TOPICAL at 20:42

## 2020-09-22 RX ADMIN — ENOXAPARIN SODIUM 40 MG: 40 INJECTION SUBCUTANEOUS at 09:36

## 2020-09-22 RX ADMIN — LIDOCAINE 5% 1 PATCH: 700 PATCH TOPICAL at 09:36

## 2020-09-22 RX ADMIN — HYDROCHLOROTHIAZIDE 25 MG: 25 TABLET ORAL at 01:48

## 2020-09-22 RX ADMIN — MENTHOL, METHYL SALICYLATE 1 APPLICATION: 10; 15 CREAM TOPICAL at 16:48

## 2020-09-22 RX ADMIN — AMLODIPINE BESYLATE 10 MG: 10 TABLET ORAL at 09:36

## 2020-09-22 RX ADMIN — ATORVASTATIN CALCIUM 40 MG: 40 TABLET, FILM COATED ORAL at 09:36

## 2020-09-22 RX ADMIN — INSULIN LISPRO 6 UNITS: 100 INJECTION, SOLUTION INTRAVENOUS; SUBCUTANEOUS at 09:35

## 2020-09-22 RX ADMIN — FLUOXETINE 20 MG: 20 CAPSULE ORAL at 21:59

## 2020-09-22 RX ADMIN — LISINOPRIL 30 MG: 20 TABLET ORAL at 09:36

## 2020-09-22 RX ADMIN — INSULIN LISPRO 6 UNITS: 100 INJECTION, SOLUTION INTRAVENOUS; SUBCUTANEOUS at 13:14

## 2020-09-22 RX ADMIN — METHOCARBAMOL 500 MG: 500 TABLET ORAL at 13:12

## 2020-09-22 RX ADMIN — METHOCARBAMOL 500 MG: 500 TABLET ORAL at 05:51

## 2020-09-22 NOTE — QUICK NOTE
Patient has been persistently hypertensive, not responding to IV medications  Upon further history review, the patient did not take any of her antihypertensive medications orally this morning  Therefore instead of continuing to lucy her blood pressure solely with IV medications, will instead give her home oral regimen now and monitor for response in her blood pressure, and continue p r n  IV medications      Ayla Morrison PA-C

## 2020-09-22 NOTE — ASSESSMENT & PLAN NOTE
· Patient had difficulty passing urine in ER  Noted to have bladder scan with 390 mL   Straight cath x1 done   · Now has been urinating without difficulty and nursing reports no additional events of urinary retention on protocol  · UA revealed 3+ proteinuria, otherwise nitrate/wbc negative

## 2020-09-22 NOTE — UTILIZATION REVIEW
Initial Clinical Review  OBSERVATION 9/21/20 @1341 CONVERTED TO INPATIENT ADMISSION 9/22/20 @1403 DUE TO CONTINUED STAY REQUIRED TO EVALUATE AND TREAT PATIENT WITH ACUTE BACK PAIN CAUSING AMBULATION DYSFUNCTION AND HYPERTENSIVE URGENCY WITH  CT HEAD DUE TO SLURRED SPEECH WORSE AT TIMES AND PT TO ENABLE SAFE AMBULATION      Admission: Date/Time/Statement:   Admission Orders (From admission, onward)     Ordered        09/22/20 1403  Inpatient Admission  Once         09/21/20 1341  Place in Observation  Once                   Orders Placed This Encounter   Procedures    Inpatient Admission     Standing Status:   Standing     Number of Occurrences:   1     Order Specific Question:   Admitting Physician     Answer:   Viktoria Amaya     Order Specific Question:   Level of Care     Answer:   Med Surg [16]     Order Specific Question:   Estimated length of stay     Answer:   More than 2 Midnights     Order Specific Question:   Certification     Answer:   I certify that inpatient services are medically necessary for this patient for a duration of greater than two midnights  See H&P and MD Progress Notes for additional information about the patient's course of treatment  ED Arrival Information     Expected Arrival Acuity Means of Arrival Escorted By Service Admission Type    - 9/21/2020 07:47 Urgent Ambulance [facility] Hospitalist Urgent    Arrival Complaint    back pain        Chief Complaint   Patient presents with    Back Pain     patient presents by ems for back pain fell today  has hx of falling due to muscle spasms      Assessment/Plan:  73 yo female with hx of dm, arthritis,htn, muscle weakness r sided, seizure, and unsteady gait presents to ED via ambulance with severe back pain x 1 wk that worsened today and required pt to lower self to ground and crawl  Pain was initially R sided then L lower back into the buttock  XR lumber spine unremarkable and CT ruled out dissection   Pt given Lido patch, po analgesics and IV Apresoline for elevated BP,  and pt st cathed for bladder scan of 390 with difficulty voiding on bedpan  Pt admitted as Observation with dx of acute back pain with ambulation dysfunction and hypertensive urgency  PLan- PT/OT, transdermal and po analgesics, heating pad and continue home meds with IV beta blocker for BP    9/22Pt continues with hypertension- given home po meds early in am rather than waiting for scheduled time as pt didn't take home bp meds 9/21  BP improving with morning vitals  PT working with pt who continues to have issues with safe ambulation due to leg buckling and will likely need rehab,  XR  9/21 shows worsening L5/L6 spondylolisthesis  No further voiding issues and current pain regime effective  Family states slurred speech worse at times, plan forCT -head    ED Triage Vitals [09/21/20 0751]   Temperature Pulse Respirations Blood Pressure SpO2   98 2 °F (36 8 °C) 68 18 (!) 218/98 100 %      Temp Source Heart Rate Source Patient Position - Orthostatic VS BP Location FiO2 (%)   Oral Monitor Lying Right arm --      Pain Score       5          Wt Readings from Last 1 Encounters:   09/21/20 74 7 kg (164 lb 10 9 oz)     Additional Vital Signs:   Date/Time   Temp   Pulse   Resp   BP   MAP (mmHg)   SpO2     09/22/20 0700   97 7 °F (36 5 °C)   66   17   165/79   --   90 %     09/22/20 0314   --   --   --   160/80   --   --     09/22/20 0119   --   --   --   192/72Abnormal     --   --     09/22/20 0054   --   72   --   --   --   --     09/22/20 0040   --   --   --   210/90Abnormal     --   --     09/21/20 2244   --   --   --   184/90Abnormal     --   --     09/21/20 2112   98 6 °F (37 °C)   70   16   190/78Abnormal     --   93 %     09/21/20 1921   --   --   --   182/74Abnormal     --   --     09/21/20 1834   --   --   --   200/84Abnormal     --   --     09/21/20 1720   98 °F (36 7 °C)   69   16   210/90Abnormal     --   92 %     09/21/20 1613   --   71   16   211/86Abnormal --   98 %     09/21/20 1049   --   --   --   214/84Abnormal     --   --     09/21/20 1015   --   58   18   218/94Abnormal     135   95 %     09/21/20 1003   --   64   16   218/94Abnormal     --   99 %     09/21/20 0825   --   --   --   --   --   --     09/21/20 0800   --   66   18   197/160Abnormal     173   100 %         Pertinent Labs/Diagnostic Test Results:   9/21 XR spine lumbar  Mild degenerative changes with increasing grade 1 spondylolisthesis of L5 on L6   CTA chest abdomen pelvis -dissection   No acute pathology    No aortic aneurysm or dissection    Postsurgical changes in the retroperitoneum again seen, with a stable small left-sided nodule since 2016    Cholelithiasis          Results from last 7 days   Lab Units 09/21/20  0823   WBC Thousand/uL 6 91   HEMOGLOBIN g/dL 13 9   HEMATOCRIT % 41 5   PLATELETS Thousands/uL 268   NEUTROS ABS Thousands/µL 4 51         Results from last 7 days   Lab Units 09/22/20  0445 09/21/20  0823   SODIUM mmol/L 138 141   POTASSIUM mmol/L 3 5 3 5   CHLORIDE mmol/L 104 104   CO2 mmol/L 25 24   ANION GAP mmol/L 9 13   BUN mg/dL 22 18   CREATININE mg/dL 0 97 0 83   EGFR ml/min/1 73sq m 60 72   CALCIUM mg/dL 8 7 9 0     Results from last 7 days   Lab Units 09/21/20  0823   AST U/L 22   ALT U/L 28   ALK PHOS U/L 59   TOTAL PROTEIN g/dL 7 0   ALBUMIN g/dL 3 4*   TOTAL BILIRUBIN mg/dL 0 90     Results from last 7 days   Lab Units 09/22/20  1117 09/22/20  0742 09/22/20  0444 09/21/20 2057 09/21/20  1806   POC GLUCOSE mg/dl 265* 299* 346* 328* 220*     Results from last 7 days   Lab Units 09/22/20  0445 09/21/20  0823   GLUCOSE RANDOM mg/dL 347* 206*       Results from last 7 days   Lab Units 09/21/20  1559   CLARITY UA  Clear   COLOR UA  Yellow   SPEC GRAV UA  1 020   PH UA  5 5   GLUCOSE UA mg/dl 100 (1/10%)*   KETONES UA mg/dl Trace*   BLOOD UA  Moderate*   PROTEIN UA mg/dl >=300*   NITRITE UA  Negative   BILIRUBIN UA  Negative   UROBILINOGEN UA E U /dl 0 2   LEUKOCYTES UA Negative   WBC UA /hpf 2-4   RBC UA /hpf 0-1*   BACTERIA UA /hpf None Seen   EPITHELIAL CELLS WET PREP /hpf Occasional         ED Treatment:   Medication Administration from 09/21/2020 0746 to 09/21/2020 1657       Date/Time Order Dose Route Action     09/21/2020 0811 lidocaine (LIDODERM) 5 % patch 1 patch 1 patch Topical Medication Applied     09/21/2020 0810 acetaminophen (TYLENOL) tablet 975 mg 975 mg Oral Given     09/21/2020 1029 morphine (MSIR) IR tablet 15 mg 15 mg Oral Given     09/21/2020 1049 hydrALAZINE (APRESOLINE) injection 5 mg 5 mg Intravenous Given     09/21/2020 1105 iohexol (OMNIPAQUE) 350 MG/ML injection (SINGLE-DOSE) 100 mL 100 mL Intravenous Given        Past Medical History:   Diagnosis Date    Aneurysm of basilar artery ([facility] ) 07/2015    Arthritis     Cardiac arrest ([facility] ) 2014    Complex partial seizure with impairment of consciousness at onset [facility]) 07/2017    Dependent on wheelchair     "at times"    Diabetes mellitus ([facility] )     IDDM    Disease of thyroid gland     Hyperlipidemia     Hypertension     Left hip pain     Left knee pain     Muscle weakness     right sided weakness    Risk for falls     Seizures (HCC)     "pt reports none recent"    Sleep apnea     "doesn't use machine"    Stenosis of middle cerebral artery 07/2015    Stroke (HCC)     B BG, R frontal, L parietal, L pontine    Unsteady gait     Uses walker          Admitting Diagnosis: Back pain [M54 9]  Ambulatory dysfunction [R26 2]  Chronic bilateral low back pain without sciatica [M54 5, G89 29]  Age/Sex: 72 y o  female  Admission Orders:  Scheduled Medications:  acetaminophen, 975 mg, Oral, Q8H [address]  amLODIPine, 10 mg, Oral, Daily  atorvastatin, 40 mg, Oral, Daily  cloNIDine, 0 3 mg, Oral, Daily  clopidogrel, 75 mg, Oral, Daily  enoxaparin, 40 mg, Subcutaneous, Daily  fenofibrate, 145 mg, Oral, Daily  FLUoxetine, 20 mg, Oral, HS  hydrochlorothiazide, 25 mg, Oral, Daily  insulin glargine, 12 Units, Subcutaneous, HS  insulin lispro, 1-5 Units, Subcutaneous, HS  insulin lispro, 2-12 Units, Subcutaneous, TID AC  insulin lispro, 6 Units, Subcutaneous, TID AC  levETIRAcetam, 500 mg, Oral, BID  levothyroxine, 75 mcg, Oral, Early Morning  lidocaine, 1 patch, Topical, Daily  lisinopril, 30 mg, Oral, Daily  menthol-methyl salicylate, , Apply externally, TID  methocarbamol, 500 mg, Oral, Q6H [facility]         PRN Meds:  hydrALAZINE, 5 mg, Intravenous, Q8H PRN x1 9/21  ketorolac, 15 mg, Intravenous, Q6H PRN x1 9/21  Labetalol HCl, 10 mg, Intravenous, Q6H PRN x1 9/21 , x1 9/22  ondansetron, 4 mg, Intravenous, Q4H PRN  oxyCODONE, 5 mg, Oral, Q4H PRN  hydrALAZINE (APRESOLINE) injection 10 mg    Dose: 10 mg  Freq: Once as needed Route: IV  PRN Reason: high blood pressure  PRN Comment: SBP > 180 x1 9/21    poct glucose   Aqua k for back  TWAN/CHO controled  Level 2    Network Utilization Review Department  [email or web address]  ATTENTION: Please call with any questions or concerns to [phone] and carefully listen to the prompts so that you are directed to the right person  All voicemails are confidential   Kathi Dash all requests for admission clinical reviews, approved or denied determinations and any other requests to dedicated fax number below belonging to the campus where the patient is receiving treatment   List of dedicated fax numbers for the Facilities:  FACILITY NAME UR FAX NUMBER   ADMISSION DENIALS (Administrative/Medical Necessity) [phone]   [address] (Maternity/NICU/Pediatrics) [phone]   Parvin Nunez [phone]   Chandan Clayton [phone]   [place] [place] [phone]   [facility] [address] [facility] [address] [facility] [phone] [address] [place] [address],  [phone]

## 2020-09-22 NOTE — ASSESSMENT & PLAN NOTE
· Continue Plavix  · Patient's  reported to me today that she has had slurred speech ever since her stroke a long time ago but that sometimes it sounds worse than other times and recently it seems to be more noticeable    Will do routine head CT as a does not appear that this is acutely changed

## 2020-09-22 NOTE — ASSESSMENT & PLAN NOTE
Lab Results   Component Value Date    HGBA1C 7 9 (H) 09/22/2020       Recent Labs     09/21/20 2057 09/22/20  0444 09/22/20  0742 09/22/20  1117   POCGLU 328* 346* 299* 265*       Blood Sugar Average: Last 72 hrs:  (P) 291 6 A1c shows inadequate control  · Blood sugars noted to be uncontrolled  Patient is not receiving any steroids  · Increase Lantus and lispro dosing    Continue sliding scale coverage  · Continue diabetic diet

## 2020-09-22 NOTE — PLAN OF CARE
Problem: Potential for Falls  Goal: Patient will remain free of falls  Description: INTERVENTIONS:  - Assess patient frequently for physical needs  -  Identify cognitive and physical deficits and behaviors that affect risk of falls  -  [place] fall precautions as indicated by assessment   - Educate patient/family on patient safety including physical limitations  - Instruct patient to call for assistance with activity based on assessment  - Modify environment to reduce risk of injury  - Consider OT/PT consult to assist with strengthening/mobility  Outcome: Progressing     Problem: PAIN - ADULT  Goal: Verbalizes/displays adequate comfort level or baseline comfort level  Description: Interventions:  - Encourage patient to monitor pain and request assistance  - Assess pain using appropriate pain scale  - Administer analgesics based on type and severity of pain and evaluate response  - Implement non-pharmacological measures as appropriate and evaluate response  - Consider cultural and social influences on pain and pain management  - Notify physician/advanced practitioner if interventions unsuccessful or patient reports new pain  Outcome: Progressing     Problem: SAFETY ADULT  Goal: Patient will remain free of falls  Description: INTERVENTIONS:  - Assess patient frequently for physical needs  -  Identify cognitive and physical deficits and behaviors that affect risk of falls    -  [place] fall precautions as indicated by assessment   - Educate patient/family on patient safety including physical limitations  - Instruct patient to call for assistance with activity based on assessment  - Modify environment to reduce risk of injury  - Consider OT/PT consult to assist with strengthening/mobility  Outcome: Progressing  Goal: Maintain or return to baseline ADL function  Description: INTERVENTIONS:  -  Assess patient's ability to carry out ADLs; assess patient's baseline for ADL function and identify physical deficits which impact ability to perform ADLs (bathing, care of mouth/teeth, toileting, grooming, dressing, etc )  - Assess/evaluate cause of self-care deficits   - Assess range of motion  - Assess patient's mobility; develop plan if impaired  - Assess patient's need for assistive devices and provide as appropriate  - Encourage maximum independence but intervene and supervise when necessary  - Involve family in performance of ADLs  - Assess for home care needs following discharge   - Consider OT consult to assist with ADL evaluation and planning for discharge  - Provide patient education as appropriate  Outcome: Progressing  Goal: Maintain or return mobility status to optimal level  Description: INTERVENTIONS:  - Assess patient's baseline mobility status (ambulation, transfers, stairs, etc )    - Identify cognitive and physical deficits and behaviors that affect mobility  - Identify mobility aids required to assist with transfers and/or ambulation (gait belt, sit-to-stand, lift, walker, cane, etc )  - [place] fall precautions as indicated by assessment  - Record patient progress and toleration of activity level on Mobility SBAR; progress patient to next Phase/Stage  - Instruct patient to call for assistance with activity based on assessment  - Consider rehabilitation consult to assist with strengthening/weightbearing, etc   Outcome: Progressing     Problem: DISCHARGE PLANNING  Goal: Discharge to home or other facility with appropriate resources  Description: INTERVENTIONS:  - Identify barriers to discharge w/patient and caregiver  - Arrange for needed discharge resources and transportation as appropriate  - Identify discharge learning needs (meds, wound care, etc )  - Arrange for interpretive services to assist at discharge as needed  - Refer to Case Management Department for coordinating discharge planning if the patient needs post-hospital services based on physician/advanced practitioner order or complex needs related to functional status, cognitive ability, or social support system  Outcome: Progressing     Problem: Knowledge Deficit  Goal: Patient/family/caregiver demonstrates understanding of disease process, treatment plan, medications, and discharge instructions  Description: Complete learning assessment and assess knowledge base    Interventions:  - Provide teaching at level of understanding  - Provide teaching via preferred learning methods  Outcome: Progressing

## 2020-09-22 NOTE — ASSESSMENT & PLAN NOTE
· Patient presented with 1 week of low back pain which appears to be fairly focal and non-radiating, also associated with inability to ambulate due to pain  No clear inciting event  · X-ray shows some lumbar degenerative disease with worsening L5/L6 spondylolisthesis, without acute fracture  CT scan done in the context of CTA to rule out dissection confirmed same  · In the absence of any radiculopathic findings we can likely hold off on ordering MRI of the lumbar sacral spine temporarily and orthopedic consult, but should she fail conservative measures this may be needed  · Consulted physical therapy--appreciate their input  Patient is continuing to have issues with leg buckling  · Pain regimen as follows:  Around the clock Tylenol 975 t i d , topical menthol and lidocaine daily, Robaxin 500 mg q i d  Around the clock, Toradol 15 mg IV q 6 hours p r n  Moderate breakthrough pain, oxycodone 5 mg for severe pain  Also utilize stretching and heating pad    Patient reports current pain regimen to be affective and she has not even required narcotics

## 2020-09-22 NOTE — PLAN OF CARE
Problem: PHYSICAL THERAPY ADULT  Goal: Performs mobility at highest level of function for planned discharge setting  See evaluation for individualized goals  Description: Treatment/Interventions: Functional transfer training, LE strengthening/ROM, Elevations, Therapeutic exercise, Endurance training, Patient/family training, Equipment eval/education, Bed mobility, Gait training, Spoke to nursing, Spoke to case management, Spoke to advanced practitioner  Equipment Recommended: Rajwinder Che       See flowsheet documentation for full assessment, interventions and recommendations  Note: Prognosis: Fair  Problem List: Decreased strength, Decreased range of motion, Decreased endurance, Impaired balance, Decreased mobility, Impaired judgement, Decreased safety awareness, Obesity, Orthopedic restrictions, Pain  Assessment: Pt is 72 y o  female seen for PT evaluation s/p admit to [address] [place] [address] on 9/21/2020  Pt presenting w/ w/ acute onset LBP x 1 week w/ having to sit and crawl on floor at home and inability to ambulate   X-ray shows some lumbar degenerative disease with worsening L5 spondylolisthesis, without acute fracture  CT scan done in the context of CTA to rule out dissection confirmed same  BP was significantly elevated in /98  Current dx/ problem list includes: HTN urgency; acute LBP; ambulatory dysfunction; urinary retention  Comorbidities affecting pt's physical performance at time of assessment listed above and significant for: hx of CVA w/ reported dysarthria at baseline; Ramona Moore Personal factors affecting pt at time of IE include: steps to enter environment, limited home support, advanced age, past experience, inability to perform IADLs, inability to perform ADLs, inability to ambulate household distances, limited insight into impairments and recent fall(s)    Due to acute medical issues, ongoing medical workup for primary dx; pain, fall risk, increased reliance on more restrictive AD compared to baseline;  decreased activity tolerance compared to baseline, increased assistance needed from caregiver at current time, continuous monitoring, acute pain; multiple lines, decline in overall functional mobility status; health management issues; note unstable clinical picture (high complexity)   Prior to admission, pt was living w/ spouse atul Leal w/ LISSET and FF to second floor- pt reports hx of CVA w/ "speech issues" reports I w/ ADL's IADL"s including driving; indep w/ mobility home and community w/o AD- has RW and SPC- recent did use of occasion for report of 4 falls recently- pt reports "no injury"  W/ falls- but cannot provide many details around fall events  Speech during and throughout assessment was slurred but strength was 5/5 equal and symmetrical ; sensation intact  Pt did not complain of dizziness and vitals stable w/ /88  Currently pt  is requiring Pranay A for bed skills; modA for functional transfers and modA for ambulation w/ RW w/ R kene buckling on multiple attempts  (+) pain in low R sided low back but w/o radiation to LE's  Pt reports improvement in pain up in recliner post session  Pt presents functioning below baseline and currently w/ overall mobility deficits 2* to: acute LBP; llimited flexibility;  generalized weakness/ deconditioning; decreased endurance; decreased activity tolerance; decreased coordination; impaired balance; gait   Barriers to Discharge: Inaccessible home environment, Decreased caregiver support  Barriers to Discharge Comments: stairs/ pain/ limited functional mobility and high fall risk w/ knee buckling   PT Discharge Recommendation: Post-Acute Rehabilitation Services     PT - OK to Discharge: Yes(only to rehab )    See flowsheet documentation for full assessment

## 2020-09-22 NOTE — PLAN OF CARE
Problem: Potential for Falls  Goal: Patient will remain free of falls  Description: INTERVENTIONS:  - Assess patient frequently for physical needs  -  Identify cognitive and physical deficits and behaviors that affect risk of falls  -  [place] fall precautions as indicated by assessment   - Educate patient/family on patient safety including physical limitations  - Instruct patient to call for assistance with activity based on assessment  - Modify environment to reduce risk of injury  - Consider OT/PT consult to assist with strengthening/mobility  Outcome: Progressing     Problem: PAIN - ADULT  Goal: Verbalizes/displays adequate comfort level or baseline comfort level  Description: Interventions:  - Encourage patient to monitor pain and request assistance  - Assess pain using appropriate pain scale  - Administer analgesics based on type and severity of pain and evaluate response  - Implement non-pharmacological measures as appropriate and evaluate response  - Consider cultural and social influences on pain and pain management  - Notify physician/advanced practitioner if interventions unsuccessful or patient reports new pain  Outcome: Progressing     Problem: SAFETY ADULT  Goal: Patient will remain free of falls  Description: INTERVENTIONS:  - Assess patient frequently for physical needs  -  Identify cognitive and physical deficits and behaviors that affect risk of falls    -  [place] fall precautions as indicated by assessment   - Educate patient/family on patient safety including physical limitations  - Instruct patient to call for assistance with activity based on assessment  - Modify environment to reduce risk of injury  - Consider OT/PT consult to assist with strengthening/mobility  Outcome: Progressing  Goal: Maintain or return to baseline ADL function  Description: INTERVENTIONS:  -  Assess patient's ability to carry out ADLs; assess patient's baseline for ADL function and identify physical deficits which impact ability to perform ADLs (bathing, care of mouth/teeth, toileting, grooming, dressing, etc )  - Assess/evaluate cause of self-care deficits   - Assess range of motion  - Assess patient's mobility; develop plan if impaired  - Assess patient's need for assistive devices and provide as appropriate  - Encourage maximum independence but intervene and supervise when necessary  - Involve family in performance of ADLs  - Assess for home care needs following discharge   - Consider OT consult to assist with ADL evaluation and planning for discharge  - Provide patient education as appropriate  Outcome: Progressing  Goal: Maintain or return mobility status to optimal level  Description: INTERVENTIONS:  - Assess patient's baseline mobility status (ambulation, transfers, stairs, etc )    - Identify cognitive and physical deficits and behaviors that affect mobility  - Identify mobility aids required to assist with transfers and/or ambulation (gait belt, sit-to-stand, lift, walker, cane, etc )  - [place] fall precautions as indicated by assessment  - Record patient progress and toleration of activity level on Mobility SBAR; progress patient to next Phase/Stage  - Instruct patient to call for assistance with activity based on assessment  - Consider rehabilitation consult to assist with strengthening/weightbearing, etc   Outcome: Progressing     Problem: DISCHARGE PLANNING  Goal: Discharge to home or other facility with appropriate resources  Description: INTERVENTIONS:  - Identify barriers to discharge w/patient and caregiver  - Arrange for needed discharge resources and transportation as appropriate  - Identify discharge learning needs (meds, wound care, etc )  - Arrange for interpretive services to assist at discharge as needed  - Refer to Case Management Department for coordinating discharge planning if the patient needs post-hospital services based on physician/advanced practitioner order or complex needs related to functional status, cognitive ability, or social support system  Outcome: Progressing     Problem: Knowledge Deficit  Goal: Patient/family/caregiver demonstrates understanding of disease process, treatment plan, medications, and discharge instructions  Description: Complete learning assessment and assess knowledge base    Interventions:  - Provide teaching at level of understanding  - Provide teaching via preferred learning methods  Outcome: Progressing

## 2020-09-22 NOTE — PHYSICAL THERAPY NOTE
Physical Therapy Eval    Patient Name: Wilian Dominique    TQHUC'B Date: 9/22/2020     Problem List  Principal Problem:    Acute low back pain  Active Problems:    History of stroke    Urinary retention    Hypertensive urgency    DM2 (diabetes mellitus, type 2) ([place] [address] )       Past Medical History  Past Medical History:   Diagnosis Date    Aneurysm of basilar artery ([facility] ) 07/2015    Arthritis     Cardiac arrest ([place] [address] ) 2014    Complex partial seizure with impairment of consciousness at onset [facility]) 07/2017    Dependent on wheelchair     "at times"    Diabetes mellitus ([place] [address] )     IDDM    Disease of thyroid gland     Hyperlipidemia     Hypertension     Left hip pain     Left knee pain     Muscle weakness     right sided weakness    Risk for falls     Seizures ([place] [address] )     "pt reports none recent"    Sleep apnea     "doesn't use machine"    Stenosis of middle cerebral artery 07/2015    Stroke ([place] [address] )     B BG, R frontal, L parietal, L pontine    Unsteady gait     Uses walker         Past Surgical History  Past Surgical History:   Procedure Laterality Date    ABDOMINAL SURGERY      benign mass per pt    DILATION AND CURETTAGE OF UTERUS      HYSTERECTOMY      [place] TOTAL HIP ARTHROPLASTY Left 4/27/2018    Procedure: ARTHROPLASTY HIP TOTAL;  Surgeon: Renate Hutson MD;  Location: [facility];  Service: Orthopedics    TONSILLECTOMY      WISDOM TOOTH EXTRACTION           09/22/20 1310   PT Last Visit   PT Visit Date 09/22/20   Note Type   Note type Eval/Treat   Pain Assessment   Pain Assessment Tool 0-10   Pain Score 5   Pain Location/Orientation Orientation: Mid;Location: Back   Pain Radiating Towards buttocks    Pain Onset/Description Onset: Ongoing; Descriptor: Aching;Descriptor: Stabbing   Effect of Pain on Daily Activities limited mobility w/ R knee buckling    Patient's Stated Pain Goal No pain   Home Living   Type of Home House   Home Layout Two level  (3 LISSET then 16 to bed and bath )   Home Equipment Walker;Cane   Additional Comments pt reports living w/ spouse in a [facility] w/ approx 3 LISSET 16 to main bed and bath on 2nd floor- pt reports 4 falls in 6 months- reports "holding onto things for support at home prior" (+) ; reports normally indep w/ ADL's and IADL"s w/ recent difficulty    Prior Function   Level of [place] Independent with ADLs and functional mobility   Lives With Spouse   Receives Help From Family   ADL Assistance Independent   IADLs Independent   Falls in the last 6 months 1 to 4  (4 reported- reports no injury )   Vocational Retired   Comments pt reports normally indep and active not using AD (+) - see above    Restrictions/Precautions   Weight Bearing Precautions Per Order No   Braces or Orthoses   (none )   Other Precautions Pain; Fall Risk;Multiple lines; Bed Alarm; Chair Alarm; Impulsive   General   Additional Pertinent History hx of CVA w/ dysarthria - dysarthria noted and discussed w/ LEE Sutherland   Family/Caregiver Present No   Cognition   Overall Cognitive Status WFL   Orientation Level Oriented X4   Memory Decreased recall of precautions   Following Commands Follows one step commands without difficulty   Comments pt is impulaive and requires cues for safety and to slow down throughout   RUE Assessment   RUE Assessment WFL   LUE Assessment   LUE Assessment WFL   RLE Assessment   RLE Assessment WFL  (5/5 throughout )   LLE Assessment   LLE Assessment WFL  (5/5 throughout )   Coordination   Movements are Fluid and Coordinated 0   Coordination and Movement Description ataxic R LE w/ knee buckling on ambulation -    Sensation WFL  (grossly intact and pt denies N/T or radicular symptoms )   Light Touch   RLE Light Touch Grossly intact   LLE Light Touch Grossly intact   Proprioception   RLE Proprioception Grossly intact   LLE Proprioception Grossly Intact   Bed Mobility   Rolling R 5  Supervision  (impulsive w/ cues for log roll- pain ,limiting )   Rolling L 5  Supervision   Additional items Impulsive;Verbal cues   Supine to Sit 4  Minimal assistance  (pain limiting- impulsive )   Additional Comments OOB in reclier on completion of eval and tx session- chair alarm intact and all needs in reach    Transfers   Sit to Stand 4  Minimal assistance   Additional items Impulsive;Verbal cues   Stand to Sit 4  Minimal assistance   Additional items Impulsive;Verbal cues   Stand pivot 4  Minimal assistance   Additional items Assist x 1;Trapeze bar;Impulsive  (RW and via SPV )   Ambulation/Elevation   Gait pattern R Knee Anastasiya; Improper Weight shift; Antalgic;Narrow GAGAN; Forward Flexion;Decreased foot clearance; Step to;Short stride   Gait Assistance 3  Moderate assist  (min then MOD A for R knee buckling/ safety adn prevent fall )   Assistive Device Rolling walker   Distance 7' w/ RW and modA- antalgic gait pattern w/ R knee buckling requiring modA for correction to prevent fall- pt was able to then self correct w/ assist from PT and ambualte 3' to recliner again w/ mild R knee buckling- pt reports increased comfort in standing position- pain in mid to R low back w/o radiation throughout  vitals stable -further distnaces deferred 2* pain and LE instability (+) R knee buckling    Balance   Static Sitting Fair +   Dynamic Sitting Fair   Static Standing Poor +   Ambulatory Poor -   Endurance Deficit   Endurance Deficit Yes   Activity Tolerance   Activity Tolerance Patient limited by fatigue;Patient limited by pain   Medical Staff Made Aware yes- S/W Araseli Mccullough / [address] yes- updated- cleared for OOB to chair-    Assessment   Prognosis Fair   Problem List Decreased strength;Decreased range of motion;Decreased endurance; Impaired balance;Decreased mobility; Impaired judgement;Decreased safety awareness; Obesity;Orthopedic restrictions;Pain   Assessment Pt is 72 y o  female seen for PT evaluation s/p admit to [address] [place] [address] on 9/21/2020  Pt presenting w/ w/ acute onset LBP x 1 week w/ having to sit and crawl on floor at home and inability to ambulate   X-ray shows some lumbar degenerative disease with worsening L5 spondylolisthesis, without acute fracture  CT scan done in the context of CTA to rule out dissection confirmed same  BP was significantly elevated in /98  Current dx/ problem list includes: HTN urgency; acute LBP; ambulatory dysfunction; urinary retention  Comorbidities affecting pt's physical performance at time of assessment listed above and significant for: hx of CVA w/ reported dysarthria at baseline; Ehsan Manifold Personal factors affecting pt at time of IE include: steps to enter environment, limited home support, advanced age, past experience, inability to perform IADLs, inability to perform ADLs, inability to ambulate household distances, limited insight into impairments and recent fall(s)  Due to acute medical issues, ongoing medical workup for primary dx; pain, fall risk, increased reliance on more restrictive AD compared to baseline;  decreased activity tolerance compared to baseline, increased assistance needed from caregiver at current time, continuous monitoring, acute pain; multiple lines, decline in overall functional mobility status; health management issues; note unstable clinical picture (high complexity)   Prior to admission, pt was living w/ spouse atul  [facility] w/ LISSET and FF to second floor- pt reports hx of CVA w/ "speech issues" reports I w/ ADL's IADL"s including driving; indep w/ mobility home and community w/o AD- has RW and SPC- recent did use of occasion for report of 4 falls recently- pt reports "no injury"  W/ falls- but cannot provide many details around fall events  Speech during and throughout assessment was slurred but strength was 5/5 equal and symmetrical ; sensation intact   Pt did not complain of dizziness and vitals stable w/ /88  Currently pt  is requiring Pranay A for bed skills; modA for functional transfers and modA for ambulation w/ RW w/ R kene buckling on multiple attempts  (+) pain in low R sided low back but w/o radiation to LE's  Pt reports improvement in pain up in recliner post session  Pt presents functioning below baseline and currently w/ overall mobility deficits 2* to: acute LBP; llimited flexibility;  generalized weakness/ deconditioning; decreased endurance; decreased activity tolerance; decreased coordination; impaired balance; gait    Barriers to Discharge Inaccessible home environment;Decreased caregiver support   Barriers to Discharge Comments stairs/ pain/ limited functional mobility and high fall risk w/ knee buckling    Goals   Patient Goals less pain- not fall; be able to go home    STG Expiration Date 10/02/20   Short Term Goal #1 In 10 days pt will complete: 1) Bed mobility skills with MI to facilitate safe return to previous living environment 2) Functional transfers with MI  to facilitate safe return to previous living environment  3) Ambulation with least restrictive AD MI > 250' without LOB and stable vitals for safe ambulation home/ community distances  4) Stair training up/ down 16 step/s with BL rail/s  and CGA for safe access to previous living environment  5) Improve balance grades to Good 6) Improve pain to <4 / 10 w/ mobility   7) stretching program positioning for low back  independently  8) PT for ongoing pt and family education; DME needs and D/C planning to promote highest level of function in least restrictive environment  PT Treatment Day 0   Plan   Treatment/Interventions Functional transfer training;LE strengthening/ROM; Elevations; Therapeutic exercise; Endurance training;Patient/family training;Equipment eval/education; Bed mobility;Gait training;Spoke to nursing;Spoke to case management;Spoke to advanced practitioner   PT Frequency   (3-5x/wk )   Recommendation   PT Discharge Recommendation Post-Acute Rehabilitation Services   Equipment Recommended Obie Castleman   PT - OK to Discharge Yes  (only to rehab )   Modified [place] Scale   Modified [place] Scale 4   Barthel Index   Feeding 10   Bathing 0   Grooming Score 5   Dressing Score 5   Bladder Score 10   Bowels Score 10   Toilet Use Score 5   Transfers (Bed/Chair) Score 5   Mobility (Level Surface) Score 0   Stairs Score 0   Barthel Index Score 50      09/22/20 1310   PT Last Visit   PT Visit Date 09/22/20   Note Type   Note type Eval/Treat   Pain Assessment   Pain Assessment Tool 0-10   Pain Score 5   Pain Location/Orientation Orientation: Mid;Location: Back   Pain Radiating Towards buttocks    Pain Onset/Description Onset: Ongoing; Descriptor: Aching;Descriptor: Stabbing   Effect of Pain on Daily Activities limited mobility w/ R knee buckling    Patient's Stated Pain Goal No pain   Home Living   Type of Home House   Home Layout Two level  (3 LISSET then 16 to bed and bath )   Home Equipment Walker;Cane   Additional Comments pt reports living w/ spouse in a [facility] w/ approx 3 LISSET 16 to main bed and bath on 2nd floor- pt reports 4 falls in 6 months- reports "holding onto things for support at home prior" (+) ; reports normally indep w/ ADL's and IADL"s w/ recent difficulty    Prior Function   Level of [place] Independent with ADLs and functional mobility   Lives With Spouse   Receives Help From Family   ADL Assistance Independent   IADLs Independent   Falls in the last 6 months 1 to 4  (4 reported- reports no injury )   Vocational Retired   Comments pt reports normally indep and active not using AD (+) - see above    Restrictions/Precautions   Weight Bearing Precautions Per Order No   Braces or Orthoses   (none )   Other Precautions Pain; Fall Risk;Multiple lines; Bed Alarm; Chair Alarm; Impulsive   General   Additional Pertinent History hx of CVA w/ dysarthria - dysarthria noted and discussed w/ LEE Sutherland   Family/Caregiver Present No   Cognition   Overall Cognitive Status WFL   Orientation Level Oriented X4   Memory Decreased recall of precautions   Following Commands Follows one step commands without difficulty   Comments pt is impulaive and requires cues for safety and to slow down throughout   RUE Assessment   RUE Assessment WFL   LUE Assessment   LUE Assessment WFL   RLE Assessment   RLE Assessment WFL  (5/5 throughout )   LLE Assessment   LLE Assessment WFL  (5/5 throughout )   Coordination   Movements are Fluid and Coordinated 0   Coordination and Movement Description ataxic R LE w/ knee buckling on ambulation -    Sensation WFL  (grossly intact and pt denies N/T or radicular symptoms )   Light Touch   RLE Light Touch Grossly intact   LLE Light Touch Grossly intact   Proprioception   RLE Proprioception Grossly intact   LLE Proprioception Grossly Intact   Bed Mobility   Rolling R 5  Supervision  (impulsive w/ cues for log roll- pain ,limiting )   Rolling L 5  Supervision   Additional items Impulsive;Verbal cues   Supine to Sit 4  Minimal assistance  (pain limiting- impulsive )   Additional Comments OOB in reclier on completion of eval and tx session- chair alarm intact and all needs in reach    Transfers   Sit to Stand 4  Minimal assistance   Additional items Impulsive;Verbal cues   Stand to Sit 4  Minimal assistance   Additional items Impulsive;Verbal cues   Stand pivot 4  Minimal assistance   Additional items Assist x 1;Trapeze bar;Impulsive  (RW and via SPV )   Ambulation/Elevation   Gait pattern R Knee Anastasiya; Improper Weight shift; Antalgic;Narrow GAGAN; Forward Flexion;Decreased foot clearance; Step to;Short stride   Gait Assistance 3  Moderate assist  (min then MOD A for R knee buckling/ safety adn prevent fall )   Assistive Device Rolling walker   Distance 7' w/ RW and modA- antalgic gait pattern w/ R knee buckling requiring modA for correction to prevent fall- pt was able to then self correct w/ assist from PT and ambualte 3' to recliner again w/ mild R knee buckling- pt reports increased comfort in standing position- pain in mid to R low back w/o radiation throughout  vitals stable -further distnaces deferred 2* pain and LE instability (+) R knee buckling    Balance   Static Sitting Fair +   Dynamic Sitting Fair   Static Standing Poor +   Ambulatory Poor -   Endurance Deficit   Endurance Deficit Yes   Activity Tolerance   Activity Tolerance Patient limited by fatigue;Patient limited by pain   Medical Staff Made Aware yes- S/W Beatriz Schrader / [address] yes- updated- cleared for OOB to chair-    Assessment   Prognosis Fair   Problem List Decreased strength;Decreased range of motion;Decreased endurance; Impaired balance;Decreased mobility; Impaired judgement;Decreased safety awareness; Obesity;Orthopedic restrictions;Pain   Assessment Pt is 72 y o  female seen for PT evaluation s/p admit to [address] [place] [address] on 9/21/2020  Pt presenting w/ w/ acute onset LBP x 1 week w/ having to sit and crawl on floor at home and inability to ambulate   X-ray shows some lumbar degenerative disease with worsening L5 spondylolisthesis, without acute fracture  CT scan done in the context of CTA to rule out dissection confirmed same  BP was significantly elevated in /98  Current dx/ problem list includes: HTN urgency; acute LBP; ambulatory dysfunction; urinary retention  Comorbidities affecting pt's physical performance at time of assessment listed above and significant for: hx of CVA w/ reported dysarthria at baseline; Gerhardt Sep Personal factors affecting pt at time of IE include: steps to enter environment, limited home support, advanced age, past experience, inability to perform IADLs, inability to perform ADLs, inability to ambulate household distances, limited insight into impairments and recent fall(s)    Due to acute medical issues, ongoing medical workup for primary dx; pain, fall risk, increased reliance on more restrictive AD compared to baseline;  decreased activity tolerance compared to baseline, increased assistance needed from caregiver at current time, continuous monitoring, acute pain; multiple lines, decline in overall functional mobility status; health management issues; note unstable clinical picture (high complexity)   Prior to admission, pt was living w/ spouse atul  [facility] w/ LISSET and FF to second floor- pt reports hx of CVA w/ "speech issues" reports I w/ ADL's IADL"s including driving; indep w/ mobility home and community w/o AD- has RW and SPC- recent did use of occasion for report of 4 falls recently- pt reports "no injury"  W/ falls- but cannot provide many details around fall events  Speech during and throughout assessment was slurred but strength was 5/5 equal and symmetrical ; sensation intact  Pt did not complain of dizziness and vitals stable w/ /88  Currently pt  is requiring Pranay A for bed skills; modA for functional transfers and modA for ambulation w/ RW w/ R kene buckling on multiple attempts  (+) pain in low R sided low back but w/o radiation to LE's  Pt reports improvement in pain up in recliner post session  Pt presents functioning below baseline and currently w/ overall mobility deficits 2* to: acute LBP; llimited flexibility;  generalized weakness/ deconditioning; decreased endurance; decreased activity tolerance; decreased coordination; impaired balance; gait    Barriers to Discharge Inaccessible home environment;Decreased caregiver support   Barriers to Discharge Comments stairs/ pain/ limited functional mobility and high fall risk w/ knee buckling    Goals   Patient Goals less pain- not fall; be able to go home    STG Expiration Date 10/02/20   Short Term Goal #1 In 10 days pt will complete: 1) Bed mobility skills with MI to facilitate safe return to previous living environment 2) Functional transfers with MI  to facilitate safe return to previous living environment  3) Ambulation with least restrictive AD MI > 250' without LOB and stable vitals for safe ambulation home/ community distances   4) Stair training up/ down 16 step/s with BL rail/s  and CGA for safe access to previous living environment  5) Improve balance grades to Good 6) Improve pain to <4 / 10 w/ mobility   7) stretching program positioning for low back  independently  8) PT for ongoing pt and family education; DME needs and D/C planning to promote highest level of function in least restrictive environment  PT Treatment Day 0   Plan   Treatment/Interventions Functional transfer training;LE strengthening/ROM; Elevations; Therapeutic exercise; Endurance training;Patient/family training;Equipment eval/education; Bed mobility;Gait training;Spoke to nursing;Spoke to case management;Spoke to advanced practitioner   PT Frequency   (3-5x/wk )   Recommendation   PT Discharge Recommendation Post-Acute Rehabilitation Services   Equipment Recommended Thomas Dickson   PT - OK to Discharge Yes  (only to rehab )   Modified [place] Scale   Modified [place] Scale 4   Barthel Index   Feeding 10   Bathing 0   Grooming Score 5   Dressing Score 5   Bladder Score 10   Bowels Score 10   Toilet Use Score 5   Transfers (Bed/Chair) Score 5   Mobility (Level Surface) Score 0   Stairs Score 0   Barthel Index Score 50     Pt is 72 y o  female seen for PT evaluation s/p admit to [address] [place] [address] on 9/21/2020  Pt presenting w/ w/ acute onset LBP x 1 week w/ having to sit and crawl on floor at home and inability to ambulate   X-ray shows some lumbar degenerative disease with worsening L5 spondylolisthesis, without acute fracture  CT scan done in the context of CTA to rule out dissection confirmed same  BP was significantly elevated in /98  Current dx/ problem list includes: HTN urgency; acute LBP; ambulatory dysfunction; urinary retention  Comorbidities affecting pt's physical performance at time of assessment listed above and significant for: hx of CVA w/ reported dysarthria at baseline; Bozena Prows  Personal factors affecting pt at time of IE include: steps to enter environment, limited home support, advanced age, past experience, inability to perform IADLs, inability to perform ADLs, inability to ambulate household distances, limited insight into impairments and recent fall(s)  Due to acute medical issues, ongoing medical workup for primary dx; pain, fall risk, increased reliance on more restrictive AD compared to baseline;  decreased activity tolerance compared to baseline, increased assistance needed from caregiver at current time, continuous monitoring, acute pain; multiple lines, decline in overall functional mobility status; health management issues; note unstable clinical picture (high complexity)   Prior to admission, pt was living w/ spouse atul  [facility] w/ LISSET and FF to second floor- pt reports hx of CVA w/ "speech issues" reports I w/ ADL's IADL"s including driving; indep w/ mobility home and community w/o AD- has RW and SPC- recent did use of occasion for report of 4 falls recently- pt reports "no injury"  W/ falls- but cannot provide many details around fall events  Speech during and throughout assessment was slurred but strength was 5/5 equal and symmetrical ; sensation intact  Pt did not complain of dizziness and vitals stable w/ /88  Currently pt  is requiring Pranay A for bed skills; modA for functional transfers and modA for ambulation w/ RW w/ R kene buckling on multiple attempts  (+) pain in low R sided low back but w/o radiation to LE's  Pt reports improvement in pain up in recliner post session  Pt presents functioning below baseline and currently w/ overall mobility deficits 2* to: acute LBP; llimited flexibility;  generalized weakness/ deconditioning; decreased endurance; decreased activity tolerance; decreased coordination; impaired balance; gait deviations; decreased safety awareness; SOB/SELLERS; fatigue; impaired safety and judgement; limited insight into current deficits; bed/ chair alarms; multiple lines; dysarthria impulsivity   Pt currently at risk for falls  (Please find additional objective findings from PT assessment regarding body systems outlined above ) Pt will continue to benefit from skilled PT interventions to address stated impairments; to maximize functional potential; for ongoing pt/ family training; and DME needs  PT is currently recommending d/c to inpatient rehab setting when medically cleared for safety and to maximize functional potential prior to d/c home  OT consult  PT s/w SLIM SHANNAN Sutherland for updates post PT eval  See below for PT tx session  PHYSICAL THERAPY TREATMENT [id number]  PT initiated tx session following eval consisting of pt education in proper positioning for LBP control- utilized lumbar roll in chair for proper low back/ pelvic positioniong w/ pt stating "much relief"- would not provide pain number; however subjectively reports "better than in bed"  STS from chair w/ Pranay and RW for safety;- PT educated pt in standing lumbar extension w/ hands on low back and modA for balance and safety  Pt was able to complete then became fatigued and required seated rest break  Seated instruction in hamstring stretching w/ sheet- pt tolerated fairly well  Pt left in semi reclined position w/ all needs in reach including chair alarm  TT SLIM to discuss ongoing slurred speech- dysarthria w/o other overt  Acute neuro concerns - possibly baseline? RN also aware   Will follow for goals as stated on IE    Pedro Plasencia PT

## 2020-09-22 NOTE — PROGRESS NOTES
Progress Note - Vijay Jose Angelnirav 1950, 72 y o  female MRN: [id number]    Unit/Bed#: S -01 Encounter: [id number]    Primary Care Provider: Andrea Leonardo DO   Date and time admitted to hospital: 9/21/2020  7:47 AM    * Acute low back pain  Assessment & Plan  · Patient presented with 1 week of low back pain which appears to be fairly focal and non-radiating, also associated with inability to ambulate due to pain  No clear inciting event  · X-ray shows some lumbar degenerative disease with worsening L5/L6 spondylolisthesis, without acute fracture  CT scan done in the context of CTA to rule out dissection confirmed same  · In the absence of any radiculopathic findings we can likely hold off on ordering MRI of the lumbar sacral spine temporarily and orthopedic consult, but should she fail conservative measures this may be needed  · Consulted physical therapy--appreciate their input  Patient is continuing to have issues with leg buckling  · Pain regimen as follows:  Around the clock Tylenol 975 t i d , topical menthol and lidocaine daily, Robaxin 500 mg q i d  Around the clock, Toradol 15 mg IV q 6 hours p r n  Moderate breakthrough pain, oxycodone 5 mg for severe pain  Also utilize stretching and heating pad  Patient reports current pain regimen to be affective and she has not even required narcotics    Hypertensive urgency  Assessment & Plan  · Patient had severely elevated blood pressure on admission of 218/98  This is likely due to her known history of difficult to treat hypertension requiring multiple agents as well as exacerbated by pain  In addition apparently she did not take her home medicine on the day of admission  · Continue home regimen of Norvasc, clonidine, HCTZ, lisinopril   P r n  IV labetalol   · Blood pressure overall seems to be improving today  · Pain control regimen    Urinary retention  Assessment & Plan  · Patient had difficulty passing urine in ER    Noted to have bladder scan with 390 mL  Straight cath x1 done   · Now has been urinating without difficulty and nursing reports no additional events of urinary retention on protocol  · UA revealed 3+ proteinuria, otherwise nitrate/wbc negative    DM2 (diabetes mellitus, type 2) [facility])  Assessment & Plan  Lab Results   Component Value Date    HGBA1C 7 9 (H) 09/22/2020       Recent Labs     09/21/20  2057 09/22/20  0444 09/22/20  0742 09/22/20  1117   POCGLU 328* 346* 299* 265*       Blood Sugar Average: Last 72 hrs:  (P) 291 6 A1c shows inadequate control  · Blood sugars noted to be uncontrolled  Patient is not receiving any steroids  · Increase Lantus and lispro dosing  Continue sliding scale coverage  · Continue diabetic diet    History of stroke  Assessment & Plan  · Continue Plavix  · Patient's  reported to me today that she has had slurred speech ever since her stroke a long time ago but that sometimes it sounds worse than other times and recently it seems to be more noticeable  Will do routine head CT as a does not appear that this is acutely changed    VTE Pharmacologic Prophylaxis:   Pharmacologic: Enoxaparin (Lovenox)  Mechanical VTE Prophylaxis in Place: No    Patient Centered Rounds: I have performed bedside rounds with nursing staff today  Discussions with Specialists or Other Care Team Provider:  Case Management, Physical therapy, utilization review    Education and Discussions with Family / Patient:   over the phone    Time Spent for Care: 30 minutes  More than 50% of total time spent on counseling and coordination of care as described above  Current Length of Stay: 0 day(s)    Current Patient Status: Observation   Certification Statement: The patient, admitted on an observation basis, will now require > 2 midnight hospital stay due to Unsafe ambulation with anticipated need for short-term rehab  Also with ongoing need for blood sugar and blood pressure monitoring    Also will check head CT given intermittent progressively worsening dysarthria    Discharge Plan:  Not stable for discharge at this time  Case management to discuss with family regarding rehab    Code Status: Level 1 - Full Code    Subjective:   Patient is reporting pain relief with Tylenol, Robaxin, and lidocaine patch  She has not required any narcotics  She has not had any additional events of urinary retention  He is eating and drinking well  She offers no other complaints or concerns at this time  Has been reported to me that her speech has been slurred since the time of her stroke but more recently seems to sound worse  Objective:     Vitals:   Temp (24hrs), Av 1 °F (36 7 °C), Min:97 7 °F (36 5 °C), Max:98 6 °F (37 °C)    Temp:  [97 7 °F (36 5 °C)-98 6 °F (37 °C)] 97 7 °F (36 5 °C)  HR:  [66-72] 66  Resp:  [16-17] 17  BP: (160-211)/(72-90) 165/79  SpO2:  [90 %-98 %] 90 %  Body mass index is 34 42 kg/m²  Input and Output Summary (last 24 hours): Intake/Output Summary (Last 24 hours) at 2020 1338  Last data filed at 2020 0601  Gross per 24 hour   Intake --   Output 1245 ml   Net -1245 ml       Physical Exam:     Physical Exam  Vitals signs reviewed  Constitutional:       General: She is not in acute distress  Appearance: Normal appearance  She is not ill-appearing, toxic-appearing or diaphoretic  Comments: She does not currently appear to be in any significant distress   HENT:      Head: Normocephalic and atraumatic  Nose: No congestion or rhinorrhea  Eyes:      General: No scleral icterus  Right eye: No discharge  Left eye: No discharge  Cardiovascular:      Rate and Rhythm: Normal rate and regular rhythm  Heart sounds: No murmur  Pulmonary:      Effort: No respiratory distress  Breath sounds: No stridor  No wheezing, rhonchi or rales  Abdominal:      General: There is no distension  Palpations: Abdomen is soft  Tenderness:  There is no abdominal tenderness  There is no guarding  Musculoskeletal:         General: No swelling, deformity or signs of injury  Right lower leg: No edema  Left lower leg: No edema  Skin:     General: Skin is warm and dry  Coloration: Skin is not jaundiced or pale  Findings: No bruising, erythema, lesion or rash  Comments: No evidence rash on her back   Neurological:      Mental Status: She is alert  Comments: Awake alert interactive  No facial asymmetry noted  Mild dysarthria again appreciated but no different compared to my exam yesterday  She follows simple commands and can provide some history but seems to have some mild cognitive impairment? Psychiatric:      Comments: Very pleasant and cooperative         Additional Data:     Labs:    Results from last 7 days   Lab Units 09/21/20  0823   WBC Thousand/uL 6 91   HEMOGLOBIN g/dL 13 9   HEMATOCRIT % 41 5   PLATELETS Thousands/uL 268   NEUTROS PCT % 65   LYMPHS PCT % 20   MONOS PCT % 11   EOS PCT % 3     Results from last 7 days   Lab Units 09/22/20  0445 09/21/20  0823   SODIUM mmol/L 138 141   POTASSIUM mmol/L 3 5 3 5   CHLORIDE mmol/L 104 104   CO2 mmol/L 25 24   BUN mg/dL 22 18   CREATININE mg/dL 0 97 0 83   ANION GAP mmol/L 9 13   CALCIUM mg/dL 8 7 9 0   ALBUMIN g/dL  --  3 4*   TOTAL BILIRUBIN mg/dL  --  0 90   ALK PHOS U/L  --  59   ALT U/L  --  28   AST U/L  --  22   GLUCOSE RANDOM mg/dL 347* 206*         Results from last 7 days   Lab Units 09/22/20  1117 09/22/20  0742 09/22/20  0444 09/21/20 2057 09/21/20  1806   POC GLUCOSE mg/dl 265* 299* 346* 328* 220*     Results from last 7 days   Lab Units 09/22/20  0445   HEMOGLOBIN A1C % 7 9*           * I Have Reviewed All Lab Data Listed Above  * Additional Pertinent Lab Tests Reviewed:  Jessica 67 Admission Reviewed    Imaging:    Imaging Reports Reviewed Today Include:   Imaging Personally Reviewed by Myself Includes:      Recent Cultures (last 7 days):           Last 24 Hours Medication List:   Current Facility-Administered Medications   Medication Dose Route Frequency Provider Last Rate    acetaminophen  975 mg Oral Q8H [address] Ena Atkins, ESTEFANI      amLODIPine  10 mg Oral Daily Anice Catching, ESTEFANI      atorvastatin  40 mg Oral Daily Ena Stoudt, PA-C      cloNIDine  0 3 mg Oral Daily Anice Catching, PA-C      clopidogrel  75 mg Oral Daily Ena Stoudt, PA-C      enoxaparin  40 mg Subcutaneous Daily Ena Zapataudt, PA-C      fenofibrate  145 mg Oral Daily Ena Stoudt, PA-C      FLUoxetine  20 mg Oral HS Ena Jodiudt, PA-C      hydrALAZINE  5 mg Intravenous Q8H PRN Pritesh Remington, VIDYA      hydrochlorothiazide  25 mg Oral Daily Anice Catching, ESTEFANI      insulin glargine  12 Units Subcutaneous HS Ena Stoudt, PA-C      insulin lispro  1-5 Units Subcutaneous HS Pritesh Remington, HUSSAINNP      insulin lispro  2-12 Units Subcutaneous TID AC Vina Remington, CRNP      insulin lispro  6 Units Subcutaneous TID AC Ena Atkins, PA-C      ketorolac  15 mg Intravenous Q6H PRN Ena Atkins, ESTEFANI      Labetalol HCl  10 mg Intravenous Q6H PRN Enablue Atkins, ESTEFANI      levETIRAcetam  500 mg Oral BID Ena Atkins, PA-FRANKIE      levothyroxine  75 mcg Oral Early Morning Ena Atkins, PA-C      lidocaine  1 patch Topical Daily Ena Atkins, PA-C      lisinopril  30 mg Oral Daily Anice Catching, ESTEFANI      menthol-methyl salicylate   Apply externally TID Lynda Rankin, ESTEFANI      methocarbamol  500 mg Oral Q6H [address] Ena Atkins, ESTEFANI      ondansetron  4 mg Intravenous Q4H PRN Ena Atkins, ESTEFANI      oxyCODONE  5 mg Oral Q4H PRN Lynda Rankin PA-C          Today, Patient Was Seen By: Lynda Rankin PA-C    ** Please Note: Dictation voice to text software may have been used in the creation of this document   **

## 2020-09-22 NOTE — ASSESSMENT & PLAN NOTE
· Patient had severely elevated blood pressure on admission of 218/98  This is likely due to her known history of difficult to treat hypertension requiring multiple agents as well as exacerbated by pain  In addition apparently she did not take her home medicine on the day of admission    · Continue home regimen of Norvasc, clonidine, HCTZ, lisinopril   P r n  IV labetalol   · Blood pressure overall seems to be improving today  · Pain control regimen

## 2020-09-23 VITALS
BODY MASS INDEX: 34.57 KG/M2 | DIASTOLIC BLOOD PRESSURE: 58 MMHG | OXYGEN SATURATION: 92 % | HEART RATE: 66 BPM | RESPIRATION RATE: 17 BRPM | WEIGHT: 164.68 LBS | TEMPERATURE: 98.2 F | SYSTOLIC BLOOD PRESSURE: 124 MMHG | HEIGHT: 58 IN

## 2020-09-23 LAB
GLUCOSE SERPL-MCNC: 158 MG/DL (ref 65–140)
GLUCOSE SERPL-MCNC: 241 MG/DL (ref 65–140)
GLUCOSE SERPL-MCNC: 268 MG/DL (ref 65–140)
SARS-COV-2 RNA RESP QL NAA+PROBE: NEGATIVE

## 2020-09-23 PROCEDURE — 99239 HOSP IP/OBS DSCHRG MGMT >30: CPT | Performed by: PHYSICIAN ASSISTANT

## 2020-09-23 PROCEDURE — 82948 REAGENT STRIP/BLOOD GLUCOSE: CPT

## 2020-09-23 PROCEDURE — 87635 SARS-COV-2 COVID-19 AMP PRB: CPT | Performed by: PHYSICIAN ASSISTANT

## 2020-09-23 PROCEDURE — RECHECK: Performed by: PHYSICIAN ASSISTANT

## 2020-09-23 RX ORDER — DOCUSATE SODIUM 100 MG/1
100 CAPSULE, LIQUID FILLED ORAL 2 TIMES DAILY
Qty: 10 CAPSULE | Refills: 0
Start: 2020-09-23

## 2020-09-23 RX ORDER — METHOCARBAMOL 750 MG/1
750 TABLET, FILM COATED ORAL EVERY 6 HOURS SCHEDULED
Status: DISCONTINUED | OUTPATIENT
Start: 2020-09-23 | End: 2020-09-23 | Stop reason: HOSPADM

## 2020-09-23 RX ORDER — POLYETHYLENE GLYCOL 3350 17 G/17G
17 POWDER, FOR SOLUTION ORAL DAILY PRN
Status: DISCONTINUED | OUTPATIENT
Start: 2020-09-23 | End: 2020-09-23 | Stop reason: HOSPADM

## 2020-09-23 RX ORDER — LANOLIN ALCOHOL/MO/W.PET/CERES
3 CREAM (GRAM) TOPICAL
Refills: 0
Start: 2020-09-23

## 2020-09-23 RX ORDER — POLYETHYLENE GLYCOL 3350 17 G/17G
17 POWDER, FOR SOLUTION ORAL DAILY PRN
Qty: 14 EACH | Refills: 0
Start: 2020-09-23

## 2020-09-23 RX ORDER — LIDOCAINE 50 MG/G
2 PATCH TOPICAL DAILY
Refills: 0
Start: 2020-09-24

## 2020-09-23 RX ORDER — LANOLIN ALCOHOL/MO/W.PET/CERES
3 CREAM (GRAM) TOPICAL
Status: DISCONTINUED | OUTPATIENT
Start: 2020-09-23 | End: 2020-09-23 | Stop reason: HOSPADM

## 2020-09-23 RX ORDER — IBUPROFEN 600 MG/1
600 TABLET ORAL EVERY 8 HOURS PRN
Qty: 30 TABLET | Refills: 0
Start: 2020-09-23

## 2020-09-23 RX ORDER — INSULIN GLARGINE 100 [IU]/ML
12 INJECTION, SOLUTION SUBCUTANEOUS
Refills: 0
Start: 2020-09-23

## 2020-09-23 RX ORDER — MUSCLE RUB CREAM 100; 150 MG/G; MG/G
CREAM TOPICAL 3 TIMES DAILY
Refills: 0
Start: 2020-09-23

## 2020-09-23 RX ORDER — DOCUSATE SODIUM 100 MG/1
100 CAPSULE, LIQUID FILLED ORAL 2 TIMES DAILY
Status: DISCONTINUED | OUTPATIENT
Start: 2020-09-23 | End: 2020-09-23 | Stop reason: HOSPADM

## 2020-09-23 RX ORDER — ACETAMINOPHEN 325 MG/1
975 TABLET ORAL EVERY 8 HOURS SCHEDULED
Qty: 30 TABLET | Refills: 0
Start: 2020-09-23

## 2020-09-23 RX ORDER — LIDOCAINE 50 MG/G
2 PATCH TOPICAL DAILY
Status: DISCONTINUED | OUTPATIENT
Start: 2020-09-24 | End: 2020-09-23 | Stop reason: HOSPADM

## 2020-09-23 RX ORDER — METHOCARBAMOL 750 MG/1
750 TABLET, FILM COATED ORAL EVERY 6 HOURS SCHEDULED
Refills: 0
Start: 2020-09-23

## 2020-09-23 RX ORDER — OXYCODONE HYDROCHLORIDE 5 MG/1
2.5 TABLET ORAL EVERY 4 HOURS PRN
Qty: 10 TABLET | Refills: 0 | Status: SHIPPED | OUTPATIENT
Start: 2020-09-23 | End: 2020-09-28

## 2020-09-23 RX ADMIN — LEVETIRACETAM 500 MG: 500 TABLET, FILM COATED ORAL at 09:52

## 2020-09-23 RX ADMIN — CLOPIDOGREL BISULFATE 75 MG: 75 TABLET ORAL at 09:52

## 2020-09-23 RX ADMIN — MENTHOL, METHYL SALICYLATE: 10; 15 CREAM TOPICAL at 09:55

## 2020-09-23 RX ADMIN — ENOXAPARIN SODIUM 40 MG: 40 INJECTION SUBCUTANEOUS at 09:53

## 2020-09-23 RX ADMIN — LISINOPRIL 30 MG: 20 TABLET ORAL at 09:52

## 2020-09-23 RX ADMIN — ATORVASTATIN CALCIUM 40 MG: 40 TABLET, FILM COATED ORAL at 09:52

## 2020-09-23 RX ADMIN — DOCUSATE SODIUM 100 MG: 100 CAPSULE ORAL at 12:52

## 2020-09-23 RX ADMIN — METHOCARBAMOL TABLETS 750 MG: 750 TABLET, COATED ORAL at 12:52

## 2020-09-23 RX ADMIN — METHOCARBAMOL 500 MG: 500 TABLET ORAL at 00:34

## 2020-09-23 RX ADMIN — AMLODIPINE BESYLATE 10 MG: 10 TABLET ORAL at 09:52

## 2020-09-23 RX ADMIN — KETOROLAC TROMETHAMINE 15 MG: 30 INJECTION, SOLUTION INTRAMUSCULAR at 10:11

## 2020-09-23 RX ADMIN — ACETAMINOPHEN 975 MG: 325 TABLET, FILM COATED ORAL at 12:52

## 2020-09-23 RX ADMIN — METHOCARBAMOL 500 MG: 500 TABLET ORAL at 05:22

## 2020-09-23 RX ADMIN — INSULIN LISPRO 2 UNITS: 100 INJECTION, SOLUTION INTRAVENOUS; SUBCUTANEOUS at 09:55

## 2020-09-23 RX ADMIN — FENOFIBRATE 145 MG: 145 TABLET, COATED ORAL at 09:52

## 2020-09-23 RX ADMIN — ACETAMINOPHEN 975 MG: 325 TABLET, FILM COATED ORAL at 05:22

## 2020-09-23 RX ADMIN — INSULIN LISPRO 6 UNITS: 100 INJECTION, SOLUTION INTRAVENOUS; SUBCUTANEOUS at 12:53

## 2020-09-23 RX ADMIN — POLYETHYLENE GLYCOL 3350 17 G: 17 POWDER, FOR SOLUTION ORAL at 17:06

## 2020-09-23 RX ADMIN — LEVOTHYROXINE SODIUM 75 MCG: 75 TABLET ORAL at 05:22

## 2020-09-23 RX ADMIN — LEVETIRACETAM 500 MG: 500 TABLET, FILM COATED ORAL at 17:06

## 2020-09-23 RX ADMIN — CLONIDINE HYDROCHLORIDE 0.3 MG: 0.1 TABLET ORAL at 09:51

## 2020-09-23 RX ADMIN — MENTHOL, METHYL SALICYLATE: 10; 15 CREAM TOPICAL at 17:07

## 2020-09-23 RX ADMIN — METHOCARBAMOL TABLETS 750 MG: 750 TABLET, COATED ORAL at 17:06

## 2020-09-23 RX ADMIN — HYDROCHLOROTHIAZIDE 25 MG: 25 TABLET ORAL at 09:52

## 2020-09-23 RX ADMIN — LIDOCAINE 5% 1 PATCH: 700 PATCH TOPICAL at 09:52

## 2020-09-23 RX ADMIN — MELATONIN 3 MG: at 03:40

## 2020-09-23 RX ADMIN — INSULIN LISPRO 4 UNITS: 100 INJECTION, SOLUTION INTRAVENOUS; SUBCUTANEOUS at 17:07

## 2020-09-23 RX ADMIN — DOCUSATE SODIUM 100 MG: 100 CAPSULE ORAL at 17:06

## 2020-09-23 NOTE — ASSESSMENT & PLAN NOTE
· Continue Plavix and statin  · Patient's  reported to me that she has had slurred speech ever since her stroke a long time ago but that sometimes it sounds worse than other times and recently it seems to be more noticeable     · Performed routine head CT which showed evidence of scar from prior stroke and no evidence of recent acute event  · Outpatient speech therapy

## 2020-09-23 NOTE — ASSESSMENT & PLAN NOTE
· Patient had severely elevated blood pressure on admission of 218/98  This is likely due to her known history of difficult to treat hypertension requiring multiple agents as well as exacerbated by pain  In addition apparently she did not take her home medicine on the day of admission  · Continue home regimen of Norvasc, clonidine, HCTZ, lisinopril   P r n  IV labetalol   · Blood pressure overall improved    One episode of low blood pressure noted but overall regulated  · Pain control regimen seems to have facilitated regulation of her blood pressure

## 2020-09-23 NOTE — ASSESSMENT & PLAN NOTE
· Continue Plavix and statin  · Patient's  reported to me that she has had slurred speech ever since her stroke a long time ago but that sometimes it sounds worse than other times and recently it seems to be more noticeable     · Performed routine head CT which showed evidence of scar from prior stroke and no evidence of recent acute event

## 2020-09-23 NOTE — CASE MANAGEMENT
CM reviewed with patient and  that a post acute care recommendation was made by your care team for [facility]  Discussed Freedom of Choice with both patient and caregiver  List of facilities given to both patient and caregiver via in person  both patient and caregiver aware the list is custom filtered for them by preferred and that [facility] post acute providers are designated  Patient has been to [facility] in the past and this would be their preference  CM discussed making additional referrals in case they do not have beds available and patient and family were agreeable  CM spoke with admissions at [facility] and they are able to accept once COVID result is in     CM discussed with patient and  acceptance to [facility] and  transportation to facility and that a [facility] will be an out of pocket cost   They acknowledged understanding of this  CM called [facility] and spoke with Alisia Sheehan to request [facility]  B is waiting for the COVID test to result before accepting her which should be in the next hour  CM requested a transport for anytime after 3:00 PM     CM department will continue to follow to assist with discharge coordination

## 2020-09-23 NOTE — CASE MANAGEMENT
CM received notification from [place] that [facility] is the earliest transport  They will be sending a BLS billing it as [facility] at 7:30 PM     CM provided update to primary nurse, SLIM, patient and her , and [facility]  CM reviewed the availability of treatment team to discuss questions or concerns patient and/or family may have regarding  understanding medications and recognizing signs and symptoms once discharged  CM also encouraged patient to follow up with all recommended appointments after discharge  Patient advised of importance for patient and family to participate in managing patient's medical well being

## 2020-09-23 NOTE — ASSESSMENT & PLAN NOTE
Lab Results   Component Value Date    HGBA1C 7 9 (H) 09/22/2020       Recent Labs     09/22/20  1117 09/22/20  1552 09/22/20 2049 09/23/20  0730   POCGLU 265* 126 168* 158*       Blood Sugar Average: Last 72 hrs:  (P) 238 75 A1c shows inadequate control  · Blood sugars noted to be uncontrolled initially on admission  Patient is not receiving any steroids  · Increased Lantus and lispro dosing  Continue sliding scale coverage    Now significantly improved  · Continue diabetic diet

## 2020-09-23 NOTE — ASSESSMENT & PLAN NOTE
· Patient presented with 1 week of low back pain which appears to be fairly focal and non-radiating, also associated with inability to ambulate due to pain  No clear inciting event  · X-ray shows some lumbar degenerative disease with worsening L5/L6 spondylolisthesis, without acute fracture  CT scan done in the context of CTA to rule out dissection confirmed same  · In the absence of any radiculopathic findings we can likely hold off on ordering MRI of the lumbar sacral spine temporarily and orthopedic consult, but should she fail conservative measures this may be needed  · Consulted physical therapy--appreciate their input  Patient is continuing to have issues with leg buckling  Recommended for rehab  · Pain regimen as follows:  Around the clock Tylenol 975 t i d , topical menthol and lidocaine daily, Robaxin 500 mg q i d  Increase dosing to 750 Around the clock, Toradol 15 mg IV q 6 hours p r n  Moderate breakthrough pain, oxycodone 5 mg for severe pain  Also utilize stretching and heating pad    Patient reports current pain regimen to be affective and she has not even required narcotics  · Add bowel regimen

## 2020-09-23 NOTE — DISCHARGE SUMMARY
Discharge- Lex Jerez 1950, 72 y o  female MRN: [id number]    Unit/Bed#: S -01 Encounter: [id number]    Primary Care Provider: Rosa Grullon DO   Date and time admitted to hospital: 9/21/2020  7:47 AM  * Acute low back pain  Assessment & Plan  · Patient presented with 1 week of low back pain which appears to be fairly focal and non-radiating, also associated with inability to ambulate due to pain  No clear inciting event  · X-ray shows some lumbar degenerative disease with worsening L5/L6 spondylolisthesis, without acute fracture  CT scan done in the context of CTA to rule out dissection confirmed same  · In the absence of any radiculopathic findings we can likely hold off on ordering MRI of the lumbar sacral spine temporarily and orthopedic consult, but should she fail conservative measures this may be needed  · Consulted physical therapy--appreciate their input  Patient is continuing to have issues with leg buckling  Recommended for rehab  · Pain regimen as follows:  Around the clock Tylenol 975 t i d , topical menthol and lidocaine daily, Robaxin 500 mg q i d  Increase dosing to 750 Around the clock, Toradol 15 mg IV q 6 hours p r n  Moderate breakthrough pain, oxycodone 5 mg for severe pain  Also utilize stretching and heating pad  Patient reports current pain regimen to be affective and she has not even required narcotics  · Added bowel regimen    Hypertensive urgency  Assessment & Plan  · Patient had severely elevated blood pressure on admission of 218/98  This is likely due to her known history of difficult to treat hypertension requiring multiple agents as well as exacerbated by pain  In addition apparently she did not take her home medicine on the day of admission  · Continue home regimen of Norvasc, clonidine, HCTZ, lisinopril   P r n  IV labetalol   · Blood pressure overall improved    One episode of low blood pressure noted but overall regulated  · Pain control regimen seems to have facilitated regulation of her blood pressure    Urinary retention  Assessment & Plan  · Patient had difficulty passing urine in ER  Noted to have bladder scan with 390 mL  Straight cath x1 done   · Now has been urinating without difficulty and nursing reports no additional events of urinary retention on protocol  · UA revealed 3+ proteinuria, otherwise nitrate/wbc negative    DM2 (diabetes mellitus, type 2) [facility])  Assessment & Plan  Lab Results   Component Value Date    HGBA1C 7 9 (H) 09/22/2020       Recent Labs     09/22/20  1552 09/22/20  2049 09/23/20  0730 09/23/20  1121   POCGLU 126 168* 158* 268*       Blood Sugar Average: Last 72 hrs:  (P) 242 A1c shows inadequate control  · Blood sugars noted to be uncontrolled initially on admission  Patient is not receiving any steroids  · Increased Lantus and lispro dosing  Continue sliding scale coverage  Now significantly improved  · Continue diabetic diet  · Resume metformin    History of stroke  Assessment & Plan  · Continue Plavix and statin  · Patient's  reported to me that she has had slurred speech ever since her stroke a long time ago but that sometimes it sounds worse than other times and recently it seems to be more noticeable     · Performed routine head CT which showed evidence of scar from prior stroke and no evidence of recent acute event  · Outpatient speech therapy    Discharging Physician / Practitioner: Romeo Mckinney PA-C  PCP: Andrea Leonardo DO  Admission Date:   Admission Orders (From admission, onward)     Ordered        09/22/20 1403  Inpatient Admission  Once         09/21/20 1341  Place in Observation  Once                   Discharge Date: 09/23/20    Resolved Problems  Date Reviewed: 9/23/2020    None          Consultations During Hospital Stay:  · None    Procedures Performed:   · Lumbar x-ray  · Has    Significant Findings / Test Results:   · As above    Incidental Findings:   · None     Test Results Pending at Discharge (will require follow up): · None     Outpatient Tests Requested:  · None    Complications:  None    Reason for Admission:  Severe back pain    Hospital Course:     Tess Pan is a 72 y o  female patient who originally presented to the hospital on 9/21/2020 due to severe acute low back pain  Patient did not have any inciting injury noted  X-ray showed increased degenerative spondylolisthesis of L5 on L6  Given the lack of radiculopathic associated symptoms we did not proceed with MRI or orthopedic consultation  Pain overall seem to be relatively controlled with regimen of Tylenol, topical agents and antispasmodics  Patient however had difficulty ambulating due to leg buckling and therefore was recommended by PT for short-term rehab  In addition she has underlying history of stroke with chronic dysarthria which reportedly worsens at times per   This prompted us to do a head CT which showed chronic stroke with no acute events  Patient is also noted to have poorly controlled hypertension and diabetes with improvement in these values over the course of her hospitalization  She is stable for discharge today to rehab and an outpatient orthopedic appointment with spine surgeon would be reasonable if pain is not resolved within 2 weeks  Please see above list of diagnoses and related plan for additional information  Condition at Discharge: stable     Discharge Day Visit / Exam:     * Please refer to separate progress note for these details *    Discussion with Family:   at bedside    Discharge instructions/Information to patient and family:   See after visit summary for information provided to patient and family  Provisions for Follow-Up Care:  See after visit summary for information related to follow-up care and any pertinent home health orders        Disposition:  [facility] short-term rehab    Planned Readmission: none     Discharge Statement:  I spent 40 minutes discharging the patient  This time was spent on the day of discharge  I had direct contact with the patient on the day of discharge  Greater than 50% of the total time was spent examining patient, answering all patient questions, arranging and discussing plan of care with patient as well as directly providing post-discharge instructions  Additional time then spent on discharge activities  Discharge Medications:  See after visit summary for reconciled discharge medications provided to patient and family        ** Please Note: This note has been constructed using a voice recognition system **

## 2020-09-23 NOTE — ASSESSMENT & PLAN NOTE
· Patient presented with 1 week of low back pain which appears to be fairly focal and non-radiating, also associated with inability to ambulate due to pain  No clear inciting event  · X-ray shows some lumbar degenerative disease with worsening L5/L6 spondylolisthesis, without acute fracture  CT scan done in the context of CTA to rule out dissection confirmed same  · In the absence of any radiculopathic findings we can likely hold off on ordering MRI of the lumbar sacral spine temporarily and orthopedic consult, but should she fail conservative measures this may be needed  · Consulted physical therapy--appreciate their input  Patient is continuing to have issues with leg buckling  Recommended for rehab  · Pain regimen as follows:  Around the clock Tylenol 975 t i d , topical menthol and lidocaine daily, Robaxin 500 mg q i d  Increase dosing to 750 Around the clock, Toradol 15 mg IV q 6 hours p r n  Moderate breakthrough pain, oxycodone 5 mg for severe pain  Also utilize stretching and heating pad    Patient reports current pain regimen to be affective and she has not even required narcotics  · Added bowel regimen

## 2020-09-23 NOTE — ASSESSMENT & PLAN NOTE
Lab Results   Component Value Date    HGBA1C 7 9 (H) 09/22/2020       Recent Labs     09/22/20  1552 09/22/20  2049 09/23/20  0730 09/23/20  1121   POCGLU 126 168* 158* 268*       Blood Sugar Average: Last 72 hrs:  (P) 242 A1c shows inadequate control  · Blood sugars noted to be uncontrolled initially on admission  Patient is not receiving any steroids  · Increased Lantus and lispro dosing  Continue sliding scale coverage    Now significantly improved  · Continue diabetic diet  · Resume metformin

## 2020-09-23 NOTE — PROGRESS NOTES
Progress Note - Fairy Simmonds 1950, 72 y o  female MRN: [id number]    Unit/Bed#: S -01 Encounter: [id number]    Primary Care Provider: Shantanu Francisco DO   Date and time admitted to hospital: 9/21/2020  7:47 AM    * Acute low back pain  Assessment & Plan  · Patient presented with 1 week of low back pain which appears to be fairly focal and non-radiating, also associated with inability to ambulate due to pain  No clear inciting event  · X-ray shows some lumbar degenerative disease with worsening L5/L6 spondylolisthesis, without acute fracture  CT scan done in the context of CTA to rule out dissection confirmed same  · In the absence of any radiculopathic findings we can likely hold off on ordering MRI of the lumbar sacral spine temporarily and orthopedic consult, but should she fail conservative measures this may be needed  · Consulted physical therapy--appreciate their input  Patient is continuing to have issues with leg buckling  Recommended for rehab  · Pain regimen as follows:  Around the clock Tylenol 975 t i d , topical menthol and lidocaine daily, Robaxin 500 mg q i d  Increase dosing to 750 Around the clock, Toradol 15 mg IV q 6 hours p r n  Moderate breakthrough pain, oxycodone 5 mg for severe pain  Also utilize stretching and heating pad  Patient reports current pain regimen to be affective and she has not even required narcotics  · Add bowel regimen    Hypertensive urgency  Assessment & Plan  · Patient had severely elevated blood pressure on admission of 218/98  This is likely due to her known history of difficult to treat hypertension requiring multiple agents as well as exacerbated by pain  In addition apparently she did not take her home medicine on the day of admission  · Continue home regimen of Norvasc, clonidine, HCTZ, lisinopril   P r n  IV labetalol   · Blood pressure overall improved    One episode of low blood pressure noted but overall regulated  · Pain control regimen seems to have facilitated regulation of her blood pressure    Urinary retention  Assessment & Plan  · Patient had difficulty passing urine in ER  Noted to have bladder scan with 390 mL  Straight cath x1 done   · Now has been urinating without difficulty and nursing reports no additional events of urinary retention on protocol  · UA revealed 3+ proteinuria, otherwise nitrate/wbc negative    DM2 (diabetes mellitus, type 2) [facility])  Assessment & Plan  Lab Results   Component Value Date    HGBA1C 7 9 (H) 09/22/2020       Recent Labs     09/22/20  1117 09/22/20  1552 09/22/20  2049 09/23/20  0730   POCGLU 265* 126 168* 158*       Blood Sugar Average: Last 72 hrs:  (P) 238 75 A1c shows inadequate control  · Blood sugars noted to be uncontrolled initially on admission  Patient is not receiving any steroids  · Increased Lantus and lispro dosing  Continue sliding scale coverage  Now significantly improved  · Continue diabetic diet    History of stroke  Assessment & Plan  · Continue Plavix and statin  · Patient's  reported to me that she has had slurred speech ever since her stroke a long time ago but that sometimes it sounds worse than other times and recently it seems to be more noticeable  · Performed routine head CT which showed evidence of scar from prior stroke and no evidence of recent acute event    VTE Pharmacologic Prophylaxis:   Pharmacologic: Enoxaparin (Lovenox)  Mechanical VTE Prophylaxis in Place: No    Patient Centered Rounds: I have performed bedside rounds with nursing staff today  Discussions with Specialists or Other Care Team Provider: case mgmt, PT    Education and Discussions with Family / Patient:     Time Spent for Care: 25 min  More than 50% of total time spent on counseling and coordination of care as described above      Current Length of Stay: 1 day(s)    Current Patient Status: Inpatient   Certification Statement: The patient will continue to require additional inpatient hospital stay due to awaiting rehab    Discharge Plan:  Awaiting discharge to short-term rehab    Code Status: Level 1 - Full Code    Subjective:   Patient has not had a BM since admission  No urinary retention  Patient was tearful and said she didn't sleep last night and is having a lot of pain right now, maybe because how she slept  She felt better after repositioning a bit and asked for a heating pad  Objective:     Vitals:   Temp (24hrs), Av 2 °F (36 8 °C), Min:97 9 °F (36 6 °C), Max:98 7 °F (37 1 °C)    Temp:  [97 9 °F (36 6 °C)-98 7 °F (37 1 °C)] 98 7 °F (37 1 °C)  HR:  [60-75] 60  Resp:  [17-18] 17  BP: ()/(58-78) 165/78  SpO2:  [90 %-94 %] 90 %  Body mass index is 34 42 kg/m²  Input and Output Summary (last 24 hours): Intake/Output Summary (Last 24 hours) at 2020 1056  Last data filed at 2020 0530  Gross per 24 hour   Intake 420 ml   Output 850 ml   Net -430 ml       Physical Exam:     Physical Exam  Vitals signs reviewed  Constitutional:       General: She is not in acute distress  Appearance: Normal appearance  She is normal weight  She is not ill-appearing, toxic-appearing or diaphoretic  Comments: Tearful and clearly uncomfortable at this time   HENT:      Nose: No congestion or rhinorrhea  Mouth/Throat:      Pharynx: No oropharyngeal exudate or posterior oropharyngeal erythema  Eyes:      General: No scleral icterus  Right eye: No discharge  Left eye: No discharge  Cardiovascular:      Rate and Rhythm: Normal rate and regular rhythm  Heart sounds: No murmur  Pulmonary:      Effort: Pulmonary effort is normal  No respiratory distress  Breath sounds: Normal breath sounds  No stridor  No wheezing or rhonchi  Abdominal:      General: There is no distension  Palpations: Abdomen is soft  Tenderness: There is no abdominal tenderness  There is no guarding  Musculoskeletal:         General: Tenderness (low back) present   No swelling, deformity or signs of injury  Right lower leg: No edema  Left lower leg: No edema  Skin:     General: Skin is warm and dry  Coloration: Skin is not jaundiced or pale  Findings: No bruising, erythema, lesion or rash (no HZ)  Neurological:      Mental Status: She is alert  Comments: Awake alert interactive  Ongoing mild dysarthria, no worsening  Additional Data:     Labs:    Results from last 7 days   Lab Units 09/21/20  0823   WBC Thousand/uL 6 91   HEMOGLOBIN g/dL 13 9   HEMATOCRIT % 41 5   PLATELETS Thousands/uL 268   NEUTROS PCT % 65   LYMPHS PCT % 20   MONOS PCT % 11   EOS PCT % 3     Results from last 7 days   Lab Units 09/22/20  0445 09/21/20  0823   SODIUM mmol/L 138 141   POTASSIUM mmol/L 3 5 3 5   CHLORIDE mmol/L 104 104   CO2 mmol/L 25 24   BUN mg/dL 22 18   CREATININE mg/dL 0 97 0 83   ANION GAP mmol/L 9 13   CALCIUM mg/dL 8 7 9 0   ALBUMIN g/dL  --  3 4*   TOTAL BILIRUBIN mg/dL  --  0 90   ALK PHOS U/L  --  59   ALT U/L  --  28   AST U/L  --  22   GLUCOSE RANDOM mg/dL 347* 206*         Results from last 7 days   Lab Units 09/23/20  0730 09/22/20  2049 09/22/20  1552 09/22/20  1117 09/22/20  0742 09/22/20  0444 09/21/20  2057 09/21/20  1806   POC GLUCOSE mg/dl 158* 168* 126 265* 299* 346* 328* 220*     Results from last 7 days   Lab Units 09/22/20  0445   HEMOGLOBIN A1C % 7 9*           * I Have Reviewed All Lab Data Listed Above  * Additional Pertinent Lab Tests Reviewed:  All Labs Within Last 24 Hours Reviewed    Imaging:    Imaging Reports Reviewed Today Include:   Imaging Personally Reviewed by Myself Includes:      Recent Cultures (last 7 days):           Last 24 Hours Medication List:   Current Facility-Administered Medications   Medication Dose Route Frequency Provider Last Rate    acetaminophen  975 mg Oral Q8H [address] Ena Atkins PA-C      amLODIPine  10 mg Oral Daily Emerita Enriquez PA-C      atorvastatin  40 mg Oral Daily Ean Atkins PA-C      cloNIDine  0 3 mg Oral Daily Magdaline Maroon, ESTEFANI      clopidogrel  75 mg Oral Daily [facility]-FRANKIE      docusate sodium  100 mg Oral BID [facility], PA-FRANKIE      enoxaparin  40 mg Subcutaneous Daily [facility], PA-FRANKIE      fenofibrate  145 mg Oral Daily [facility], PA-C      FLUoxetine  20 mg Oral HS [facility], PA-C      hydrALAZINE  5 mg Intravenous Q8H PRN Shaq Perez, CRNP      hydrochlorothiazide  25 mg Oral Daily Magdaline Maroon, ESTEFANI      insulin glargine  12 Units Subcutaneous HS [facility], PA-C      insulin lispro  1-5 Units Subcutaneous HS Laclede Perez, CRNP      insulin lispro  2-12 Units Subcutaneous TID AC Laclede Perez, CRNP      insulin lispro  6 Units Subcutaneous TID AC Ena Stojames, ESTEFANI      ketorolac  15 mg Intravenous Q6H PRN Ena Stojames, ESTEFANI      Labetalol HCl  10 mg Intravenous Q6H PRN Ena Stojames, ESTEFANI      levETIRAcetam  500 mg Oral BID Ena Stojames, ESTEFANI      levothyroxine  75 mcg Oral Early Morning Ena Atkins PA-C      [START ON 9/24/2020] lidocaine  2 patch Topical Daily [facility], ESTEFANI      lisinopril  30 mg Oral Daily Magdaline Sherita, ESTEFANI      melatonin  3 mg Oral HS PRN Shaq Perez, HUSSAINNP      menthol-methyl salicylate   Apply externally TID Shireen Bonilla PA-C      methocarbamol  750 mg Oral Q6H [address] [facility], ESTEFANI      ondansetron  4 mg Intravenous Q4H PRN Ena Wilbert, ESTEFANI      oxyCODONE  5 mg Oral Q4H PRN Ena Stojames, ESTEFANI      polyethylene glycol  17 g Oral Daily PRN Shireen Bonilla PA-C          Today, Patient Was Seen By: Shireen Bonilla PA-C    ** Please Note: Dictation voice to text software may have been used in the creation of this document   **

## 2020-09-28 NOTE — PHYSICIAN ADVISOR
Current patient class: Inpatient  The patient is currently on Hospital Day: 3 at [facility]      The patient was admitted to the hospital at [phone] on 9/22/20 for the following diagnosis:  Back pain [M54 9]  Ambulatory dysfunction [R26 2]  Chronic bilateral low back pain without sciatica [M54 5, G89 29]       There is documentation in the medical record of an expected length of stay of at least 2 midnights  The patient is therefore expected to satisfy the 2 midnight benchmark and given the 2 midnight presumption is appropriate for INPATIENT ADMISSION  Given this expectation of a satisfying stay, CMS instructs us that the patient is most often appropriate for inpatient admission under part A provided medical necessity is documented in the chart  After review of the relevant documentation, labs, vital signs and test results, the patient is appropriate for INPATIENT ADMISSION  Admission to the hospital as an inpatient is a complex decision making process which requires the practitioner to consider the patients presenting complaint, history and physical examination and all relevant testing  With this in mind, in this case, the patient was deemed appropriate for INPATIENT ADMISSION  After review of the documentation and testing available at the time of the admission I concur with this clinical determination of medical necessity  Rationale is as follows: The patient is a 72 yrs old Female who presented to the ED at 9/21/2020  7:47 AM with a chief complaint of Back Pain (patient presents by ems for back pain fell today  has hx of falling due to muscle spasms )  Patient came with intractable pain  She was also noted to have hypertensive crisis on admission as well  Patient was admitted for the back pain  However on day 2 of admission the patient still was persistently hypertensive  She was also noted to have worsening dysarthria    As per the documentation the patient is need a greater than 2 midnights secondary to unsafe ambulation with intractable back pain in need of further workup as well as monitoring of blood sugars as well as blood pressures and also requiring a CT scan given the intermittent progressively worsening dysarthria  Patient did require IV Toradol as well as IV labetalol during the hospitalization  Patient also received IV hydralazine  Given the above the patient did cross the 2 midnight benchmark as set by Medicare and is inpatient status appropriate while receiving ongoing acute inpatient medical care      The patients vitals on arrival were   ED Triage Vitals [09/21/20 0751]   Temperature Pulse Respirations Blood Pressure SpO2   98 2 °F (36 8 °C) 68 18 (!) 218/98 100 %      Temp Source Heart Rate Source Patient Position - Orthostatic VS BP Location FiO2 (%)   Oral Monitor Lying Right arm --      Pain Score       5           Past Medical History:   Diagnosis Date    Aneurysm of basilar artery (HCC) 07/2015    Arthritis     Cardiac arrest ([facility] [address] ) 2014    Complex partial seizure with impairment of consciousness at onset [facility]) 07/2017    Dependent on wheelchair     "at times"    Diabetes mellitus ([facility] [address] )     IDDM    Disease of thyroid gland     Hyperlipidemia     Hypertension     Left hip pain     Left knee pain     Muscle weakness     right sided weakness    Risk for falls     Seizures ([facility] [address] )     "pt reports none recent"    Sleep apnea     "doesn't use machine"    Stenosis of middle cerebral artery 07/2015    Stroke ([facility] [address] )     B BG, R frontal, L parietal, L pontine    Unsteady gait     Uses walker      Past Surgical History:   Procedure Laterality Date    ABDOMINAL SURGERY      benign mass per pt    DILATION AND CURETTAGE OF UTERUS      HYSTERECTOMY      [place] TOTAL HIP ARTHROPLASTY Left 4/27/2018    Procedure: ARTHROPLASTY HIP TOTAL;  Surgeon: Gamaliel Yates MD;  Location: [facility];  Service: Orthopedics    TONSILLECTOMY      WISDOM TOOTH EXTRACTION Consults have been placed to:   None    Vitals:    09/22/20 1516 09/22/20 2212 09/23/20 0700 09/23/20 1537   BP: 97/58 149/70 165/78 124/58   BP Location: Left arm Left arm Left arm Right arm   Pulse: 63 75 60 66   Resp: 18 18 17 17   Temp: 97 9 °F (36 6 °C) 97 9 °F (36 6 °C) 98 7 °F (37 1 °C) 98 2 °F (36 8 °C)   TempSrc: Oral Oral Oral Oral   SpO2: 94% 93% 90% 92%   Weight:       Height:           Most recent labs:    No results for input(s): WBC, HGB, HCT, PLT, K, NA, CALCIUM, BUN, CREATININE, LIPASE, AMYLASE, INR, TROPONINI, CKTOTAL, AST, ALT, ALKPHOS, BILITOT in the last 72 hours  Scheduled Meds:  Continuous Infusions:No current facility-administered medications for this encounter  PRN Meds:      Surgical procedures (if appropriate):

## 2020-09-30 ENCOUNTER — OFFICE VISIT (OUTPATIENT)
Dept: OBGYN CLINIC | Facility: CLINIC | Age: 70
End: 2020-09-30
Payer: MEDICARE

## 2020-09-30 ENCOUNTER — TELEPHONE (OUTPATIENT)
Dept: OBGYN CLINIC | Facility: HOSPITAL | Age: 70
End: 2020-09-30

## 2020-09-30 VITALS
BODY MASS INDEX: 34.43 KG/M2 | WEIGHT: 164 LBS | HEIGHT: 58 IN | HEART RATE: 75 BPM | DIASTOLIC BLOOD PRESSURE: 73 MMHG | SYSTOLIC BLOOD PRESSURE: 114 MMHG

## 2020-09-30 DIAGNOSIS — M54.40 ACUTE MIDLINE LOW BACK PAIN WITH SCIATICA, SCIATICA LATERALITY UNSPECIFIED: Primary | ICD-10-CM

## 2020-09-30 DIAGNOSIS — M43.16 SPONDYLOLISTHESIS AT L4-L5 LEVEL: ICD-10-CM

## 2020-09-30 DIAGNOSIS — M51.36 DDD (DEGENERATIVE DISC DISEASE), LUMBAR: ICD-10-CM

## 2020-09-30 PROCEDURE — 99204 OFFICE O/P NEW MOD 45 MIN: CPT | Performed by: ORTHOPAEDIC SURGERY

## 2020-09-30 NOTE — PROGRESS NOTES
Assessment/Plan:  Acute on chronic lower back and leg pains  Chronic lower back pain with ambulatory dysfunction  Neurogenic claudication    Patient is currently in a nursing facility with ongoing rehab  She will likely need an MRI of the lumbar spine to help guide further treatment which will likely include injections with pain management    No problem-specific Assessment & Plan notes found for this encounter  Diagnoses and all orders for this visit:    Acute midline low back pain with sciatica, sciatica laterality unspecified    DDD (degenerative disc disease), lumbar    Spondylolisthesis at L4-L5 level          Subjective:      Patient ID: Alicia Richardson is a 72 y o  female  HPI    The following portions of the patient's history were reviewed and updated as appropriate: allergies, current medications, past family history, past medical history, past social history, past surgical history and problem list     Review of Systems    Lower back and leg pains  Progressive difficulty walking  Objective:      /73   Pulse 75   Ht 4' 10" (1 473 m)   Wt 74 4 kg (164 lb)   BMI 34 28 kg/m²       CT imaging is reviewed and this demonstrates multilevel thoracolumbar DDD  She does have grade 1 spondylolisthesis at L4-5  She appears to have transitional lumbosacral anatomy     Physical Exam      Awake alert and oriented x3  Affect is appropriate  She is sitting comfortably in a wheelchair  She is minimally tender to palpation over the lumbosacral spine  She has negative straight leg raise bilaterally  4+ out of 5 strength L2-S1 bilaterally  Sensation is grossly intact to light touch

## 2020-09-30 NOTE — TELEPHONE ENCOUNTER
Dr Heidi New from [facility] called for today's office notes  Electronically faxed to [phone]    When would Dr Praveena Mejia like to see patient again?     Sarahi Catalan # [phone] x [phone]

## 2020-09-30 NOTE — TELEPHONE ENCOUNTER
F/U after MRI Patient : Reina Arroyo Age: 78 year old Sex: female   MRN: [id number] Encounter Date: 2/24/2018  E03/03     History     Chief Complaint   Patient presents with   • Dizziness   • Weakness   • Diarrhea (Adult)     HPI   2/24/2018  11:34 AM Reina Arroyo is a 78 year old female who presents to the ED from the infusion clinic ? diffuse weakness that began today. Pt was discharged from the hospital on 2/19/18 after undergoing treatment for right knee septic arthritis. She had a PICC line placed and she was due for her first dose of IV antibiotics today. A the infusion clinic, the pt reported significant weakness and light-headedness which prompted presentation in the ED. She also reports chills, chronic SOB, and diarrhea x6 today. She denies fever, nausea, vomiting, blood in stool, or any other associated sx. The patient verbalizes no further complaints or modifying factors at this time.    PCP: Anne Rousseau MD     Allergies   Allergen Reactions   • Ace Inhibitors Cough   • Feldene [Piroxicam]      Malaise     • Levaquin [Levofloxacin] ARTHRALGIA   • Zestril [Lisinopril] Cough       Prior to Admission Medications    ACETAMINOPHEN (TYLENOL) 325 MG TABLET    Take 2 tablets by mouth every 4 hours as needed for Pain or Fever.    ASPIRIN 81 MG TABLET    Take 1 tablet by mouth daily.    ATORVASTATIN (LIPITOR) 40 MG TABLET    TAKE ONE TABLET BY MOUTH ONCE DAILY    FUROSEMIDE (LASIX) 40 MG TABLET    TAKE TWO TABLETS BY MOUTH IN THE MORNING    HYDROCODONE-ACETAMINOPHEN (NORCO) 5-325 MG PER TABLET    Take 1 tablet by mouth every 6 hours as needed for Pain.    LOSARTAN (COZAAR) 100 MG TABLET    TAKE ONE TABLET BY MOUTH ONCE DAILY    METOPROLOL SUCCINATE (TOPROL-XL) 100 MG 24 HR TABLET    Take 1 tablet by mouth daily.    MULTIPLE VITAMIN (DAILY MULTIVITAMIN) OR TABS    Take 1 tablet by mouth daily    RIVAROXABAN (XARELTO) 15 MG TAB    Take 1 tablet by mouth daily (with dinner).    SODIUM CHLORIDE 0.9 % SOLUTION 100  ML WITH ERTAPENEM 1 G RECON SOLN 1,000 MG    Inject 1,000 mg into the vein daily for 14 days.     Past Medical History:   Diagnosis Date   • Acute systolic heart failure (CMS/[facility]) 7/11/2016   • Arthritis    • ASHD (arteriosclerotic heart disease)    • Blood clot associated with vein wall inflammation     Left arm DVT   • Carcinoma in situ of breast 02/10/2009   • Carotid stenosis, left 2000   • Carotid stenosis, right     <50%  5/2012   • Cerebral infarction (CMS/[facility]) 12/2017    visual defect - upon testing, thought to be due to a small occipital stroke (possibly related to Afib?)   • DVT (deep venous thrombosis) (CMS/[facility]) 11/2013    LUE, secondary to pacer change   • Hyperlipidemia 01/01/2000   • Hypertension 01/01/2000   • Hyperthyroidism 02/11/2011    Amiodarone Dc'd   • IHSS (idiopathic hypertrophic subaortic stenosis) (CMS/[facility]) 2001   • Lower GI bleed 01/01/2001    ichemic colitis   • Malignant neoplasm of central portion of female breast (CMS/[facility]) 2009    Bilateral breast cancer; S/P mastectomy   • Neurocardiogenic Syncope 2002   • Occipital neuralgia 11/2017    resolved now (2/2018)   • Old myocardial infarction 3/2008   • Osteoporosis    • Venous stasis dermatitis of both lower extremities    • Ventricular tachycardia (CMS/[facility])    • Vitamin D deficiency 12/2009    19   • Wears dentures     upper denture, lower partial   • Wears glasses        Past Surgical History:   Procedure Laterality Date   • APPENDECTOMY  01/01/1958   • BIOPSY OF BREAST, NEEDLE CORE Right 02/10/2009    right- intermediate grade DCIS   • BIOPSY OF BREAST, NEEDLE CORE Left 02/27/2009    left - intermediate grade DCIS   • BREAST SURGERY      Bilateral mastectomy   • CARDIAC CATHERIZATION  08/02/2016    coronary arteries ok. Patent stent of LAD. Hypertrophic cardiomyopathy involving the apex, with obstruction of apex during systole. Status/post alcohol ablation for IHSS.   • CARDIOVERSION  02/08/2018   • COLONOSCOPY  09/2001    ichemic  colitis   • COLONOSCOPY W BIOPSY  09/2012    hyperplastic polyp   • DEXA BONE DENSITY AXIAL SKELETON  07/2014    T score -0.9   • ECHO M-MODE/2D/DOPPLER (ROUTINE)  05/25/2012    EF 65%, diastolic dysfunction. Apical hypertrophic cardiomyopathy: constrast used and no pouch is identified. Normal LV size and systolic function. Mid and basal septum is severely hypokinetic: S/P alcohol ablation: no MICK: elongated MV leaflets. Diastolic dysfunction: fused E & A and depressed e'. No pericardial effusion. Suboptimal Doppler to estimated PASP   • ECHO M-MODE/2D/DOPPLER (ROUTINE)  07/08/2014    EF: 60% Mildly increased left ventricular cavity size. Left ventricular apical hypertrophy. Moderate pulmonary hypertension. Moderate to severe mitral valve regurgitation. Mild tricuspid valve regurgitation.   • ECHO M-MODE/2D/DOPPLER (ROUTINE)  02/16/2015    EF: 56% Apical Hypertrophic Cardiomyopathy. Regional wall motion abnormalities with inferior basal , basal anteroseptal and inferosetapl basal hypokinesia. It is not clear if it represents coronary disease, or thin basal walls, seen in apical hypertrophic cardiomyopathy. Most likely represent post alcohol septal ablation. It is unchanged. Moderate mitral valve regurgitation. No change since the pr   • ECHO M-MODE/2D/DOPPLER (ROUTINE)  07/02/2016    EF: 45% Patient with h/o alcohol ablation.. Mild segmental LV systolic dysfunction. Apical hypertrophic cardiomyopathy with cavity obliteration in the LV apex and an apical aneurysm. There is a resting systolic apical gradient of 43mmHg (there was limited Doppler interrogation of the apex available). There is apical diastolic flow reversal with color Doppler. The mid and basal inferio, infero-late   • ECHOCARDIOGRAM  4/16/2007    EF 52%   • ECHOCARDIOGRAM  10/25/2006    IHSS with severe gradient, nml LV size and function   • ELECTROPHYSIOLOGY CASE  01/25/2002    neg. for inducible SVT or VT, short runs AT   • EP ABLATION  10/20/14     cryoablation for AF   • EP STUDY  02/19/2007    moderate his purkinje disease inducible AT/XAVIER, inducible self terming polymorphic VT   • HB TILT TABLE TESTING  01/25/2002    positive   • HYSTERECTOMY  1974    total abdominal hyst- due to endometriosis   • ICD GENERATOR CHANGE  10/22/2013    [employer] CN1556-02 dual chamber   • ICD IMPLANT  02/19/2007    [employer] V-365,DFT 15 J   • KNEE SCOPE,CLEAN/DRAIN  2/14/2018   • LEFT HEART CATH INCLUDING LEFT VENTICULOGRAPHY W MD SUP AND INTERP  10/07/2010    patent LAD stent, severe hypertrophy mid chamber to apex with preserved systolic function   • MASTEC,MOD RADICAL Bilateral 04/01/2009    bilateral- L- DCIS, R DCIS with small area of invasive CA   • NM PARI PER RST/STRS PHARMACOLO  09/13/2010    nondiagnostic ECG secondary to LVH and repolarization changes   • NM MYOCARDIAL PERFUSION REST OR STRESS MULTI  08/11/2014    EF: 46%. Summed stress score: 16. Abnormal myocardial perfusion scans during  regadenoson/low-level exercise consistent with moderate sized infarction of the septum from the base. There is no convincing evidence of ischemia. Reduced post-regadenoson left ventricular systolic function. Diastolic dysfunction was noted. Cardiac Risk Assessment: Intermediate   • PTCA WITH STENT  02/15/2007    mid LAD   • PTCA WITH STENT  04/15/2007    proximal LAD   • PULMONARY FUNCTION TEST  04/19/2010    small airway disease   • SEPTAL ALCOHOL ABLATION  02/15/2007    complicated by LBBB, transient CHB, NSVT   • STRESS TEST  09/13/2010    Adenosine stress test -abnormal LVEF 63   • THROMBOENDART W/W0 GRAFT CAROTID  NECK INCS Left 03/04/2009    Carotid Endarterectomy- left   • US CAROTID DUPLEX BILATERAL Bilateral 05/25/2012   • XRAY MAMMOGRAM DIAGNOSTIC RT  01/29/2009    suspicious microcalcifications       Family History   Problem Relation Age of Onset   • Neurological Disorder Mother      Alzheimers   • Cancer Mother      breast   • Cancer Father      lung   • Cancer Sister       breast   • Cancer Daughter      breast   • Cancer Daughter      breast       Social History   Substance Use Topics   • Smoking status: Former Smoker     Packs/day: 1.00     Years: 30.00     Types: Cigarettes     Quit date: 1/1/2000   • Smokeless tobacco: Never Used   • Alcohol use 0.6 oz/week     1 Standard drinks or equivalent per week      Comment: occasional; 1-2 drinks monthly       Review of Systems   Constitutional: Positive for chills. Negative for activity change, appetite change, fatigue and fever.   HENT: Negative for congestion.    Eyes: Negative for discharge and visual disturbance.   Respiratory: Negative for cough and shortness of breath.    Cardiovascular: Negative for chest pain and leg swelling.   Gastrointestinal: Positive for diarrhea. Negative for abdominal pain, anal bleeding, blood in stool, nausea and vomiting.   Endocrine: Negative for heat intolerance.   Genitourinary: Negative for decreased urine volume, difficulty urinating, dysuria, frequency, hematuria and urgency.   Musculoskeletal: Negative for arthralgias, myalgias and neck pain.   Skin: Negative for color change and rash.   Neurological: Positive for weakness (diffuse) and light-headedness. Negative for dizziness, numbness and headaches.   Hematological: Does not bruise/bleed easily.   Psychiatric/Behavioral: Negative for confusion. The patient is not nervous/anxious.        Physical Exam     ED Triage Vitals [02/24/18 1140]   ED Triage Vitals Group      Temp 98.4 °F (36.9 °C)      Pulse 85      Resp 16      /81      SpO2 98 %      EtCO2 mmHg       Height 5' 2\" (1.575 m)      Weight 140 lb (63.5 kg)      Weight Scale Used ED Stated       Physical Exam   Constitutional: She is oriented to person, place, and time. Vital signs are normal. She appears well-developed and well-nourished.  Non-toxic appearance. She does not have a sickly appearance.   HENT:   Head: Normocephalic and atraumatic.   Mouth/Throat: No oropharyngeal  exudate.   Eyes: Conjunctivae and EOM are normal. Pupils are equal, round, and reactive to light.   Neck: Normal range of motion. Neck supple.   Cardiovascular: Normal rate, regular rhythm and intact distal pulses.    Murmur heard.   Systolic murmur is present   Pulmonary/Chest: Breath sounds normal. No respiratory distress.   Pacemaker in the left upper chest wall   Abdominal: Soft. Bowel sounds are normal.   Musculoskeletal: Normal range of motion.        Right knee: She exhibits swelling and ecchymosis.   Previous right knee surgical incisions appear clean, dry, and intact   Neurological: She is alert and oriented to person, place, and time. GCS eye subscore is 4. GCS verbal subscore is 5. GCS motor subscore is 6.   Skin: Skin is warm and dry. No rash noted.   Psychiatric: She has a normal mood and affect. Her behavior is normal.   Nursing note and vitals reviewed.      ED Course     Procedures    Lab Results     Results for orders placed or performed during the hospital encounter of 02/24/18   CBC & Auto Differential   Result Value Ref Range    WBC 10.1 4.2 - 11.0 K/mcL    RBC 3.24 (L) 4.00 - 5.20 mil/mcL    HGB 9.4 (L) 12.0 - 15.5 g/dL    HCT 29.6 (L) 36.0 - 46.5 %    MCV 91.4 78.0 - 100.0 fl    MCH 29.0 26.0 - 34.0 pg    MCHC 31.8 (L) 32.0 - 36.5 g/dL    RDW-CV 15.6 (H) 11.0 - 15.0 %     140 - 450 K/mcL    DIFF TYPE AUTOMATED DIFFERENTIAL     Neutrophil 80 %    LYMPH 13 %    MONO 7 %    EOSIN 0 %    BASO 0 %    Absolute Neutrophil 8.0 (H) 1.8 - 7.7 K/mcL    Absolute Lymph 1.3 1.0 - 4.0 K/mcL    Absolute Mono 0.7 0.3 - 0.9 K/mcL    Absolute Eos 0.0 (L) 0.1 - 0.5 K/mcL    Absolute Baso 0.0 0.0 - 0.3 K/mcL   Comprehensive Metabolic Panel   Result Value Ref Range    Sodium 136 135 - 145 mmol/L    Potassium 3.0 (L) 3.4 - 5.1 mmol/L    Chloride 97 (L) 98 - 107 mmol/L    Carbon Dioxide 29 21 - 32 mmol/L    Anion Gap 13 10 - 20 mmol/L    Glucose 105 (H) 65 - 99 mg/dL    BUN 22 (H) 6 - 20 mg/dL    Creatinine  0.93 0.51 - 0.95 mg/dL    GFR Estimate,  69     GFR Estimate, Non African American 59     BUN/Creatinine Ratio 24 7 - 25    CALCIUM 9.4 8.4 - 10.2 mg/dL    TOTAL BILIRUBIN 0.9 0.2 - 1.0 mg/dL    AST/SGOT 22 <38 Units/L    ALT/SGPT 18 <79 Units/L    ALK PHOSPHATASE 78 45 - 117 Units/L    TOTAL PROTEIN 7.8 6.4 - 8.2 g/dL    Albumin 2.8 (L) 3.6 - 5.1 g/dL    GLOBULIN 5.0 (H) 2.0 - 4.0 g/dL    A/G Ratio, Serum 0.6 (L) 1.0 - 2.4   Lipase Level   Result Value Ref Range    Lipase 547 (H) 73 - 393 Units/L   B Type Natriuretic Peptide BNP   Result Value Ref Range    B-TYPE NATRIURETIC PEPTIDE 692 (H) <100 pg/mL   Chem 8 Panel - Point of Care   Result Value Ref Range    Sodium  135 - 145 mmol/L    Potassium POC 3.0 (L) 3.4 - 5.1 mmol/L    Chloride POC 96 (L) 98 - 107 mmol/L    CALCIUM IONIZED-POC 1.09 (L) 1.15 - 1.29 mmol/L    CO2 Total 28 (H) 19 - 24 mmol/L    GLUCOSE  (H) 65 - 99 mg/dL    BUN POC 22 (H) 6 - 20 mg/dL    HEMATOCRIT POC 31.0 (L) 36.0 - 46.5 %    Hemoglobin POC 10.5 (L) 12.0 - 15.5 g/dL    ANION GAP POC 16 mmol/L    Creatinine POC 0.90 0.51 - 0.95 mg/dL    Estimated GFR  (POC) 71     Estimated GFR Non- (POC) 62    Lactic Acid Venous - Point of Care   Result Value Ref Range    Lactic Acid Venous 1.5 <2.0 mmol/L   Troponin I - Point of Care   Result Value Ref Range    Troponin I POC <0.10 <0.10 ng/mL       EKG Results     EKG Interpretation  Rate: 85  Rhythm: normal sinus rhythm   Abnormality: LVH with repolarization abnormality  When compared to the EKG from February 2018: no significant change     EKG interpreted by ED physician    Radiology Results     Imaging Results          XR Chest AP or PA (Final result)  Result time 02/24/18 12:18:36    Final result                 Impression:    IMPRESSION:    1. No acute cardiopulmonary process.    2. Stable left arm PICC line. Interval removal of the right IJ central  line.               Narrative:    CHEST  X RAY, AP PORTABLE    INDICATION: Weakness and dizziness.    COMPARISON: Previous including 2/15/2018.    FINDINGS:    The left PICC line and left pacemaker/defibrillator remain in place. The  right IJ central line has been removed in the interval.    The lungs and pleural spaces are clear without interval focal consolidation  or effusion. The pulmonary vasculature is within normal limits.    The heart size is stable.    The mediastinum is stable.    Subcutaneous soft tissues and osseous structures are stable.                                ED Medication Orders     Start Ordered     Status Ordering Provider    02/24/18 1247 02/24/18 1246  potassium chloride (K-DUR,KLOR-CON) CR tablet 20 mEq  ONCE      Last MAR action:  Given NINA DOBBINS    02/24/18 1206 02/24/18 1206  SODIUM CHLORIDE 0.9 % IV SOLN Iqra Override     Comments:  Lety Ovalles: cabinet override    Ordered     02/24/18 1159 02/24/18 1158  sodium chloride (NORMAL SALINE) 0.9 % bolus 500 mL  ONCE      Last MAR action:  Completed NINA DOBBINS    02/24/18 1143 02/24/18 1142  sodium chloride (PF) 0.9 % injection 2 mL  (Capped IV)  ONCE      Acknowledged NINA DOBBINS    02/24/18 1141 02/24/18 1142  sodium chloride (PF) 0.9 % injection 2 mL  (Capped IV)  PRN      Acknowledged NINA DOBBINS          [facility]  Vitals  Vitals:    02/24/18 1154 02/24/18 1207 02/24/18 1227 02/24/18 1247   BP: 143/64 134/62 136/63 147/67   Pulse: 84 82 90 88   Resp:  16 23 14   Temp:       TempSrc:       SpO2: 95% 100% 97% 98%   Weight:       Height:           ED Course  11:41 AM Initial Impression: The pt is a 78 year old female who presents to the ED today with diffuse weakness that began today.  Labs will be ordered to assess for infection, anemia, electrolyte imbalance, acute kidney injury, fluid retention, gall bladder/liver disease, or any other abnormalities. Lactate and blood cultures will be ordered to evaluate for severe sepsis. Imaging will be ordered to assess  for any acute respiratory disease. EKG/Troponin will be ordered to assess for ACS. UA will be ordered to assess for UTI. The patient indicates understanding of these issues and agrees with the plan.       12:35 PM I rechecked on the patient who is resting comfortably. Pt reports improvement in her sx and she does feel comfortable going home. I updated the patient on her unremarkable labs and imaging so far. Will continue to monitor.      12:48 PM I spoke with Dr. Sully Nevarez, ID, regarding the patient's presentation in the ED and the ED work up. We discussed her recent diagnosis of septic arthritis on outpatient antibiotics and I made him aware of her unremarkable lab workup so far. He recommends probiotics and outpatient follow up.     1:18 PM I rechecked on the patient who is resting comfortably. I updated the patient on her lab results showing elevation in her lipase but no other abnormalities. Pt denies nausea, vomiting, or abd pain and I advised her that lipase elevation is likely related to diarrhea. I offered the pt an US of the RUQ to evaluate for cholecystitis however she declined it. I feel that this is reasonable and I encouraged the pt to follow up with her PCP. I also encouraged her to start a probiotic and continue her antibiotic regimen as set by ID. Pt was also instructed to return for worsening sx. The patient indicates understanding of these issues and agrees with the plan.     MDM    Pt presents to the ED today from IV infusion with generalized weakness and diarrhea. Significant PMHx for sepsis related to unknown source. Vitals stable. Normal lactate. HGB is 9.4 today which is up from previous of 8.2. No sx of GI bleed. She received IV fluids and potassium supplementation with improvement in sx. Did discuss recommendations for close outpatient follow up. Slight elevation of lipase likely due to diarrhea. Will hold on abx for C diff and start probiotics per ID. She will return for infusion tomorrow  and sooner if worse.     Critical Care time spent on this patient outside of billable procedures:  None    Discharge  Clinical Impression  ED Diagnoses        Final diagnoses    Weakness generalized     Hypokalemia           The patient was provided with a recommendation to follow up with a primary care provider and obtain reassessment of his/her blood pressure within three months    Follow Up:  Anne Rousseau MD  [address]  [place] [address]  [phone]    In 1 week         Pt is discharged to home/self care in stable condition.      I have reviewed the information recorded by the scribe for accuracy and agree with its contents.    ____________________________________________________________________    Fan Valles acting as a scribe for Dr. Kai Nixon  Dictation #: [id number]  Scribe: Fan Nixon,   02/24/18 1328

## 2020-10-02 ENCOUNTER — TELEPHONE (OUTPATIENT)
Dept: OBGYN CLINIC | Facility: HOSPITAL | Age: 70
End: 2020-10-02

## 2020-10-08 ENCOUNTER — HOSPITAL ENCOUNTER (OUTPATIENT)
Dept: RADIOLOGY | Age: 70
Discharge: HOME | End: 2020-10-08
Payer: MEDICARE

## 2020-10-08 DIAGNOSIS — M43.16 SPONDYLOLISTHESIS AT L4-L5 LEVEL: ICD-10-CM

## 2020-10-08 DIAGNOSIS — M54.40 ACUTE MIDLINE LOW BACK PAIN WITH SCIATICA, SCIATICA LATERALITY UNSPECIFIED: ICD-10-CM

## 2020-10-08 DIAGNOSIS — M51.36 DDD (DEGENERATIVE DISC DISEASE), LUMBAR: ICD-10-CM

## 2020-10-08 PROCEDURE — 72148 MRI LUMBAR SPINE W/O DYE: CPT

## 2020-10-08 PROCEDURE — G1004 CDSM NDSC: HCPCS

## 2020-10-12 ENCOUNTER — OFFICE VISIT (OUTPATIENT)
Dept: OBGYN CLINIC | Facility: HOSPITAL | Age: 70
End: 2020-10-12
Payer: MEDICARE

## 2020-10-12 VITALS
SYSTOLIC BLOOD PRESSURE: 117 MMHG | BODY MASS INDEX: 32.12 KG/M2 | HEART RATE: 69 BPM | DIASTOLIC BLOOD PRESSURE: 73 MMHG | WEIGHT: 153 LBS | HEIGHT: 58 IN

## 2020-10-12 DIAGNOSIS — M51.36 DDD (DEGENERATIVE DISC DISEASE), LUMBAR: ICD-10-CM

## 2020-10-12 DIAGNOSIS — M43.16 SPONDYLOLISTHESIS AT L4-L5 LEVEL: Primary | ICD-10-CM

## 2020-10-12 PROCEDURE — 99213 OFFICE O/P EST LOW 20 MIN: CPT | Performed by: ORTHOPAEDIC SURGERY

## 2020-10-13 ENCOUNTER — TELEPHONE (OUTPATIENT)
Dept: RADIOLOGY | Facility: CLINIC | Age: 70
End: 2020-10-13

## 2020-10-14 ENCOUNTER — TELEPHONE (OUTPATIENT)
Dept: RADIOLOGY | Facility: CLINIC | Age: 70
End: 2020-10-14

## 2020-10-19 ENCOUNTER — TRANSCRIBE ORDERS (OUTPATIENT)
Dept: RADIOLOGY | Facility: CLINIC | Age: 70
End: 2020-10-19

## 2020-10-27 ENCOUNTER — TRANSCRIBE ORDERS (OUTPATIENT)
Dept: LAB | Facility: CLINIC | Age: 70
End: 2020-10-27

## 2020-10-27 ENCOUNTER — LAB (OUTPATIENT)
Dept: LAB | Facility: CLINIC | Age: 70
End: 2020-10-27
Payer: MEDICARE

## 2020-10-27 DIAGNOSIS — E03.9 MYXEDEMA HEART DISEASE: ICD-10-CM

## 2020-10-27 DIAGNOSIS — I51.9 MYXEDEMA HEART DISEASE: ICD-10-CM

## 2020-10-27 DIAGNOSIS — E27.9 ADRENAL HYPERTENSION (HCC): ICD-10-CM

## 2020-10-27 DIAGNOSIS — E66.8 OBESITY OF ENDOCRINE ORIGIN: ICD-10-CM

## 2020-10-27 DIAGNOSIS — E03.9 MYXEDEMA HEART DISEASE: Primary | ICD-10-CM

## 2020-10-27 DIAGNOSIS — E11.9 DIABETES MELLITUS WITHOUT COMPLICATION (HCC): ICD-10-CM

## 2020-10-27 DIAGNOSIS — I15.2 ADRENAL HYPERTENSION (HCC): ICD-10-CM

## 2020-10-27 DIAGNOSIS — I51.9 MYXEDEMA HEART DISEASE: Primary | ICD-10-CM

## 2020-10-27 LAB
ALBUMIN SERPL BCP-MCNC: 3.2 G/DL (ref 3.5–5)
ALP SERPL-CCNC: 47 U/L (ref 46–116)
ALT SERPL W P-5'-P-CCNC: 18 U/L (ref 12–78)
ANION GAP SERPL CALCULATED.3IONS-SCNC: 10 MMOL/L (ref 4–13)
AST SERPL W P-5'-P-CCNC: 10 U/L (ref 5–45)
BACTERIA UR QL AUTO: ABNORMAL /HPF
BASOPHILS # BLD AUTO: 0.05 THOUSANDS/ΜL (ref 0–0.1)
BASOPHILS NFR BLD AUTO: 1 % (ref 0–1)
BILIRUB SERPL-MCNC: 0.4 MG/DL (ref 0.2–1)
BILIRUB UR QL STRIP: NEGATIVE
BUN SERPL-MCNC: 23 MG/DL (ref 5–25)
CALCIUM ALBUM COR SERPL-MCNC: 9.2 MG/DL (ref 8.3–10.1)
CALCIUM SERPL-MCNC: 8.6 MG/DL (ref 8.3–10.1)
CHLORIDE SERPL-SCNC: 105 MMOL/L (ref 100–108)
CLARITY UR: CLEAR
CO2 SERPL-SCNC: 26 MMOL/L (ref 21–32)
COLOR UR: YELLOW
CREAT SERPL-MCNC: 1.03 MG/DL (ref 0.6–1.3)
CREAT UR-MCNC: 105 MG/DL
EOSINOPHIL # BLD AUTO: 0.52 THOUSAND/ΜL (ref 0–0.61)
EOSINOPHIL NFR BLD AUTO: 8 % (ref 0–6)
ERYTHROCYTE [DISTWIDTH] IN BLOOD BY AUTOMATED COUNT: 13.4 % (ref 11.6–15.1)
EST. AVERAGE GLUCOSE BLD GHB EST-MCNC: 180 MG/DL
GFR SERPL CREATININE-BSD FRML MDRD: 56 ML/MIN/1.73SQ M
GLUCOSE P FAST SERPL-MCNC: 176 MG/DL (ref 65–99)
GLUCOSE UR STRIP-MCNC: NEGATIVE MG/DL
HBA1C MFR BLD: 7.9 %
HCT VFR BLD AUTO: 37 % (ref 34.8–46.1)
HGB BLD-MCNC: 12.4 G/DL (ref 11.5–15.4)
HGB UR QL STRIP.AUTO: ABNORMAL
IMM GRANULOCYTES # BLD AUTO: 0.02 THOUSAND/UL (ref 0–0.2)
IMM GRANULOCYTES NFR BLD AUTO: 0 % (ref 0–2)
KETONES UR STRIP-MCNC: NEGATIVE MG/DL
LEUKOCYTE ESTERASE UR QL STRIP: ABNORMAL
LYMPHOCYTES # BLD AUTO: 1.41 THOUSANDS/ΜL (ref 0.6–4.47)
LYMPHOCYTES NFR BLD AUTO: 22 % (ref 14–44)
MAGNESIUM SERPL-MCNC: 1.4 MG/DL (ref 1.6–2.6)
MCH RBC QN AUTO: 31.3 PG (ref 26.8–34.3)
MCHC RBC AUTO-ENTMCNC: 33.5 G/DL (ref 31.4–37.4)
MCV RBC AUTO: 93 FL (ref 82–98)
MICROALBUMIN UR-MCNC: 643 MG/L (ref 0–20)
MICROALBUMIN/CREAT 24H UR: 612 MG/G CREATININE (ref 0–30)
MONOCYTES # BLD AUTO: 0.64 THOUSAND/ΜL (ref 0.17–1.22)
MONOCYTES NFR BLD AUTO: 10 % (ref 4–12)
NEUTROPHILS # BLD AUTO: 3.87 THOUSANDS/ΜL (ref 1.85–7.62)
NEUTS SEG NFR BLD AUTO: 59 % (ref 43–75)
NITRITE UR QL STRIP: NEGATIVE
NON-SQ EPI CELLS URNS QL MICRO: ABNORMAL /HPF
NRBC BLD AUTO-RTO: 0 /100 WBCS
PH UR STRIP.AUTO: 5.5 [PH]
PHOSPHATE SERPL-MCNC: 2.9 MG/DL (ref 2.3–4.1)
PLATELET # BLD AUTO: 272 THOUSANDS/UL (ref 149–390)
PMV BLD AUTO: 11 FL (ref 8.9–12.7)
POTASSIUM SERPL-SCNC: 4.1 MMOL/L (ref 3.5–5.3)
PROT SERPL-MCNC: 6.7 G/DL (ref 6.4–8.2)
PROT UR STRIP-MCNC: ABNORMAL MG/DL
RBC # BLD AUTO: 3.96 MILLION/UL (ref 3.81–5.12)
RBC #/AREA URNS AUTO: ABNORMAL /HPF
SODIUM SERPL-SCNC: 141 MMOL/L (ref 136–145)
SP GR UR STRIP.AUTO: 1.02 (ref 1–1.03)
T4 FREE SERPL-MCNC: 1.43 NG/DL (ref 0.76–1.46)
TSH SERPL DL<=0.05 MIU/L-ACNC: 1.1 UIU/ML (ref 0.36–3.74)
UROBILINOGEN UR QL STRIP.AUTO: 0.2 E.U./DL
WBC # BLD AUTO: 6.51 THOUSAND/UL (ref 4.31–10.16)
WBC #/AREA URNS AUTO: ABNORMAL /HPF

## 2020-10-27 PROCEDURE — 83735 ASSAY OF MAGNESIUM: CPT

## 2020-10-27 PROCEDURE — 81001 URINALYSIS AUTO W/SCOPE: CPT | Performed by: SPECIALIST

## 2020-10-27 PROCEDURE — 82024 ASSAY OF ACTH: CPT

## 2020-10-27 PROCEDURE — 83036 HEMOGLOBIN GLYCOSYLATED A1C: CPT

## 2020-10-27 PROCEDURE — 84100 ASSAY OF PHOSPHORUS: CPT

## 2020-10-27 PROCEDURE — 83835 ASSAY OF METANEPHRINES: CPT

## 2020-10-27 PROCEDURE — 80053 COMPREHEN METABOLIC PANEL: CPT

## 2020-10-27 PROCEDURE — 82043 UR ALBUMIN QUANTITATIVE: CPT | Performed by: SPECIALIST

## 2020-10-27 PROCEDURE — 84439 ASSAY OF FREE THYROXINE: CPT

## 2020-10-27 PROCEDURE — 84443 ASSAY THYROID STIM HORMONE: CPT

## 2020-10-27 PROCEDURE — 85025 COMPLETE CBC W/AUTO DIFF WBC: CPT

## 2020-10-27 PROCEDURE — 82570 ASSAY OF URINE CREATININE: CPT | Performed by: SPECIALIST

## 2020-10-27 PROCEDURE — 36415 COLL VENOUS BLD VENIPUNCTURE: CPT

## 2020-10-28 LAB — ACTH PLAS-MCNC: 20 PG/ML (ref 7.2–63.3)

## 2020-11-02 LAB
METANEPH FREE SERPL-MCNC: 21.8 PG/ML (ref 0–88)
NORMETANEPHRINE SERPL-MCNC: 106.7 PG/ML (ref 0–191.8)

## 2020-11-04 ENCOUNTER — HOSPITAL ENCOUNTER (OUTPATIENT)
Dept: RADIOLOGY | Facility: CLINIC | Age: 70
Discharge: HOME | End: 2020-11-04
Attending: PHYSICAL MEDICINE & REHABILITATION
Payer: MEDICARE

## 2020-11-04 VITALS
SYSTOLIC BLOOD PRESSURE: 164 MMHG | HEART RATE: 74 BPM | RESPIRATION RATE: 18 BRPM | DIASTOLIC BLOOD PRESSURE: 83 MMHG | OXYGEN SATURATION: 95 % | TEMPERATURE: 98.2 F

## 2020-11-04 DIAGNOSIS — M54.16 LUMBAR RADICULOPATHY: ICD-10-CM

## 2020-11-04 PROCEDURE — 64483 NJX AA&/STRD TFRM EPI L/S 1: CPT | Performed by: PHYSICAL MEDICINE & REHABILITATION

## 2020-11-04 PROCEDURE — 64484 NJX AA&/STRD TFRM EPI L/S EA: CPT | Performed by: PHYSICAL MEDICINE & REHABILITATION

## 2020-11-04 RX ORDER — BUPIVACAINE HCL/PF 2.5 MG/ML
10 VIAL (ML) INJECTION ONCE
Status: COMPLETED | OUTPATIENT
Start: 2020-11-04 | End: 2020-11-04

## 2020-11-04 RX ORDER — 0.9 % SODIUM CHLORIDE 0.9 %
10 VIAL (ML) INJECTION ONCE
Status: COMPLETED | OUTPATIENT
Start: 2020-11-04 | End: 2020-11-04

## 2020-11-04 RX ORDER — PAPAVERINE HCL 150 MG
20 CAPSULE, EXTENDED RELEASE ORAL ONCE
Status: COMPLETED | OUTPATIENT
Start: 2020-11-04 | End: 2020-11-04

## 2020-11-04 RX ADMIN — BUPIVACAINE HYDROCHLORIDE 1 ML: 2.5 INJECTION, SOLUTION EPIDURAL; INFILTRATION; INTRACAUDAL at 10:12

## 2020-11-04 RX ADMIN — IOHEXOL 1 ML: 300 INJECTION, SOLUTION INTRAVENOUS at 10:10

## 2020-11-04 RX ADMIN — Medication 2 ML: at 10:08

## 2020-11-04 RX ADMIN — DEXAMETHASONE SODIUM PHOSPHATE 20 MG: 10 INJECTION, SOLUTION INTRAMUSCULAR; INTRAVENOUS at 10:12

## 2020-11-04 RX ADMIN — SODIUM CHLORIDE 2 ML: 9 INJECTION, SOLUTION INTRAMUSCULAR; INTRAVENOUS; SUBCUTANEOUS at 10:08

## 2020-11-11 ENCOUNTER — TELEPHONE (OUTPATIENT)
Dept: PAIN MEDICINE | Facility: CLINIC | Age: 70
End: 2020-11-11

## 2021-03-11 ENCOUNTER — IMMUNIZATIONS (OUTPATIENT)
Dept: FAMILY MEDICINE CLINIC | Facility: HOSPITAL | Age: 71
End: 2021-03-11

## 2021-03-11 DIAGNOSIS — Z23 ENCOUNTER FOR IMMUNIZATION: Primary | ICD-10-CM

## 2021-03-11 PROCEDURE — 91300 SARS-COV-2 / COVID-19 MRNA VACCINE (PFIZER-BIONTECH) 30 MCG: CPT

## 2021-03-11 PROCEDURE — 0001A SARS-COV-2 / COVID-19 MRNA VACCINE (PFIZER-BIONTECH) 30 MCG: CPT

## 2021-04-01 ENCOUNTER — IMMUNIZATIONS (OUTPATIENT)
Dept: FAMILY MEDICINE CLINIC | Facility: HOSPITAL | Age: 71
End: 2021-04-01

## 2021-04-01 DIAGNOSIS — Z23 ENCOUNTER FOR IMMUNIZATION: Primary | ICD-10-CM

## 2021-04-01 PROCEDURE — 0002A SARS-COV-2 / COVID-19 MRNA VACCINE (PFIZER-BIONTECH) 30 MCG: CPT

## 2021-04-01 PROCEDURE — 91300 SARS-COV-2 / COVID-19 MRNA VACCINE (PFIZER-BIONTECH) 30 MCG: CPT

## 2021-07-02 ENCOUNTER — APPOINTMENT (OUTPATIENT)
Dept: LAB | Facility: CLINIC | Age: 71
End: 2021-07-02
Payer: MEDICARE

## 2021-07-02 DIAGNOSIS — E27.5 MEDULLOADRENAL HYPERFUNCTION (HCC): ICD-10-CM

## 2021-07-02 DIAGNOSIS — I10 ESSENTIAL HYPERTENSION, MALIGNANT: ICD-10-CM

## 2021-07-02 DIAGNOSIS — E11.65 UNCONTROLLED TYPE 2 DIABETES MELLITUS WITH HYPERGLYCEMIA (HCC): ICD-10-CM

## 2021-07-02 DIAGNOSIS — E66.8 OBESITY OF ENDOCRINE ORIGIN: ICD-10-CM

## 2021-07-02 LAB
ALBUMIN SERPL BCP-MCNC: 3.4 G/DL (ref 3.5–5)
ALP SERPL-CCNC: 50 U/L (ref 46–116)
ALT SERPL W P-5'-P-CCNC: 13 U/L (ref 12–78)
ANION GAP SERPL CALCULATED.3IONS-SCNC: 11 MMOL/L (ref 4–13)
AST SERPL W P-5'-P-CCNC: 14 U/L (ref 5–45)
BASOPHILS # BLD AUTO: 0.05 THOUSANDS/ΜL (ref 0–0.1)
BASOPHILS NFR BLD AUTO: 1 % (ref 0–1)
BILIRUB SERPL-MCNC: 0.68 MG/DL (ref 0.2–1)
BUN SERPL-MCNC: 28 MG/DL (ref 5–25)
CALCIUM ALBUM COR SERPL-MCNC: 9.3 MG/DL (ref 8.3–10.1)
CALCIUM SERPL-MCNC: 8.8 MG/DL (ref 8.3–10.1)
CHLORIDE SERPL-SCNC: 107 MMOL/L (ref 100–108)
CO2 SERPL-SCNC: 25 MMOL/L (ref 21–32)
CREAT SERPL-MCNC: 1.03 MG/DL (ref 0.6–1.3)
EOSINOPHIL # BLD AUTO: 0.62 THOUSAND/ΜL (ref 0–0.61)
EOSINOPHIL NFR BLD AUTO: 9 % (ref 0–6)
ERYTHROCYTE [DISTWIDTH] IN BLOOD BY AUTOMATED COUNT: 13.1 % (ref 11.6–15.1)
GFR SERPL CREATININE-BSD FRML MDRD: 55 ML/MIN/1.73SQ M
GLUCOSE P FAST SERPL-MCNC: 105 MG/DL (ref 65–99)
HCT VFR BLD AUTO: 36 % (ref 34.8–46.1)
HGB BLD-MCNC: 12 G/DL (ref 11.5–15.4)
IMM GRANULOCYTES # BLD AUTO: 0.02 THOUSAND/UL (ref 0–0.2)
IMM GRANULOCYTES NFR BLD AUTO: 0 % (ref 0–2)
LYMPHOCYTES # BLD AUTO: 1.43 THOUSANDS/ΜL (ref 0.6–4.47)
LYMPHOCYTES NFR BLD AUTO: 20 % (ref 14–44)
MAGNESIUM SERPL-MCNC: 1.6 MG/DL (ref 1.6–2.6)
MCH RBC QN AUTO: 30.2 PG (ref 26.8–34.3)
MCHC RBC AUTO-ENTMCNC: 33.3 G/DL (ref 31.4–37.4)
MCV RBC AUTO: 91 FL (ref 82–98)
MONOCYTES # BLD AUTO: 0.82 THOUSAND/ΜL (ref 0.17–1.22)
MONOCYTES NFR BLD AUTO: 12 % (ref 4–12)
NEUTROPHILS # BLD AUTO: 4.12 THOUSANDS/ΜL (ref 1.85–7.62)
NEUTS SEG NFR BLD AUTO: 58 % (ref 43–75)
NRBC BLD AUTO-RTO: 0 /100 WBCS
PHOSPHATE SERPL-MCNC: 4.2 MG/DL (ref 2.3–4.1)
PLATELET # BLD AUTO: 276 THOUSANDS/UL (ref 149–390)
PMV BLD AUTO: 11.1 FL (ref 8.9–12.7)
POTASSIUM SERPL-SCNC: 3.8 MMOL/L (ref 3.5–5.3)
PROT SERPL-MCNC: 7 G/DL (ref 6.4–8.2)
RBC # BLD AUTO: 3.98 MILLION/UL (ref 3.81–5.12)
SODIUM SERPL-SCNC: 143 MMOL/L (ref 136–145)
T4 FREE SERPL-MCNC: 1.51 NG/DL (ref 0.76–1.46)
TSH SERPL DL<=0.05 MIU/L-ACNC: 1.02 UIU/ML (ref 0.36–3.74)
WBC # BLD AUTO: 7.06 THOUSAND/UL (ref 4.31–10.16)

## 2021-07-02 PROCEDURE — 84443 ASSAY THYROID STIM HORMONE: CPT

## 2021-07-02 PROCEDURE — 83835 ASSAY OF METANEPHRINES: CPT

## 2021-07-02 PROCEDURE — 80053 COMPREHEN METABOLIC PANEL: CPT

## 2021-07-02 PROCEDURE — 83735 ASSAY OF MAGNESIUM: CPT

## 2021-07-02 PROCEDURE — 83036 HEMOGLOBIN GLYCOSYLATED A1C: CPT

## 2021-07-02 PROCEDURE — 86316 IMMUNOASSAY TUMOR OTHER: CPT

## 2021-07-02 PROCEDURE — 85025 COMPLETE CBC W/AUTO DIFF WBC: CPT

## 2021-07-02 PROCEDURE — 84100 ASSAY OF PHOSPHORUS: CPT

## 2021-07-02 PROCEDURE — 82024 ASSAY OF ACTH: CPT

## 2021-07-02 PROCEDURE — 36415 COLL VENOUS BLD VENIPUNCTURE: CPT

## 2021-07-02 PROCEDURE — 84439 ASSAY OF FREE THYROXINE: CPT

## 2021-07-03 LAB
ACTH PLAS-MCNC: 19.2 PG/ML (ref 7.2–63.3)
EST. AVERAGE GLUCOSE BLD GHB EST-MCNC: 169 MG/DL
HBA1C MFR BLD: 7.5 %

## 2021-07-06 LAB — CGA SERPL-MCNC: 269.8 NG/ML (ref 0–101.8)

## 2021-07-09 LAB
METANEPH FREE SERPL-MCNC: 17.9 PG/ML (ref 0–88)
NORMETANEPHRINE SERPL-MCNC: 124.6 PG/ML (ref 0–191.8)

## 2021-10-08 ENCOUNTER — APPOINTMENT (OUTPATIENT)
Dept: LAB | Facility: CLINIC | Age: 71
End: 2021-10-08
Payer: MEDICARE

## 2021-10-08 DIAGNOSIS — I10 ESSENTIAL HYPERTENSION, MALIGNANT: ICD-10-CM

## 2021-10-08 DIAGNOSIS — E11.65 UNCONTROLLED TYPE 2 DIABETES MELLITUS WITH HYPERGLYCEMIA (HCC): ICD-10-CM

## 2021-10-08 DIAGNOSIS — E11.69 TYPE 2 DIABETES MELLITUS WITH OTHER SPECIFIED COMPLICATION, UNSPECIFIED WHETHER LONG TERM INSULIN USE (HCC): ICD-10-CM

## 2021-10-08 DIAGNOSIS — E03.9 HYPOTHYROIDISM, ADULT: ICD-10-CM

## 2021-10-08 DIAGNOSIS — E66.8 OBESITY OF ENDOCRINE ORIGIN: ICD-10-CM

## 2021-10-08 DIAGNOSIS — E27.5 MEDULLOADRENAL HYPERFUNCTION (HCC): ICD-10-CM

## 2021-10-08 LAB
ALBUMIN SERPL BCP-MCNC: 3.6 G/DL (ref 3.5–5)
ALP SERPL-CCNC: 73 U/L (ref 46–116)
ALT SERPL W P-5'-P-CCNC: 19 U/L (ref 12–78)
ANION GAP SERPL CALCULATED.3IONS-SCNC: 6 MMOL/L (ref 4–13)
AST SERPL W P-5'-P-CCNC: 14 U/L (ref 5–45)
BASOPHILS # BLD AUTO: 0.04 THOUSANDS/ΜL (ref 0–0.1)
BASOPHILS NFR BLD AUTO: 1 % (ref 0–1)
BILIRUB SERPL-MCNC: 0.57 MG/DL (ref 0.2–1)
BUN SERPL-MCNC: 35 MG/DL (ref 5–25)
CALCIUM SERPL-MCNC: 8.6 MG/DL (ref 8.3–10.1)
CHLORIDE SERPL-SCNC: 102 MMOL/L (ref 100–108)
CO2 SERPL-SCNC: 28 MMOL/L (ref 21–32)
CORTIS AM PEAK SERPL-MCNC: 20.8 UG/DL (ref 4.2–22.4)
CREAT SERPL-MCNC: 1.27 MG/DL (ref 0.6–1.3)
EOSINOPHIL # BLD AUTO: 0.38 THOUSAND/ΜL (ref 0–0.61)
EOSINOPHIL NFR BLD AUTO: 7 % (ref 0–6)
ERYTHROCYTE [DISTWIDTH] IN BLOOD BY AUTOMATED COUNT: 12.7 % (ref 11.6–15.1)
EST. AVERAGE GLUCOSE BLD GHB EST-MCNC: 232 MG/DL
GFR SERPL CREATININE-BSD FRML MDRD: 43 ML/MIN/1.73SQ M
GLUCOSE P FAST SERPL-MCNC: 270 MG/DL (ref 65–99)
HBA1C MFR BLD: 9.7 %
HCT VFR BLD AUTO: 35.8 % (ref 34.8–46.1)
HGB BLD-MCNC: 12.2 G/DL (ref 11.5–15.4)
IMM GRANULOCYTES # BLD AUTO: 0.02 THOUSAND/UL (ref 0–0.2)
IMM GRANULOCYTES NFR BLD AUTO: 0 % (ref 0–2)
LYMPHOCYTES # BLD AUTO: 1.79 THOUSANDS/ΜL (ref 0.6–4.47)
LYMPHOCYTES NFR BLD AUTO: 31 % (ref 14–44)
MAGNESIUM SERPL-MCNC: 2 MG/DL (ref 1.6–2.6)
MCH RBC QN AUTO: 30.3 PG (ref 26.8–34.3)
MCHC RBC AUTO-ENTMCNC: 34.1 G/DL (ref 31.4–37.4)
MCV RBC AUTO: 89 FL (ref 82–98)
MONOCYTES # BLD AUTO: 0.83 THOUSAND/ΜL (ref 0.17–1.22)
MONOCYTES NFR BLD AUTO: 15 % (ref 4–12)
NEUTROPHILS # BLD AUTO: 2.67 THOUSANDS/ΜL (ref 1.85–7.62)
NEUTS SEG NFR BLD AUTO: 46 % (ref 43–75)
NRBC BLD AUTO-RTO: 0 /100 WBCS
PHOSPHATE SERPL-MCNC: 3.5 MG/DL (ref 2.3–4.1)
PLATELET # BLD AUTO: 281 THOUSANDS/UL (ref 149–390)
PMV BLD AUTO: 11.1 FL (ref 8.9–12.7)
POTASSIUM SERPL-SCNC: 4.1 MMOL/L (ref 3.5–5.3)
PROT SERPL-MCNC: 6.9 G/DL (ref 6.4–8.2)
RBC # BLD AUTO: 4.03 MILLION/UL (ref 3.81–5.12)
SODIUM SERPL-SCNC: 136 MMOL/L (ref 136–145)
T4 FREE SERPL-MCNC: 1.15 NG/DL (ref 0.76–1.46)
TSH SERPL DL<=0.05 MIU/L-ACNC: 5.24 UIU/ML (ref 0.36–3.74)
WBC # BLD AUTO: 5.73 THOUSAND/UL (ref 4.31–10.16)

## 2021-10-08 PROCEDURE — 83735 ASSAY OF MAGNESIUM: CPT

## 2021-10-08 PROCEDURE — 83036 HEMOGLOBIN GLYCOSYLATED A1C: CPT

## 2021-10-08 PROCEDURE — 83835 ASSAY OF METANEPHRINES: CPT

## 2021-10-08 PROCEDURE — 82024 ASSAY OF ACTH: CPT

## 2021-10-08 PROCEDURE — 84443 ASSAY THYROID STIM HORMONE: CPT

## 2021-10-08 PROCEDURE — 84100 ASSAY OF PHOSPHORUS: CPT

## 2021-10-08 PROCEDURE — 86316 IMMUNOASSAY TUMOR OTHER: CPT

## 2021-10-08 PROCEDURE — 85025 COMPLETE CBC W/AUTO DIFF WBC: CPT

## 2021-10-08 PROCEDURE — 36415 COLL VENOUS BLD VENIPUNCTURE: CPT

## 2021-10-08 PROCEDURE — 82533 TOTAL CORTISOL: CPT

## 2021-10-08 PROCEDURE — 84439 ASSAY OF FREE THYROXINE: CPT

## 2021-10-08 PROCEDURE — 80053 COMPREHEN METABOLIC PANEL: CPT

## 2021-10-09 LAB — ACTH PLAS-MCNC: 25.1 PG/ML (ref 7.2–63.3)

## 2021-10-11 LAB — CGA SERPL-MCNC: 203.9 NG/ML (ref 0–101.8)

## 2021-10-15 LAB
METANEPH FREE SERPL-MCNC: 16.2 PG/ML (ref 0–88)
NORMETANEPHRINE SERPL-MCNC: 89 PG/ML (ref 0–191.8)

## 2023-03-21 ENCOUNTER — HOSPITAL ENCOUNTER (EMERGENCY)
Facility: HOSPITAL | Age: 73
Discharge: HOME | End: 2023-03-21
Attending: SURGERY

## 2023-03-21 ENCOUNTER — APPOINTMENT (EMERGENCY)
Dept: RADIOLOGY | Facility: HOSPITAL | Age: 73
End: 2023-03-21

## 2023-03-21 ENCOUNTER — APPOINTMENT (EMERGENCY)
Dept: CT IMAGING | Facility: HOSPITAL | Age: 73
End: 2023-03-21

## 2023-03-21 VITALS
WEIGHT: 169.75 LBS | TEMPERATURE: 97 F | DIASTOLIC BLOOD PRESSURE: 72 MMHG | HEART RATE: 82 BPM | OXYGEN SATURATION: 94 % | RESPIRATION RATE: 16 BRPM | BODY MASS INDEX: 35.48 KG/M2 | SYSTOLIC BLOOD PRESSURE: 155 MMHG
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
## 2023-03-21 DIAGNOSIS — M54.2 CERVICAL SPINE PAIN: ICD-10-CM

## 2023-03-21 DIAGNOSIS — M54.6 THORACIC SPINE PAIN: ICD-10-CM

## 2023-03-21 DIAGNOSIS — S01.01XA LACERATION OF SCALP, INITIAL ENCOUNTER: ICD-10-CM

## 2023-03-21 DIAGNOSIS — S01.01XA SCALP LACERATION, INITIAL ENCOUNTER: ICD-10-CM

## 2023-03-21 DIAGNOSIS — T68.XXXA HYPOTHERMIA, INITIAL ENCOUNTER: ICD-10-CM

## 2023-03-21 RX ORDER — LIDOCAINE HYDROCHLORIDE AND EPINEPHRINE 10; 10 MG/ML; UG/ML
5 INJECTION, SOLUTION INFILTRATION; PERINEURAL ONCE
Status: COMPLETED | OUTPATIENT
Start: 2023-03-21 | End: 2023-03-21

## 2023-03-21 RX ORDER — SODIUM CHLORIDE, SODIUM GLUCONATE, SODIUM ACETATE, POTASSIUM CHLORIDE, MAGNESIUM CHLORIDE, SODIUM PHOSPHATE, DIBASIC, AND POTASSIUM PHOSPHATE .53; .5; .37; .037; .03; .012; .00082 G/100ML; G/100ML; G/100ML; G/100ML; G/100ML; G/100ML; G/100ML
INJECTION, SOLUTION INTRAVENOUS
Status: COMPLETED | OUTPATIENT
Start: 2023-03-21 | End: 2023-03-21

## 2023-03-21 RX ADMIN — TETANUS TOXOID, REDUCED DIPHTHERIA TOXOID AND ACELLULAR PERTUSSIS VACCINE, ADSORBED 0.5 ML: 5; 2.5; 8; 8; 2.5 SUSPENSION INTRAMUSCULAR at 05:41

## 2023-03-21 RX ADMIN — SODIUM CHLORIDE, SODIUM GLUCONATE, SODIUM ACETATE, POTASSIUM CHLORIDE, MAGNESIUM CHLORIDE, SODIUM PHOSPHATE, DIBASIC, AND POTASSIUM PHOSPHATE 1000 ML: .53; .5; .37; .037; .03; .012; .00082 INJECTION, SOLUTION INTRAVENOUS at 03:40

## 2023-03-21 RX ADMIN — LIDOCAINE HYDROCHLORIDE,EPINEPHRINE BITARTRATE 5 ML: 10; .01 INJECTION, SOLUTION INFILTRATION; PERINEURAL at 05:00

## 2023-03-21 NOTE — ASSESSMENT & PLAN NOTE
· Patient accidentally rolled from her bed and struck the back of her head on the corner of her nightstand resulting in the injuries above

## 2023-03-21 NOTE — ASSESSMENT & PLAN NOTE
· Lower thoracic spine pain after a fall from bed  · CT chest/abdomen/pelvis negative for thoracic spine fracture or any other abnormality

## 2023-03-21 NOTE — ED PROVIDER NOTES
Emergency Department Airway Evaluation and Management Form    History  Obtained from: patient and EMS  Patient has no known allergies  No chief complaint on file  HPI Patient is a 71year old female who fell out of bed this AM  Has h/o brain aneurysm  Patient is on plavix  (+) scalp laceration  PERRL 3 mm and TMs clear and oropharynx clear  ED resident Dr Armida Burnett evaluated patient's airway  Airway intact  Trauma team evaluating patient in trauma ED  Past Medical History:   Diagnosis Date   • Aneurysm of basilar artery ([facility] [address] ) 07/2015   • Arthritis    • Cardiac arrest [facility]) 2014   • Complex partial seizure with impairment of consciousness at onset [facility]) 07/2017   • Dependent on wheelchair     "at times"   • Diabetes mellitus ([facility] [address] )     IDDM   • Disease of thyroid gland    • Hyperlipidemia    • Hypertension    • Left hip pain    • Left knee pain    • Muscle weakness     right sided weakness   • Risk for falls    • Seizures ([facility] [address] )     "pt reports none recent"   • Sleep apnea     "doesn't use machine"   • Stenosis of middle cerebral artery 07/2015   • Stroke (HCC)     B BG, R frontal, L parietal, L pontine   • Unsteady gait    • Uses walker      Past Surgical History:   Procedure Laterality Date   • ABDOMINAL SURGERY      benign mass per pt   • DILATION AND CURETTAGE OF UTERUS     • HYSTERECTOMY     • RI TOTAL HIP ARTHROPLASTY Left 4/27/2018    Procedure: ARTHROPLASTY HIP TOTAL;  Surgeon: Nathalia Ahn MD;  Location: [facility];  Service: Orthopedics   • TONSILLECTOMY     • WISDOM TOOTH EXTRACTION       Family History   Problem Relation Age of Onset   • Arthritis Mother    • Heart disease Father    • Diabetes Father      Social History     Tobacco Use   • Smoking status: Never   • Smokeless tobacco: Never   Substance Use Topics   • Alcohol use: Yes     Comment: social   • Drug use: No     I have reviewed and agree with the history as documented      Review of Systems    Physical Exam  /72   Pulse 79   Temp (!) 92 7 °F (33 7 °C) (Rectal)   Resp 17   Wt 77 kg (169 lb 12 1 oz)   SpO2 95%   BMI 35 48 kg/m²     Physical Exam    ED Medications  Medications   multi-electrolyte (ISOLYTE-S PH 7 4) bolus (1,000 mL Intravenous New Bag 3/21/23 9426)       Intubation  Procedures    Notes      Final Diagnosis  Final diagnoses:   None       ED Provider  Electronically Signed by     Christi Khalil MD  03/21/23 0391

## 2023-03-21 NOTE — PROCEDURES
Laceration repair    Date/Time: 3/21/2023 5:05 AM  Performed by: Josselin Washington DO  Authorized by: Josselin Washington DO   Consent: Verbal consent obtained  Risks and benefits: risks, benefits and alternatives were discussed  Consent given by: patient  Test results: test results available and properly labeled  Site marked: the operative site was marked  Radiology Images displayed and confirmed  If images not available, report reviewed: imaging studies available  Required items: required blood products, implants, devices, and special equipment available  Patient identity confirmed: verbally with patient and arm band  Body area: head/neck  Location details: scalp  Laceration length: 2 5 cm  Foreign bodies: no foreign bodies  Tendon involvement: none  Nerve involvement: none  Vascular damage: no  Anesthesia: local infiltration    Anesthesia:  Local Anesthetic: lidocaine 1% with epinephrine  Anesthetic total: 1 5 mL    Sedation:  Patient sedated: no      Wound Dehiscence:  Superficial Wound Dehiscence: simple closure      Procedure Details:  Preparation: Patient was prepped and draped in the usual sterile fashion    Irrigation solution: saline and tap water  Irrigation method: syringe  Amount of cleaning: extensive  Debridement: none  Degree of undermining: none  Skin closure: staples  Number of sutures: 4  Approximation: close  Approximation difficulty: simple  Patient tolerance: patient tolerated the procedure well with no immediate complications  Comments: 4 staples needed to close laceration

## 2023-03-21 NOTE — ASSESSMENT & PLAN NOTE
· Patient accidentally rolled off her bed and struck her head on the corner of her nightstand  · Patient found to have a 2 5 cm laceration in the center of her occiput  · Laceration repaired with 4 staples and a dressing was applied

## 2023-03-21 NOTE — DISCHARGE INSTRUCTIONS
Call and schedule a follow-up appointment with your family doctor  Please have your staples removed in the next 7 to 10 days, you can return to the emergency department for removal, your family doctor may also remove the staples, but I would call ahead and clarify that they are comfortable doing so  Please return to the emergency department should you develop any significant headache, focal numbness or weakness, repeat/worsening bleeding, or any other concerning signs or symptoms

## 2023-03-21 NOTE — H&P
[facility]  H&P Note - Ratna Eddy 1950, 71 y o  female MRN: [id number]  Unit/Bed#: ED-26 Encounter: [id number]  Primary Care Provider: Daphine Mcardle, DO   Date and time admitted to hospital: 3/21/2023  3:31 AM    Hypothermia  Assessment & Plan  · Mild hypothermia found with initial temperature of 92 7 °F  · Patient immediately given warm blankets and placed on Nickie hugger  · Also given supplemental warmed IV fluids  · Repeat temperature 97 0 after rewarming efforts    Cervical spine pain  Assessment & Plan  · Midline cervical spine pain after a fall from bed  · Patient initially in cervical collar  · CT C-spine negative for fracture, C-collar removed    Thoracic spine pain  Assessment & Plan  · Lower thoracic spine pain after a fall from bed  · CT chest/abdomen/pelvis negative for thoracic spine fracture or any other abnormality    Scalp laceration, initial encounter  Assessment & Plan  · Patient accidentally rolled off her bed and struck her head on the corner of her nightstand  · Patient found to have a 2 5 cm laceration in the center of her occiput  · Laceration repaired with 4 staples and a dressing was applied    * Fall  Assessment & Plan  · Patient accidentally rolled from her bed and struck the back of her head on the corner of her nightstand resulting in the injuries above      Trauma Alert: Level B   Model of Arrival: Ambulance    Trauma Team: Attending Dr Tristan Beach and Residents Dr Ponce Jo  Consultants:     None     History of Present Illness     Chief Complaint: Fall, scalp laceration  Mechanism:Fall     HPI:    Ratna Eddy is a 71 y o  female who presents with posterior scalp laceration after she accidentally rolled from her bed and struck her head on the corner of her nightstand, she takes Plavix daily  Patient endorses mild tenderness in her neck and mid back  She denies any tenderness or injuries to her extremities    She denies any dizziness, confusion, syncope, focal numbness or weakness  Patient was initially found to be hypothermic with a temperature of 92 7, she was placed on a Nickie hugger, given warmed IV fluids, repeat temperature was 97 0 rectally  Patient's scalp laceration was repaired using staples  CT scans of the head, C-spine, chest/abdomen/pelvis were all negative  Patient stable for discharge home at this time  Review of Systems   Constitutional: Negative for chills and fever  HENT: Negative for congestion and sore throat  Eyes: Negative for photophobia and visual disturbance  Respiratory: Negative for chest tightness and shortness of breath  Cardiovascular: Negative for chest pain and palpitations  Gastrointestinal: Negative for abdominal pain and nausea  Endocrine: Negative for polyphagia and polyuria  Genitourinary: Negative for dysuria and hematuria  Musculoskeletal: Positive for back pain and neck pain  Negative for arthralgias and myalgias  Skin: Negative for rash and wound  Neurological: Negative for dizziness, syncope, weakness and headaches  Psychiatric/Behavioral: Negative for agitation  The patient is not nervous/anxious  12-point, complete review of systems was reviewed and negative except as stated above       Historical Information     Past Medical History:   Diagnosis Date   • Aneurysm of basilar artery ([facility] ) 07/2015   • Arthritis    • Cardiac arrest [facility]) 2014   • Complex partial seizure with impairment of consciousness at onset [facility]) 07/2017   • Dependent on wheelchair     "at times"   • Diabetes mellitus ([facility] )     IDDM   • Disease of thyroid gland    • Hyperlipidemia    • Hypertension    • Left hip pain    • Left knee pain    • Muscle weakness     right sided weakness   • Risk for falls    • Seizures ([place] [id number] )     "pt reports none recent"   • Sleep apnea     "doesn't use machine"   • Stenosis of middle cerebral artery 07/2015   • Stroke ([facility])     B BG, R frontal, L parietal, L pontine   • Unsteady gait    • Uses walker      Past Surgical History:   Procedure Laterality Date   • ABDOMINAL SURGERY      benign mass per pt   • DILATION AND CURETTAGE OF UTERUS     • HYSTERECTOMY     • OR TOTAL HIP ARTHROPLASTY Left 4/27/2018    Procedure: ARTHROPLASTY HIP TOTAL;  Surgeon: Steven Hendricks MD;  Location: [facility];  Service: Orthopedics   • TONSILLECTOMY     • WISDOM TOOTH EXTRACTION          Social History     Tobacco Use   • Smoking status: Never   • Smokeless tobacco: Never   Substance Use Topics   • Alcohol use: Yes     Comment: social   • Drug use: No     Immunization History   Administered Date(s) Administered   • COVID-19 PFIZER VACCINE 0 3 ML IM 03/11/2021, 04/01/2021   • Tdap 03/21/2023     Last Tetanus: unsure, Tdap given here today 3/21/2023  Family History: Non-contributory    1  Before the illness or injury that brought you to the Emergency, did you need someone to help you on a regular basis? 0=No   2  Since the illness or injury that brought you to the Emergency, have you needed more help than usual to take care of yourself? 0=No   3  Have you been hospitalized for one or more nights during the past 6 months (excluding a stay in the Emergency Department)? 0=No   4  In general, do you see well? 0=Yes   5  In general, do you have serious problems with your memory? 0=No   6  Do you take more than three different medications everyday? 1=Yes   TOTAL   1     Did you order a geriatric consult if the score was 2 or greater?: no     Meds/Allergies   all current active meds have been reviewed  Allergies have not been reviewed;   No Known Allergies    Objective   Initial Vitals:   Temperature: (!) 92 7 °F (33 7 °C) (03/21/23 0339)  Pulse: 79 (03/21/23 0334)  Respirations: 17 (03/21/23 0334)  Blood Pressure: 166/72 (03/21/23 0334)    Primary Survey:   Airway:        Status: patent;        Pre-hospital Interventions: none        Hospital Interventions: none  Breathing:        Pre-hospital Interventions: none       Effort: normal       Right breath sounds: normal       Left breath sounds: normal  Circulation:        Rhythm: regular       Rate: regular   Right Pulses Left Pulses    R radial: 2+  R femoral: 2+       L radial: 2+  L femoral: 2+         Disability:        GCS: Eye: 4; Verbal: 4 Motor: 6 Total: 14       Right Pupil: round;  reactive         Left Pupil:  round;  reactive      R Motor Strength L Motor Strength    R : 5/5  R dorsiflex: 5/5  R plantarflex: 5/5 L : 5/5  L dorsiflex: 5/5  L plantarflex: 5/5        Sensory:  No sensory deficit  Exposure:       Completed: Yes      Secondary Survey:  Physical Exam  Constitutional:       General: She is not in acute distress  HENT:      Head: Normocephalic  Right Ear: External ear normal       Left Ear: External ear normal       Nose: No congestion or rhinorrhea  Mouth/Throat:      Mouth: Mucous membranes are moist       Pharynx: Oropharynx is clear  Eyes:      Extraocular Movements: Extraocular movements intact  Pupils: Pupils are equal, round, and reactive to light  Cardiovascular:      Rate and Rhythm: Normal rate and regular rhythm  Pulses: Normal pulses  Pulmonary:      Effort: Pulmonary effort is normal  No respiratory distress  Breath sounds: Normal breath sounds  Abdominal:      General: Abdomen is flat  Palpations: Abdomen is soft  Tenderness: There is no abdominal tenderness  Musculoskeletal:         General: No swelling  Cervical back: No rigidity  Thoracic back: Tenderness present  Lumbar back: Normal       Comments: Cervical spine tenderness to palpation, lower thoracic spine tenderness to palpation   Skin:     Coloration: Skin is not jaundiced  Findings: No bruising  Neurological:      General: No focal deficit present  Mental Status: She is alert  Mental status is at baseline  Motor: No weakness        Comments: Oriented x2, not oriented to time   Psychiatric: Mood and Affect: Mood normal          Behavior: Behavior normal          Invasive Devices     Peripheral Intravenous Line  Duration           Peripheral IV 03/21/23 Left Antecubital <1 day              Lab Results: Results: I have personally reviewed all pertinent laboratory/tests results    Imaging Results: I have personally reviewed pertinent reports  Chest Xray(s): negative for acute findings   FAST exam(s): negative for acute findings   CT Scan(s): negative for acute findings   Additional Xray(s): N/A     Other Studies: N/A    Code Status: Prior  Advance Directive and Living Will:      Power of : Yes  POLST:    I have spent 45 minutes with Patient  today in which greater than 50% of this time was spent in counseling/coordination of care regarding Diagnostic results, Prognosis, Risks and benefits of tx options, Impressions, Counseling / Coordination of care, Documenting in the medical record, Reviewing / ordering tests, medicine, procedures  , Obtaining or reviewing history   and Communicating with other healthcare professionals   Patient is stable for discharge at this time, patient advised to follow-up with her family doctor, patient given precautions regarding her staples and instructed to return return to the emergency department or her family doctor for removal in 7 to 10 days

## 2023-03-21 NOTE — ASSESSMENT & PLAN NOTE
· Mild hypothermia found with initial temperature of 92 7 °F  · Patient immediately given warm blankets and placed on Nickie hugger  · Also given supplemental warmed IV fluids  · Repeat temperature 97 0 after rewarming efforts

## 2023-03-21 NOTE — ASSESSMENT & PLAN NOTE
· Midline cervical spine pain after a fall from bed  · Patient initially in cervical collar  · CT C-spine negative for fracture, C-collar removed

## 2023-03-26 ENCOUNTER — HOSPITAL ENCOUNTER (EMERGENCY)
Facility: HOSPITAL | Age: 73
Discharge: HOME | End: 2023-03-26
Attending: EMERGENCY MEDICINE

## 2023-03-26 VITALS
RESPIRATION RATE: 16 BRPM | HEART RATE: 80 BPM | SYSTOLIC BLOOD PRESSURE: 123 MMHG | DIASTOLIC BLOOD PRESSURE: 61 MMHG | OXYGEN SATURATION: 99 % | TEMPERATURE: 97.9 F

## 2023-03-26 DIAGNOSIS — S01.01XD SCALP LACERATION, SUBSEQUENT ENCOUNTER: Primary | ICD-10-CM

## 2023-03-26 DIAGNOSIS — R60.0 PEDAL EDEMA: ICD-10-CM

## 2023-03-26 DIAGNOSIS — R73.9 HYPERGLYCEMIA: ICD-10-CM

## 2023-03-26 LAB
ALBUMIN SERPL BCP-MCNC: 3.1 G/DL (ref 3.5–5)
ALP SERPL-CCNC: 38 U/L (ref 34–104)
ALT SERPL W P-5'-P-CCNC: 12 U/L (ref 7–52)
ANION GAP SERPL CALCULATED.3IONS-SCNC: 11 MMOL/L (ref 4–13)
AST SERPL W P-5'-P-CCNC: 12 U/L (ref 13–39)
BASOPHILS # BLD AUTO: 0.04 THOUSANDS/ÂΜL (ref 0–0.1)
BASOPHILS NFR BLD AUTO: 0 % (ref 0–1)
BILIRUB SERPL-MCNC: 0.76 MG/DL (ref 0.2–1)
BUN SERPL-MCNC: 22 MG/DL (ref 5–25)
CALCIUM ALBUM COR SERPL-MCNC: 9.4 MG/DL (ref 8.3–10.1)
CALCIUM SERPL-MCNC: 8.7 MG/DL (ref 8.4–10.2)
CARDIAC TROPONIN I PNL SERPL HS: 6 NG/L
CHLORIDE SERPL-SCNC: 102 MMOL/L (ref 96–108)
CO2 SERPL-SCNC: 24 MMOL/L (ref 21–32)
CREAT SERPL-MCNC: 1 MG/DL (ref 0.6–1.3)
EOSINOPHIL # BLD AUTO: 0.04 THOUSAND/ÂΜL (ref 0–0.61)
EOSINOPHIL NFR BLD AUTO: 0 % (ref 0–6)
ERYTHROCYTE [DISTWIDTH] IN BLOOD BY AUTOMATED COUNT: 13.5 % (ref 11.6–15.1)
GFR SERPL CREATININE-BSD FRML MDRD: 56 ML/MIN/1.73SQ M
GLUCOSE SERPL-MCNC: 246 MG/DL (ref 65–140)
HCT VFR BLD AUTO: 28.2 % (ref 34.8–46.1)
HGB BLD-MCNC: 9.4 G/DL (ref 11.5–15.4)
IMM GRANULOCYTES # BLD AUTO: 0.06 THOUSAND/UL (ref 0–0.2)
IMM GRANULOCYTES NFR BLD AUTO: 1 % (ref 0–2)
LYMPHOCYTES # BLD AUTO: 0.93 THOUSANDS/ÂΜL (ref 0.6–4.47)
LYMPHOCYTES NFR BLD AUTO: 10 % (ref 14–44)
MCH RBC QN AUTO: 31.1 PG (ref 26.8–34.3)
MCHC RBC AUTO-ENTMCNC: 33.3 G/DL (ref 31.4–37.4)
MCV RBC AUTO: 93 FL (ref 82–98)
MONOCYTES # BLD AUTO: 1.01 THOUSAND/ÂΜL (ref 0.17–1.22)
MONOCYTES NFR BLD AUTO: 11 % (ref 4–12)
NEUTROPHILS # BLD AUTO: 7.45 THOUSANDS/ÂΜL (ref 1.85–7.62)
NEUTS SEG NFR BLD AUTO: 78 % (ref 43–75)
NRBC BLD AUTO-RTO: 0 /100 WBCS
PLATELET # BLD AUTO: 298 THOUSANDS/UL (ref 149–390)
PMV BLD AUTO: 11.1 FL (ref 8.9–12.7)
POTASSIUM SERPL-SCNC: 4.2 MMOL/L (ref 3.5–5.3)
PROT SERPL-MCNC: 5.7 G/DL (ref 6.4–8.4)
RBC # BLD AUTO: 3.02 MILLION/UL (ref 3.81–5.12)
SODIUM SERPL-SCNC: 137 MMOL/L (ref 135–147)
WBC # BLD AUTO: 9.53 THOUSAND/UL (ref 4.31–10.16)

## 2023-03-26 RX ORDER — LIDOCAINE HYDROCHLORIDE AND EPINEPHRINE 10; 10 MG/ML; UG/ML
5 INJECTION, SOLUTION INFILTRATION; PERINEURAL ONCE
Status: COMPLETED | OUTPATIENT
Start: 2023-03-26 | End: 2023-03-26

## 2023-03-26 RX ADMIN — LIDOCAINE HYDROCHLORIDE,EPINEPHRINE BITARTRATE 5 ML: 10; .01 INJECTION, SOLUTION INFILTRATION; PERINEURAL at 11:22

## 2023-03-26 NOTE — ED NOTES
Ambulatory to bathroom assist x [phone] [place] [address] WM, RN  03/26/23 4244

## 2023-03-26 NOTE — DISCHARGE INSTRUCTIONS
A new suture was placed in the scalp laceration  This and the 4 staples should be removed in approximately 1 week (6-8 days)  This may be performed in the emergency department and/or your primary care physician's office (if staff indicates that this is a procedure they can perform)  Continue keeping a log of your blood sugar results  If they remain higher than usual adjustment in your insulin dosing may be necessary  Elevate feet when resting and minimize salt intake which can contribute to swelling  Follow-up with cardiology as planned including undergoing testing-echocardiogram and duplex examination ordered  Return as needed for any worsening/new concerns

## 2023-03-27 LAB
ATRIAL RATE: 83 BPM
P AXIS: 41 DEGREES
PR INTERVAL: 138 MS
QRS AXIS: -7 DEGREES
QRSD INTERVAL: 82 MS
QT INTERVAL: 392 MS
QTC INTERVAL: 460 MS
T WAVE AXIS: 20 DEGREES
VENTRICULAR RATE: 83 BPM

## 2023-03-27 NOTE — ED PROVIDER NOTES
History  Chief Complaint   Patient presents with   • Head Laceration     Pt presents to the ED for head laceration re titusal  Seen in the ED for scalp lac and repaired  Reports waking up this morning in a pool of blood  Patient is a 78-year-old female who presents to the emergency department from home after waking with a large quantity of blood having soaked her pillow  Her  is at bedside and both contribute to history  I additionally reviewed her ED visit note from March 21 when she was seen after rolling and impacting her head into her nightstand  She takes Plavix and required closure of a 2 5 cm scalp laceration  She had generally been feeling well since the ED visit and was surprised to find bleeding today  The area is sore upon palpation though painless when not touched  She denies awareness of any trauma or particularly restless sleep which may have led to the bleeding  She has not appreciated new bleeding elsewhere  During evaluation patient's aide was available by speaker phone and voiced concern over hyperglycemia last night  Glucose was 410  She also expressed concern for some ongoing foot swelling but relates that her cardiology practice had ordered an echocardiogram as well as bilateral duplex studies  Patient has some ambulatory difficulties at baseline and has been advised to use a walker  She often ambulates without this walker  She has not appreciated increased difficulty since head strike  With regard to her diabetes she takes metformin each morning, Lantus 12 units at night and short acting insulin 6 units with each meal   She reports compliance with this  Her aide notes that short acting insulin dose was recently decreased from 7 units before every meal due to some episodes of hypoglycemia  Patient initially denied awareness of any change in diet or activity which may have contributed to hyperglycemia    She later noted that she had consumed some [employer] food some of which had sweet seasoning  Prior to Admission Medications   Prescriptions Last Dose Informant Patient Reported? Taking?    CLONIDINE HCL PO   Yes No   Sig: Take 0 3 mg by mouth daily   FLUoxetine (PROzac) 20 MG tablet   Yes No   Sig: Take 20 mg by mouth daily at bedtime     Glucosamine-Chondroit-Vit C-Mn (GLUCOSAMINE CHONDR 1500 COMPLX PO)   Yes No   Sig: Take 1 tablet by mouth 2 (two) times a day   Omega-3 1000 MG CAPS   Yes No   Sig: Take by mouth 2 (two) times a day   acetaminophen (TYLENOL) 325 mg tablet   No No   Sig: Take 3 tablets (975 mg total) by mouth every 8 (eight) hours   amLODIPine (NORVASC) 10 mg tablet   Yes No   Sig: Take 10 mg by mouth daily   atorvastatin (LIPITOR) 20 mg tablet   Yes No   Sig: Take 40 mg by mouth daily    clopidogrel (PLAVIX) 75 mg tablet   Yes No   Sig: TAKE 1 TABLET BY MOUTH  DAILY   docusate sodium (COLACE) 100 mg capsule   No No   Sig: Take 1 capsule (100 mg total) by mouth 2 (two) times a day   fenofibrate (TRICOR) 145 mg tablet   Yes No   Sig: Take 145 mg by mouth daily   hydrochlorothiazide (HYDRODIURIL) 25 mg tablet   Yes No   Sig: Take 25 mg by mouth daily   ibuprofen (MOTRIN) 600 mg tablet   No No   Sig: Take 1 tablet (600 mg total) by mouth every 8 (eight) hours as needed for moderate pain (use sparingly)   insulin glargine (LANTUS) 100 units/mL subcutaneous injection   No No   Sig: Inject 12 Units under the skin daily at bedtime   insulin lispro (HumaLOG) 100 units/mL injection   No No   Sig: Inject 6 Units under the skin 3 (three) times a day before meals   levETIRAcetam (KEPPRA) 250 mg tablet   Yes No   Sig: Take 500 mg by mouth 2 (two) times a day   levothyroxine 75 mcg tablet   Yes No   Sig: Take 75 mcg by mouth daily in the early morning     lidocaine (LIDODERM) 5 %   No No   Sig: Apply 2 patches topically daily Remove & Discard patch within 12 hours or as directed by MD   lisinopril (ZESTRIL) 30 mg tablet   Yes No   Sig: Take 30 mg by mouth daily "  melatonin 3 mg   No No   Sig: Take 1 tablet (3 mg total) by mouth daily at bedtime as needed (insomnia)   menthol-methyl salicylate (BENGAY) 27-93 % cream   No No   Sig: Apply topically 3 (three) times a day   metFORMIN (GLUCOPHAGE) 1000 MG tablet   Yes No   Sig: Take 1,000 mg by mouth daily with breakfast     methocarbamol (ROBAXIN) 750 mg tablet   No No   Sig: Take 1 tablet (750 mg total) by mouth every 6 (six) hours   polyethylene glycol (MIRALAX) 17 g packet   No No   Sig: Take 17 g by mouth daily as needed (constipation)      Facility-Administered Medications: None       Past Medical History:   Diagnosis Date   • Aneurysm of basilar artery ([facility] ) 07/2015   • Arthritis    • Cardiac arrest ([facility] ) 2014   • Complex partial seizure with impairment of consciousness at onset [facility]) 07/2017   • Dependent on wheelchair     \"at times\"   • Diabetes mellitus ([facility] )     IDDM   • Disease of thyroid gland    • Hyperlipidemia    • Hypertension    • Left hip pain    • Left knee pain    • Muscle weakness     right sided weakness   • Risk for falls    • Seizures ([facility] )     \"pt reports none recent\"   • Sleep apnea     \"doesn't use machine\"   • Stenosis of middle cerebral artery 07/2015   • Stroke ([facility])     B BG, R frontal, L parietal, L pontine   • Unsteady gait    • Uses walker        Past Surgical History:   Procedure Laterality Date   • ABDOMINAL SURGERY      benign mass per pt   • DILATION AND CURETTAGE OF UTERUS     • HYSTERECTOMY     • [place] TOTAL HIP ARTHROPLASTY Left 4/27/2018    Procedure: ARTHROPLASTY HIP TOTAL;  Surgeon: Carmela Rosario MD;  Location: [facility];  Service: Orthopedics   • TONSILLECTOMY     • WISDOM TOOTH EXTRACTION         Family History   Problem Relation Age of Onset   • Arthritis Mother    • Heart disease Father    • Diabetes Father      I have reviewed and agree with the history as documented      E-Cigarette/Vaping     E-Cigarette/Vaping Substances     Social History     Tobacco Use   • Smoking status: " Never   • Smokeless tobacco: Never   Substance Use Topics   • Alcohol use: Yes     Comment: social   • Drug use: No       Review of Systems   Constitutional: Negative for fever  Respiratory: Negative for shortness of breath  Cardiovascular: Negative for chest pain  Genitourinary: Positive for frequency ( Last night)  Negative for decreased urine volume, difficulty urinating and dysuria  Neurological: Negative for light-headedness  All other systems reviewed and are negative  Physical Exam  Physical Exam  Vitals and nursing note reviewed  Constitutional:       Appearance: Normal appearance  She is not ill-appearing  HENT:      Head:      Comments: Patient with large area of matted bloody hair over the posterior aspect of her head  Much effort was required to loosen this in order to visualize laceration site  Ultimately 2 5 cm laceration identified  4 staples remain in place  Venous streaming appreciated at edge of laceration-left/posterior aspect  This transiently ceased with pressure application and restarted  upon removal of pressure  Mild scalp swelling appreciated around this without fluctuance  Ecchymosis appreciated around laceration as well as inferiorly extending to left posterior neck  Cardiovascular:      Rate and Rhythm: Normal rate and regular rhythm  Pulmonary:      Effort: Pulmonary effort is normal       Breath sounds: Normal breath sounds  No rales  Abdominal:      Palpations: Abdomen is soft  Tenderness: There is no abdominal tenderness  Musculoskeletal:         General: No tenderness  Cervical back: Normal range of motion and neck supple  Comments: Mild bilateral pedal edema  Ankles and calves without appreciable edema or tenderness  No discoloration or increased warmth   +2 PT pulses  Skin:     General: Skin is warm and dry  Neurological:      Mental Status: She is alert and oriented to person, place, and time     Psychiatric:         Mood and Affect: Mood normal          Behavior: Behavior normal          Vital Signs  ED Triage Vitals [03/26/23 0956]   Temperature Pulse Respirations Blood Pressure SpO2   97 9 °F (36 6 °C) 68 16 168/70 99 %      Temp Source Heart Rate Source Patient Position - Orthostatic VS BP Location FiO2 (%)   Oral Monitor Sitting Right arm --      Pain Score       2           Vitals:    03/26/23 0956 03/26/23 1252   BP: 168/70 123/61   Pulse: 68 80   Patient Position - Orthostatic VS: Sitting Sitting         Visual Acuity      ED Medications  Medications   lidocaine-epinephrine (XYLOCAINE/EPINEPHRINE) 1 %-1:100,000 injection 5 mL (5 mL Infiltration Given 3/26/23 1122)       Diagnostic Studies  Results Reviewed     Procedure Component Value Units Date/Time    HS Troponin 0hr (reflex protocol) [[id number]]  (Normal) Collected: 03/26/23 1118    Lab Status: Final result Specimen: Blood from Arm, Left Updated: 03/26/23 1200     hs TnI 0hr 6 ng/L     Comprehensive metabolic panel [[id number]]  (Abnormal) Collected: 03/26/23 1118    Lab Status: Final result Specimen: Blood from Arm, Left Updated: 03/26/23 1150     Sodium 137 mmol/L      Potassium 4 2 mmol/L      Chloride 102 mmol/L      CO2 24 mmol/L      ANION GAP 11 mmol/L      BUN 22 mg/dL      Creatinine 1 00 mg/dL      Glucose 246 mg/dL      Calcium 8 7 mg/dL      Corrected Calcium 9 4 mg/dL      AST 12 U/L      ALT 12 U/L      Alkaline Phosphatase 38 U/L      Total Protein 5 7 g/dL      Albumin 3 1 g/dL      Total Bilirubin 0 76 mg/dL      eGFR 56 ml/min/1 73sq m     Narrative:      Maru guidelines for Chronic Kidney Disease (CKD):   •  Stage 1 with normal or high GFR (GFR > 90 mL/min/1 73 square meters)  •  Stage 2 Mild CKD (GFR = 60-89 mL/min/1 73 square meters)  •  Stage 3A Moderate CKD (GFR = 45-59 mL/min/1 73 square meters)  •  Stage 3B Moderate CKD (GFR = 30-44 mL/min/1 73 square meters)  •  Stage 4 Severe CKD (GFR = 15-29 mL/min/1 73 square meters)  •  Stage 5 End Stage CKD (GFR <15 mL/min/1 73 square meters)  Note: GFR calculation is accurate only with a steady state creatinine    CBC and differential [[id number]]  (Abnormal) Collected: 03/26/23 1118    Lab Status: Final result Specimen: Blood from Arm, Left Updated: 03/26/23 1127     WBC 9 53 Thousand/uL      RBC 3 02 Million/uL      Hemoglobin 9 4 g/dL      Hematocrit 28 2 %      MCV 93 fL      MCH 31 1 pg      MCHC 33 3 g/dL      RDW 13 5 %      MPV 11 1 fL      Platelets 094 Thousands/uL      nRBC 0 /100 WBCs      Neutrophils Relative 78 %      Immat GRANS % 1 %      Lymphocytes Relative 10 %      Monocytes Relative 11 %      Eosinophils Relative 0 %      Basophils Relative 0 %      Neutrophils Absolute 7 45 Thousands/µL      Immature Grans Absolute 0 06 Thousand/uL      Lymphocytes Absolute 0 93 Thousands/µL      Monocytes Absolute 1 01 Thousand/µL      Eosinophils Absolute 0 04 Thousand/µL      Basophils Absolute 0 04 Thousands/µL                  No orders to display              Procedures  Laceration repair    Date/Time: 3/26/2023 11:30 AM  Performed by: Damien England MD  Authorized by: Damien England MD   Consent: Verbal consent obtained  Consent given by: patient  Patient understanding: patient states understanding of the procedure being performed  Body area: head/neck  Location details: scalp  Laceration length: 2 5 cm  Anesthesia: local infiltration    Anesthesia:  Local Anesthetic: lidocaine 1% with epinephrine  Anesthetic total: 2 mL    Wound Dehiscence:  Superficial Wound Dehiscence: simple closure      Procedure Details:  Preparation: Patient was prepped and draped in the usual sterile fashion  Irrigation solution: tap water  Irrigation method: tap  Amount of cleaning: extensive  Skin closure: 4-0 Prolene  Number of sutures: 1  Suture technique: Figure-of-eight    Approximation: close  Approximation difficulty: simple  Patient tolerance: patient tolerated the procedure well with no immediate complications  Comments: Hemostasis achieved following infiltration with lidocaine/epinephrine  Site reassessed over an hour later and was found to be dry without any evidence of recurrent bleeding  ED Course  ED Course as of 03/27/23 0006   Emily Cadena Mar 26, 2023   1106 While working to identify site of bleeding (beneath matted bloody hair) patient reports foot swelling which has been ongoing for several days as well as elevated blood glucose of 410 last night  She endorses taking all of her insulin properly  Her insulin with meals was recently decreased from 7 units to 6 units due to some low numbers  She did consume [employer] food yesterday and admits that some of the seasoning was sweet  We will check labs here  Leg/foot swelling is quite mild  She does have upcoming outpatient echo and lower extremity duplex  She does not have tenderness of the legs nor asymmetry of these at this time  1107 Laceration finally visualized  4 staples identified as expected  Bleeding appreciated near edge of laceration left/posterior side  Bright red streaming from site  Will obtain lidocaine with epinephrine and consider additional staple and/or suture use  1137 Prolene suture placed   1322 Patient has had no further bleeding from laceration site  She feels well-just has not had an urge to urinate  Glucose is elevated at 246, much decreased from that last night  She had some urinary frequency around the time of glucose elevation last night though otherwise no urinary symptoms  UA had initially been ordered in consideration of possible UTI which may have led to significant hyperglycemia  More likely this was elevated as a result of different foods consumed  Patient unable to provide urine specimen at this time  We will cancel test   Patient aware to reach out to her PCP should she develop any new urinary symptoms    She will continue on her normal doses of insulin  If glucoses remain running higher than typical she will additionally reach out to her PCP  We did discuss that hemoglobin was decreased from approximately a month ago  This drop accounts for any small amounts of internal bleeding from bruising and fall sustained 5 days ago as well as bleeding from the scalp which is no longer active  She does not have any symptoms of anemia and will be discharged home  With regard to mild foot swelling which has been ongoing-she does not appear volume overloaded and does not have any acute findings on EKG or troponin to suggest ACS/change in cardiac function  Likely some of the swelling is related to decreased activity since fall  Have encouraged elevation  Additionally encouraged her to proceed with outpatient tests ordered by her cardiologist                                              MDM    Disposition  Final diagnoses:   Scalp laceration, subsequent encounter   Hyperglycemia   Pedal edema     Time reflects when diagnosis was documented in both MDM as applicable and the Disposition within this note     Time User Action Codes Description Comment    3/26/2023  1:24 PM Payton POLANCO Add [S01 01XD] Scalp laceration, subsequent encounter     3/26/2023  1:25 PM Payton POLANCO Add [R73 9] Hyperglycemia     3/26/2023  1:25 PM Payton POLANCO Add [R60 0] Pedal edema       ED Disposition     ED Disposition   Discharge    Condition   Stable    Date/Time   Sun Mar 26, 2023  1:24 PM    Comment   Mario Braxton discharge to home/self care  Follow-up Information     Follow up With Specialties Details Why Contact Info Additional Information    Theresa Slater, DO Family Medicine Schedule an appointment as soon as possible for a visit  To review blood glucoses and for general reevaluation following fall  You may check with office staff    Removal of sutures and staples might be able to be performed in this office [address]    [facility] Emergency Department Emergency Medicine Go in 1 week  [address] [facility] [address] [facility], [address], [place], [place], [address]          Discharge Medication List as of 3/26/2023  1:29 PM      CONTINUE these medications which have NOT CHANGED    Details   acetaminophen (TYLENOL) 325 mg tablet Take 3 tablets (975 mg total) by mouth every 8 (eight) hours, Starting Wed 9/23/2020, No Print      amLODIPine (NORVASC) 10 mg tablet Take 10 mg by mouth daily, Historical Med      atorvastatin (LIPITOR) 20 mg tablet Take 40 mg by mouth daily , Historical Med      CLONIDINE HCL PO Take 0 3 mg by mouth daily, Historical Med      clopidogrel (PLAVIX) 75 mg tablet TAKE 1 TABLET BY MOUTH  DAILY, Historical Med      docusate sodium (COLACE) 100 mg capsule Take 1 capsule (100 mg total) by mouth 2 (two) times a day, Starting Wed 9/23/2020, No Print      fenofibrate (TRICOR) 145 mg tablet Take 145 mg by mouth daily, Historical Med      FLUoxetine (PROzac) 20 MG tablet Take 20 mg by mouth daily at bedtime  , Historical Med      Glucosamine-Chondroit-Vit C-Mn (GLUCOSAMINE CHONDR 1500 COMPLX PO) Take 1 tablet by mouth 2 (two) times a day, Historical Med      hydrochlorothiazide (HYDRODIURIL) 25 mg tablet Take 25 mg by mouth daily, Historical Med      ibuprofen (MOTRIN) 600 mg tablet Take 1 tablet (600 mg total) by mouth every 8 (eight) hours as needed for moderate pain (use sparingly), Starting Wed 9/23/2020, No Print      insulin glargine (LANTUS) 100 units/mL subcutaneous injection Inject 12 Units under the skin daily at bedtime, Starting Wed 9/23/2020, No Print      insulin lispro (HumaLOG) 100 units/mL injection Inject 6 Units under the skin 3 (three) times a day before meals, Starting Wed 9/23/2020, No Print      levETIRAcetam (KEPPRA) 250 mg tablet Take 500 mg by mouth 2 (two) times a day, Historical Med      levothyroxine 75 mcg tablet Take 75 mcg by mouth daily in the early morning  , Historical Med      lidocaine (LIDODERM) 5 % Apply 2 patches topically daily Remove & Discard patch within 12 hours or as directed by MD, Starting u 9/24/2020, No Print      lisinopril (ZESTRIL) 30 mg tablet Take 30 mg by mouth daily, Historical Med      melatonin 3 mg Take 1 tablet (3 mg total) by mouth daily at bedtime as needed (insomnia), Starting Wed 9/23/2020, No Print      menthol-methyl salicylate (BENGAY) 11-71 % cream Apply topically 3 (three) times a day, Starting Wed 9/23/2020, No Print      metFORMIN (GLUCOPHAGE) 1000 MG tablet Take 1,000 mg by mouth daily with breakfast  , Historical Med      methocarbamol (ROBAXIN) 750 mg tablet Take 1 tablet (750 mg total) by mouth every 6 (six) hours, Starting Wed 9/23/2020, No Print      Omega-3 1000 MG CAPS Take by mouth 2 (two) times a day, Historical Med      polyethylene glycol (MIRALAX) 17 g packet Take 17 g by mouth daily as needed (constipation), Starting Wed 9/23/2020, No Print             No discharge procedures on file      PDMP Review     None          ED Provider  Electronically Signed by           Parvez Phelps MD  03/27/23 4566

## (undated) DEVICE — 3M™ STERI-STRIP™ REINFORCED ADHESIVE SKIN CLOSURES, R1547, 1/2 IN X 4 IN (12 MM X 100 MM), 6 STRIPS/ENVELOPE: Brand: 3M™ STERI-STRIP™

## (undated) DEVICE — INTENDED FOR TISSUE SEPARATION, AND OTHER PROCEDURES THAT REQUIRE A SHARP SURGICAL BLADE TO PUNCTURE OR CUT.: Brand: BARD-PARKER ® CARBON RIB-BACK BLADES

## (undated) DEVICE — GLOVE SRG BIOGEL 8.5

## (undated) DEVICE — 3M™ STERI-DRAPE™ U-DRAPE 1015: Brand: STERI-DRAPE™

## (undated) DEVICE — 3000CC GUARDIAN II: Brand: GUARDIAN

## (undated) DEVICE — SAW BLADE OSCILLATING BRAZOL 167

## (undated) DEVICE — SURGICAL GOWN, XL SMARTSLEEVE: Brand: CONVERTORS

## (undated) DEVICE — GLOVE SRG BIOGEL 7

## (undated) DEVICE — MAYO STAND COVER: Brand: CONVERTORS

## (undated) DEVICE — SUT STRATAFIX SPIRAL PDS PLUS 1 CTX 18 IN SXPP1A400

## (undated) DEVICE — SUT MONOCRYL 3-0 PS-2 27 IN Y427H

## (undated) DEVICE — MASTISOL LIQ ADHESIVE 2/3ML

## (undated) DEVICE — TRAY FOLEY 16FR URIMETER SURESTEP

## (undated) DEVICE — IMPERVIOUS STOCKINETTE: Brand: DEROYAL

## (undated) DEVICE — DRAPE TOWEL: Brand: CONVERTORS

## (undated) DEVICE — CHEST ROLL FOAM POSITIONER: Brand: CARDINAL HEALTH

## (undated) DEVICE — COBAN 6 IN STERILE

## (undated) DEVICE — DRESSING MEPILEX AG BORDER 4 X 8 IN

## (undated) DEVICE — CHLORAPREP HI-LITE 26ML ORANGE

## (undated) DEVICE — NEEDLE 22 G X 1 1/2 SAFETY

## (undated) DEVICE — 3M™ DURAPORE™ SURGICAL TAPE 1538-3, 3 INCH X 10 YARD (7,5CM X 9,1M), 4 ROLLS/BOX: Brand: 3M™ DURAPORE™

## (undated) DEVICE — GAUZE SPONGES,16 PLY: Brand: CURITY

## (undated) DEVICE — 3M™ TEGADERM™ TRANSPARENT FILM DRESSING FRAME STYLE, 1627, 4 IN X 10 IN (10 CM X 25 CM), 20/CT 4CT/CASE: Brand: 3M™ TEGADERM™

## (undated) DEVICE — ADHESIVE SKN CLSR HISTOACRYL FLEX 0.5ML LF

## (undated) DEVICE — 3M™ IOBAN™ 2 ANTIMICROBIAL INCISE DRAPE 6648EZ: Brand: IOBAN™ 2

## (undated) DEVICE — SPECIMEN CONTAINER STERILE PEEL PACK

## (undated) DEVICE — ANTIBACTERIAL UNDYED BRAIDED (POLYGLACTIN 910), SYNTHETIC ABSORBABLE SUTURE: Brand: COATED VICRYL

## (undated) DEVICE — GLOVE INDICATOR PI UNDERGLOVE SZ 8 BLUE

## (undated) DEVICE — COBAN 4 IN STERILE

## (undated) DEVICE — INTENDED FOR TISSUE SEPARATION, AND OTHER PROCEDURES THAT REQUIRE A SHARP SURGICAL BLADE TO PUNCTURE OR CUT.: Brand: BARD-PARKER SAFETY BLADES SIZE 10, STERILE

## (undated) DEVICE — SUT VICRYL PLUS 1 CTX 36 IN VCP371H

## (undated) DEVICE — SCD SEQUENTIAL COMPRESSION COMFORT SLEEVE MEDIUM KNEE LENGTH: Brand: KENDALL SCD

## (undated) DEVICE — INSTRUMENT POUCH: Brand: CONVERTORS

## (undated) DEVICE — STOCKINETTE REGULAR

## (undated) DEVICE — THE SIMPULSE SOLO SYSTEM WITH ULTREX RETRACTABLE SPLASH SHIELD TIP: Brand: SIMPULSE SOLO

## (undated) DEVICE — STRL ALLENTOWN HIP SHOULDER PK: Brand: CARDINAL HEALTH

## (undated) DEVICE — CAPIT HIP MOP - ACTIS ONLY

## (undated) DEVICE — HOOD: Brand: FLYTE

## (undated) DEVICE — HEAVY DUTY TABLE COVER: Brand: CONVERTORS

## (undated) DEVICE — GLOVE INDICATOR PI UNDERGLOVE SZ 7 BLUE

## (undated) DEVICE — SUT FIBERWARE #5 1/2 CIRCLE CCS-1 38IN AR-7211

## (undated) DEVICE — SYRINGE 10ML LL

## (undated) DEVICE — ADHESIVE SKIN CLOSR DERMABOND PRINEO